# Patient Record
Sex: FEMALE | Race: WHITE | NOT HISPANIC OR LATINO | Employment: FULL TIME | URBAN - METROPOLITAN AREA
[De-identification: names, ages, dates, MRNs, and addresses within clinical notes are randomized per-mention and may not be internally consistent; named-entity substitution may affect disease eponyms.]

---

## 2023-05-04 ENCOUNTER — HOSPITAL ENCOUNTER (OUTPATIENT)
Dept: DATA CONVERSION | Facility: HOSPITAL | Age: 53
End: 2023-05-04
Attending: PAIN MEDICINE | Admitting: PAIN MEDICINE
Payer: COMMERCIAL

## 2023-05-04 DIAGNOSIS — M96.1 POSTLAMINECTOMY SYNDROME, NOT ELSEWHERE CLASSIFIED: ICD-10-CM

## 2023-05-15 DIAGNOSIS — F41.9 ANXIETY: ICD-10-CM

## 2023-05-15 DIAGNOSIS — I10 HYPERTENSION, UNSPECIFIED TYPE: Primary | ICD-10-CM

## 2023-05-15 RX ORDER — DULOXETIN HYDROCHLORIDE 60 MG/1
CAPSULE, DELAYED RELEASE ORAL
Qty: 180 CAPSULE | Refills: 3 | Status: SHIPPED | OUTPATIENT
Start: 2023-05-15

## 2023-05-15 RX ORDER — BUPROPION HYDROCHLORIDE 300 MG/1
TABLET ORAL
Qty: 90 TABLET | Refills: 3 | Status: SHIPPED | OUTPATIENT
Start: 2023-05-15

## 2023-05-15 RX ORDER — METOPROLOL TARTRATE 25 MG/1
TABLET, FILM COATED ORAL
Qty: 90 TABLET | Refills: 3 | Status: SHIPPED | OUTPATIENT
Start: 2023-05-15 | End: 2023-07-07 | Stop reason: SDUPTHER

## 2023-06-05 ENCOUNTER — HOSPITAL ENCOUNTER (OUTPATIENT)
Dept: DATA CONVERSION | Facility: HOSPITAL | Age: 53
End: 2023-06-05
Attending: PAIN MEDICINE | Admitting: PAIN MEDICINE
Payer: COMMERCIAL

## 2023-06-05 DIAGNOSIS — M70.60 TROCHANTERIC BURSITIS, UNSPECIFIED HIP: ICD-10-CM

## 2023-06-05 DIAGNOSIS — M96.1 POSTLAMINECTOMY SYNDROME, NOT ELSEWHERE CLASSIFIED: ICD-10-CM

## 2023-06-09 ENCOUNTER — OFFICE VISIT (OUTPATIENT)
Dept: PRIMARY CARE | Facility: CLINIC | Age: 53
End: 2023-06-09
Payer: COMMERCIAL

## 2023-06-09 VITALS
WEIGHT: 216 LBS | DIASTOLIC BLOOD PRESSURE: 100 MMHG | TEMPERATURE: 97.9 F | BODY MASS INDEX: 37.08 KG/M2 | SYSTOLIC BLOOD PRESSURE: 152 MMHG

## 2023-06-09 DIAGNOSIS — K21.9 GASTROESOPHAGEAL REFLUX DISEASE, UNSPECIFIED WHETHER ESOPHAGITIS PRESENT: Primary | ICD-10-CM

## 2023-06-09 DIAGNOSIS — E66.1 CLASS 2 DRUG-INDUCED OBESITY WITH SERIOUS COMORBIDITY AND BODY MASS INDEX (BMI) OF 37.0 TO 37.9 IN ADULT: ICD-10-CM

## 2023-06-09 PROCEDURE — 3008F BODY MASS INDEX DOCD: CPT | Performed by: FAMILY MEDICINE

## 2023-06-09 PROCEDURE — 99213 OFFICE O/P EST LOW 20 MIN: CPT | Performed by: FAMILY MEDICINE

## 2023-06-09 RX ORDER — BUTYROSPERMUM PARKII(SHEA BUTTER), SIMMONDSIA CHINENSIS (JOJOBA) SEED OIL, ALOE BARBADENSIS LEAF EXTRACT .01; 1; 3.5 G/100G; G/100G; G/100G
250 LIQUID TOPICAL 2 TIMES DAILY
COMMUNITY

## 2023-06-09 RX ORDER — TIZANIDINE 4 MG/1
4 TABLET ORAL EVERY 6 HOURS PRN
COMMUNITY
Start: 2020-05-22 | End: 2024-02-07

## 2023-06-09 RX ORDER — SODIUM FLUORIDE 6 MG/ML
1 PASTE, DENTIFRICE DENTAL DAILY
COMMUNITY
Start: 2023-06-07

## 2023-06-09 RX ORDER — HYDROMORPHONE HYDROCHLORIDE 4 MG/1
1 TABLET ORAL DAILY
COMMUNITY
Start: 2023-05-25 | End: 2023-10-06 | Stop reason: SDUPTHER

## 2023-06-09 RX ORDER — HYDROXYCHLOROQUINE SULFATE 200 MG/1
1.5 TABLET, FILM COATED ORAL DAILY
COMMUNITY
Start: 2011-10-25 | End: 2023-12-11

## 2023-06-09 RX ORDER — PHENOL 1.4 %
1 AEROSOL, SPRAY (ML) MUCOUS MEMBRANE DAILY
COMMUNITY
Start: 2022-01-18

## 2023-06-09 RX ORDER — MINERAL OIL
1 ENEMA (ML) RECTAL DAILY
COMMUNITY
Start: 2008-10-16

## 2023-06-09 RX ORDER — NALOXONE HYDROCHLORIDE 4 MG/.1ML
4 SPRAY NASAL AS NEEDED
COMMUNITY
Start: 2020-02-04

## 2023-06-09 RX ORDER — OMEPRAZOLE 20 MG/1
20 CAPSULE, DELAYED RELEASE ORAL DAILY
Qty: 30 CAPSULE | Refills: 5 | Status: SHIPPED | OUTPATIENT
Start: 2023-06-09 | End: 2023-07-07 | Stop reason: SDUPTHER

## 2023-06-09 ASSESSMENT — PATIENT HEALTH QUESTIONNAIRE - PHQ9
2. FEELING DOWN, DEPRESSED OR HOPELESS: NOT AT ALL
1. LITTLE INTEREST OR PLEASURE IN DOING THINGS: NOT AT ALL
SUM OF ALL RESPONSES TO PHQ9 QUESTIONS 1 AND 2: 0

## 2023-06-09 ASSESSMENT — ENCOUNTER SYMPTOMS: DEPRESSION: 0

## 2023-06-09 NOTE — PROGRESS NOTES
"Subjective   Patient ID: [id number]     Samina Celeste is a 52 y.o. female who presents for GERD (Referred by dentist./Would like to discuss weight loss medication.).  HPI  She was referred by her dentist for possible GERD.  She feels occasional heartburn but usually no discomfort.  No cough.  No hoarseness or laryngitis.      There is \"obvious\" signs of reflux, according to her dentist.    She would like to discuss weight loss medication.    Objective     BP (!) 152/100 (BP Location: Right arm, Patient Position: Sitting)   Temp 36.6 °C (97.9 °F)   Wt 98 kg (216 lb)   BMI 37.08 kg/m²      Physical Exam  Constitutional:       Appearance: Normal appearance.   Eyes:      Comments: Teeth discolored, mild brownish.  Per dentist signs of reflux.   Neurological:      Mental Status: She is alert.         Assessment/Plan   Problem List Items Addressed This Visit    None  Visit Diagnoses       Gastroesophageal reflux disease, unspecified whether esophagitis present    -  Primary    Relevant Medications    omeprazole (PriLOSEC) 20 mg DR capsule    Class 2 drug-induced obesity with serious comorbidity and body mass index (BMI) of 37.0 to 37.9 in adult        Relevant Medications    semaglutide 0.25 mg or 0.5 mg (2 mg/3 mL) pen injector        I prescribed omeprazole for suspected reflux.  I started you on Ozempic.  Return in one month for a recheck.  Your blood pressure is high.  We will recheck the blood pressure.  Try to decrease carbs and increase exercise.      Noe Velazquez, DO   "

## 2023-06-09 NOTE — PATIENT INSTRUCTIONS
I prescribed omeprazole for suspected reflux.  I started you on Ozempic.  Return in one month for a recheck.  Your blood pressure is high.  We will recheck the blood pressure.  Try to decrease carbs and increase exercise.

## 2023-06-16 ENCOUNTER — TELEPHONE (OUTPATIENT)
Dept: PRIMARY CARE | Facility: CLINIC | Age: 53
End: 2023-06-16
Payer: COMMERCIAL

## 2023-07-07 ENCOUNTER — OFFICE VISIT (OUTPATIENT)
Dept: PRIMARY CARE | Facility: CLINIC | Age: 53
End: 2023-07-07
Payer: COMMERCIAL

## 2023-07-07 VITALS
SYSTOLIC BLOOD PRESSURE: 132 MMHG | BODY MASS INDEX: 37.08 KG/M2 | DIASTOLIC BLOOD PRESSURE: 98 MMHG | TEMPERATURE: 97.5 F | WEIGHT: 216 LBS

## 2023-07-07 DIAGNOSIS — K21.9 GASTROESOPHAGEAL REFLUX DISEASE, UNSPECIFIED WHETHER ESOPHAGITIS PRESENT: ICD-10-CM

## 2023-07-07 DIAGNOSIS — E66.1 CLASS 2 DRUG-INDUCED OBESITY WITH SERIOUS COMORBIDITY AND BODY MASS INDEX (BMI) OF 37.0 TO 37.9 IN ADULT: ICD-10-CM

## 2023-07-07 DIAGNOSIS — I10 HYPERTENSION, UNSPECIFIED TYPE: Primary | ICD-10-CM

## 2023-07-07 PROCEDURE — 1036F TOBACCO NON-USER: CPT | Performed by: FAMILY MEDICINE

## 2023-07-07 PROCEDURE — 99214 OFFICE O/P EST MOD 30 MIN: CPT | Performed by: FAMILY MEDICINE

## 2023-07-07 PROCEDURE — 3008F BODY MASS INDEX DOCD: CPT | Performed by: FAMILY MEDICINE

## 2023-07-07 PROCEDURE — 3080F DIAST BP >= 90 MM HG: CPT | Performed by: FAMILY MEDICINE

## 2023-07-07 PROCEDURE — 3075F SYST BP GE 130 - 139MM HG: CPT | Performed by: FAMILY MEDICINE

## 2023-07-07 RX ORDER — METOPROLOL TARTRATE 25 MG/1
25 TABLET, FILM COATED ORAL 2 TIMES DAILY
Qty: 180 TABLET | Refills: 1 | Status: SHIPPED | OUTPATIENT
Start: 2023-07-07 | End: 2023-08-07 | Stop reason: SDUPTHER

## 2023-07-07 RX ORDER — SEMAGLUTIDE 0.25 MG/.5ML
0.25 INJECTION, SOLUTION SUBCUTANEOUS
Qty: 2 ML | Refills: 1 | Status: SHIPPED | OUTPATIENT
Start: 2023-07-07 | End: 2023-12-19 | Stop reason: WASHOUT

## 2023-07-07 RX ORDER — OMEPRAZOLE 20 MG/1
20 CAPSULE, DELAYED RELEASE ORAL DAILY
Qty: 90 CAPSULE | Refills: 1 | Status: SHIPPED | OUTPATIENT
Start: 2023-07-07 | End: 2023-12-26

## 2023-07-07 NOTE — PROGRESS NOTES
Subjective   Patient ID: [id number]     Samina Celeste is a 52 y.o. female who presents for Follow-up.  HPI  She is here for a one month recheck.  She was started on omeprazole for reflux that was seen by her dentist. She does not feel anything new.  She has not seen her dentist since.  Never felt reflux to beginning.  She has had less heartburn since starting omeprazole.  She has not noticed any side effects to omeprazole.       Her blood pressure was high last month and remains high now at 132/98.      She was started on ozempic for weight loss last month.  Her weight is unchanged at 216 pounds.  She was not able to get the Ozempic. It was denied by insurance.  She is asking about Weygovy.      Objective     BP (!) 132/98 (BP Location: Right arm, Patient Position: Sitting)   Temp 36.4 °C (97.5 °F)   Wt 98 kg (216 lb)   BMI 37.08 kg/m²      Physical Exam  Constitutional:       Appearance: Normal appearance.   Cardiovascular:      Rate and Rhythm: Normal rate and regular rhythm.   Pulmonary:      Effort: Pulmonary effort is normal.      Breath sounds: Normal breath sounds.   Abdominal:      General: Abdomen is flat.      Palpations: Abdomen is soft.   Neurological:      Mental Status: She is alert.         Assessment/Plan   Problem List Items Addressed This Visit    None  Visit Diagnoses       Hypertension, unspecified type    -  Primary    Relevant Medications    metoprolol tartrate (Lopressor) 25 mg tablet    Gastroesophageal reflux disease, unspecified whether esophagitis present        Relevant Medications    omeprazole (PriLOSEC) 20 mg DR capsule    Class 2 drug-induced obesity with serious comorbidity and body mass index (BMI) of 37.0 to 37.9 in adult        Relevant Medications    semaglutide, weight loss, (Wegovy) 0.25 mg/0.5 mL pen injector        Your blood pressure remains high.  I will increase your metoprolol.  Try to lose weight. Avoid sodium in the diet by basing your diet mostly on fresh foods,  especially fruits, vegetables and lean proteins.  Drink only water.  Your omeprazole was refilled.  Follow up with your dentist as directed.  I ordered Weygovy to try to help you with weight loss.  Return in one or two months for a recheck.     Noe Velazquez, DO

## 2023-07-07 NOTE — PATIENT INSTRUCTIONS
Your blood pressure remains high.  I will increase your metoprolol.  Try to lose weight. Avoid sodium in the diet by basing your diet mostly on fresh foods, especially fruits, vegetables and lean proteins.  Drink only water.  Your omeprazole was refilled.  Follow up with your dentist as directed.  I ordered Ascencion to try to help you with weight loss.  Return in one or two months for a recheck.

## 2023-07-21 ENCOUNTER — HOSPITAL ENCOUNTER (OUTPATIENT)
Dept: DATA CONVERSION | Facility: HOSPITAL | Age: 53
End: 2023-07-21
Attending: PAIN MEDICINE | Admitting: PAIN MEDICINE
Payer: COMMERCIAL

## 2023-07-21 DIAGNOSIS — Z45.1 ENCOUNTER FOR ADJUSTMENT AND MANAGEMENT OF INFUSION PUMP: ICD-10-CM

## 2023-07-21 DIAGNOSIS — M96.1 POSTLAMINECTOMY SYNDROME, NOT ELSEWHERE CLASSIFIED: ICD-10-CM

## 2023-07-28 ENCOUNTER — HOSPITAL ENCOUNTER (OUTPATIENT)
Dept: DATA CONVERSION | Facility: HOSPITAL | Age: 53
End: 2023-07-28
Attending: PAIN MEDICINE | Admitting: PAIN MEDICINE
Payer: COMMERCIAL

## 2023-07-28 DIAGNOSIS — M70.62 TROCHANTERIC BURSITIS, LEFT HIP: ICD-10-CM

## 2023-07-28 DIAGNOSIS — M70.61 TROCHANTERIC BURSITIS, RIGHT HIP: ICD-10-CM

## 2023-08-07 ENCOUNTER — OFFICE VISIT (OUTPATIENT)
Dept: PRIMARY CARE | Facility: CLINIC | Age: 53
End: 2023-08-07
Payer: COMMERCIAL

## 2023-08-07 VITALS
WEIGHT: 217 LBS | TEMPERATURE: 97.9 F | SYSTOLIC BLOOD PRESSURE: 158 MMHG | BODY MASS INDEX: 37.25 KG/M2 | DIASTOLIC BLOOD PRESSURE: 94 MMHG

## 2023-08-07 DIAGNOSIS — E66.1 CLASS 2 DRUG-INDUCED OBESITY WITH SERIOUS COMORBIDITY AND BODY MASS INDEX (BMI) OF 37.0 TO 37.9 IN ADULT: ICD-10-CM

## 2023-08-07 DIAGNOSIS — I10 HYPERTENSION, UNSPECIFIED TYPE: Primary | ICD-10-CM

## 2023-08-07 DIAGNOSIS — K21.9 GASTROESOPHAGEAL REFLUX DISEASE, UNSPECIFIED WHETHER ESOPHAGITIS PRESENT: ICD-10-CM

## 2023-08-07 PROCEDURE — 3080F DIAST BP >= 90 MM HG: CPT | Performed by: FAMILY MEDICINE

## 2023-08-07 PROCEDURE — 3077F SYST BP >= 140 MM HG: CPT | Performed by: FAMILY MEDICINE

## 2023-08-07 PROCEDURE — 99213 OFFICE O/P EST LOW 20 MIN: CPT | Performed by: FAMILY MEDICINE

## 2023-08-07 PROCEDURE — 1036F TOBACCO NON-USER: CPT | Performed by: FAMILY MEDICINE

## 2023-08-07 PROCEDURE — 3008F BODY MASS INDEX DOCD: CPT | Performed by: FAMILY MEDICINE

## 2023-08-07 RX ORDER — TIRZEPATIDE 2.5 MG/.5ML
2.5 INJECTION, SOLUTION SUBCUTANEOUS
Qty: 2 ML | Refills: 0 | Status: SHIPPED | OUTPATIENT
Start: 2023-08-07 | End: 2023-12-19 | Stop reason: WASHOUT

## 2023-08-07 RX ORDER — METOPROLOL TARTRATE 50 MG/1
50 TABLET ORAL 2 TIMES DAILY
Qty: 60 TABLET | Refills: 0 | Status: SHIPPED | OUTPATIENT
Start: 2023-08-07 | End: 2023-11-05

## 2023-08-07 NOTE — PATIENT INSTRUCTIONS
I will prescribe Mounjaro to try to help you with weight loss.  Restrict portions and avoid carbs. Increase exercise.      I will order a higher dose of metoprolol at 50 mg twice a day.      Continue the omeprazole for heartburn and suspected reflux.    Return in one month for a recheck and sooner if there are any problems.

## 2023-08-07 NOTE — PROGRESS NOTES
Subjective   Patient ID: [id number]     Samina Celeste is a 52 y.o. female who presents for Follow-up (One Month).  HPI  She is here for a one month recheck.      She was started on omeprazole last month due to her dentist's concerns for GERD based upon the appearance of her teeth.      She was prescribed Wegovy last month.  Her weight is up one pound.  BMI is 37.25.      Her blood pressure is 158/94 and has been elevated over the last three visits.      She denies any reflux.  She was never aware of the reflux but states her heartburn has gone away.  Denies any side effects to omeprazole.      She was not able to start Wegovy.  It was denied by insurance.    Her blood pressure remains high.  She is on metoprolol 25 mg bid.            Objective     BP (!) 158/94 (BP Location: Right arm, Patient Position: Sitting)   Temp 36.6 °C (97.9 °F)   Wt 98.4 kg (217 lb)   BMI 37.25 kg/m²      Physical Exam  Constitutional:       Appearance: Normal appearance.   Cardiovascular:      Rate and Rhythm: Normal rate and regular rhythm.      Heart sounds: Normal heart sounds.   Pulmonary:      Effort: Pulmonary effort is normal.      Breath sounds: Normal breath sounds.   Neurological:      Mental Status: She is alert.         Assessment/Plan   Problem List Items Addressed This Visit    None  Visit Diagnoses       Hypertension, unspecified type    -  Primary    Relevant Medications    metoprolol tartrate (Lopressor) 50 mg tablet    Gastroesophageal reflux disease, unspecified whether esophagitis present        Class 2 drug-induced obesity with serious comorbidity and body mass index (BMI) of 37.0 to 37.9 in adult        Relevant Medications    tirzepatide (Mounjaro) 2.5 mg/0.5 mL pen injector        I will prescribe Mounjaro to try to help you with weight loss.  Restrict portions and avoid carbs. Increase exercise.      I will order a higher dose of metoprolol at 50 mg twice a day.      Continue the omeprazole for heartburn and  suspected reflux.    Return in one month for a recheck and sooner if there are any problems.    Noe Velazquez, DO

## 2023-09-11 ENCOUNTER — APPOINTMENT (OUTPATIENT)
Dept: PRIMARY CARE | Facility: CLINIC | Age: 53
End: 2023-09-11
Payer: COMMERCIAL

## 2023-09-14 PROBLEM — Z79.899 DRUG THERAPY: Status: ACTIVE | Noted: 2023-09-14

## 2023-09-14 PROBLEM — R91.1 LUNG NODULE: Status: ACTIVE | Noted: 2023-09-14

## 2023-09-14 PROBLEM — F45.42 PAIN DISORDER ASSOCIATED WITH PSYCHOLOGICAL AND PHYSICAL FACTORS: Status: ACTIVE | Noted: 2023-09-14

## 2023-09-14 PROBLEM — H35.40 PERIPHERAL RETINAL DEGENERATION OF BOTH EYES: Status: ACTIVE | Noted: 2023-02-28

## 2023-09-14 PROBLEM — S69.92XA INJURY OF LEFT THUMB: Status: ACTIVE | Noted: 2023-09-14

## 2023-09-14 PROBLEM — N93.9 ABNORMAL VAGINAL BLEEDING: Status: ACTIVE | Noted: 2023-09-14

## 2023-09-14 PROBLEM — M79.673 PAIN OF FOOT: Status: ACTIVE | Noted: 2023-09-14

## 2023-09-14 PROBLEM — F45.41 PSYCHOGENIC PAIN: Status: ACTIVE | Noted: 2023-09-14

## 2023-09-14 PROBLEM — S96.912A STRAIN OF LEFT FOOT: Status: ACTIVE | Noted: 2023-09-14

## 2023-09-14 PROBLEM — E66.1 DRUG-INDUCED OBESITY: Status: ACTIVE | Noted: 2023-09-14

## 2023-09-14 PROBLEM — M48.00 SPINAL STENOSIS: Status: ACTIVE | Noted: 2023-09-14

## 2023-09-14 PROBLEM — H25.13 AGE-RELATED NUCLEAR CATARACT OF BOTH EYES: Status: ACTIVE | Noted: 2023-02-28

## 2023-09-14 PROBLEM — M25.562 LEFT KNEE PAIN: Status: ACTIVE | Noted: 2023-09-14

## 2023-09-14 PROBLEM — M54.9 CHRONIC BACK PAIN: Status: ACTIVE | Noted: 2023-09-14

## 2023-09-14 PROBLEM — M54.50 LOW BACK PAIN, UNSPECIFIED: Status: ACTIVE | Noted: 2022-10-27

## 2023-09-14 PROBLEM — Z86.69 HISTORY OF MIGRAINE: Status: ACTIVE | Noted: 2023-09-14

## 2023-09-14 PROBLEM — H25.013 CORTICAL AGE-RELATED CATARACT OF BOTH EYES: Status: ACTIVE | Noted: 2023-02-28

## 2023-09-14 PROBLEM — E87.1 HYPONATREMIA: Status: ACTIVE | Noted: 2022-08-01

## 2023-09-14 PROBLEM — G47.00 INSOMNIA: Status: ACTIVE | Noted: 2023-09-14

## 2023-09-14 PROBLEM — M75.42 IMPINGEMENT SYNDROME OF LEFT SHOULDER: Status: ACTIVE | Noted: 2023-09-14

## 2023-09-14 PROBLEM — S61.219A LACERATION OF FINGER: Status: ACTIVE | Noted: 2023-09-14

## 2023-09-14 PROBLEM — R80.9 PROTEINURIA: Status: ACTIVE | Noted: 2023-09-14

## 2023-09-14 PROBLEM — T85.898A OBSTRUCTED PRESSURE-EQUALIZATION (PE) TUBE: Status: ACTIVE | Noted: 2019-07-23

## 2023-09-14 PROBLEM — K21.9 GASTROESOPHAGEAL REFLUX DISEASE: Status: ACTIVE | Noted: 2023-09-14

## 2023-09-14 PROBLEM — S29.011A PECTORALIS MUSCLE STRAIN: Status: ACTIVE | Noted: 2023-09-14

## 2023-09-14 PROBLEM — Z80.3 FAMILY HISTORY OF BREAST CANCER: Status: ACTIVE | Noted: 2022-09-07

## 2023-09-14 PROBLEM — M71.9 DISORDER OF BURSAE OF SHOULDER REGION: Status: ACTIVE | Noted: 2020-10-13

## 2023-09-14 PROBLEM — S86.912A STRAIN OF LEFT KNEE: Status: ACTIVE | Noted: 2023-09-14

## 2023-09-14 PROBLEM — E66.9 OBESITY: Status: ACTIVE | Noted: 2023-09-14

## 2023-09-14 PROBLEM — M76.52 PATELLAR TENDINITIS OF LEFT KNEE: Status: ACTIVE | Noted: 2023-09-14

## 2023-09-14 PROBLEM — H52.203 ASTIGMATISM OF BOTH EYES: Status: ACTIVE | Noted: 2023-02-28

## 2023-09-14 PROBLEM — D49.2 SKIN NEOPLASM: Status: ACTIVE | Noted: 2023-09-14

## 2023-09-14 PROBLEM — R06.02 SHORTNESS OF BREATH: Status: ACTIVE | Noted: 2023-09-14

## 2023-09-14 PROBLEM — S61.213A LACERATION OF LEFT MIDDLE FINGER WITHOUT FOREIGN BODY WITHOUT DAMAGE TO NAIL: Status: ACTIVE | Noted: 2023-09-14

## 2023-09-14 PROBLEM — R29.898 WEAKNESS OF RIGHT HAND: Status: ACTIVE | Noted: 2023-09-14

## 2023-09-14 PROBLEM — Z87.39 H/O DEGENERATIVE DISC DISEASE: Status: ACTIVE | Noted: 2023-09-14

## 2023-09-14 PROBLEM — S90.30XA FOOT CONTUSION: Status: ACTIVE | Noted: 2023-09-14

## 2023-09-14 PROBLEM — I10 HYPERTENSION: Status: ACTIVE | Noted: 2023-09-14

## 2023-09-14 PROBLEM — R92.8 ABNORMAL MAMMOGRAM: Status: ACTIVE | Noted: 2022-09-07

## 2023-09-14 PROBLEM — M70.62 TROCHANTERIC BURSITIS OF LEFT HIP: Status: ACTIVE | Noted: 2023-07-28

## 2023-09-14 PROBLEM — R74.8 ELEVATED LIVER ENZYMES: Status: ACTIVE | Noted: 2023-09-14

## 2023-09-14 PROBLEM — S62.525A CLOSED NONDISPLACED FRACTURE OF DISTAL PHALANX OF LEFT THUMB: Status: ACTIVE | Noted: 2023-09-14

## 2023-09-14 PROBLEM — R25.1 TREMOR: Status: ACTIVE | Noted: 2023-09-14

## 2023-09-14 PROBLEM — Z96.22 RETAINED MYRINGOTOMY TUBE IN LEFT EAR: Status: ACTIVE | Noted: 2023-09-14

## 2023-09-14 PROBLEM — J04.0 LARYNGITIS: Status: ACTIVE | Noted: 2023-09-14

## 2023-09-14 PROBLEM — M25.512 LEFT SHOULDER PAIN: Status: ACTIVE | Noted: 2023-09-14

## 2023-09-14 PROBLEM — R53.83 MALAISE AND FATIGUE: Status: ACTIVE | Noted: 2023-09-14

## 2023-09-14 PROBLEM — G43.909 MIGRAINE HEADACHE: Status: ACTIVE | Noted: 2023-09-14

## 2023-09-14 PROBLEM — R53.83 FATIGUE: Status: ACTIVE | Noted: 2023-09-14

## 2023-09-14 PROBLEM — M35.9 UNDIFFERENTIATED CONNECTIVE TISSUE DISEASE (MULTI): Status: ACTIVE | Noted: 2022-08-01

## 2023-09-14 PROBLEM — N95.1 MENOPAUSAL SYMPTOMS: Status: ACTIVE | Noted: 2023-09-14

## 2023-09-14 PROBLEM — M48.061 LUMBAR STENOSIS: Status: ACTIVE | Noted: 2022-10-27

## 2023-09-14 PROBLEM — R91.1 NODULE OF LOWER LOBE OF RIGHT LUNG: Status: ACTIVE | Noted: 2023-09-14

## 2023-09-14 PROBLEM — D72.829 LEUKOCYTOSIS: Status: ACTIVE | Noted: 2023-09-14

## 2023-09-14 PROBLEM — R20.0 NUMBNESS OF UPPER EXTREMITY: Status: ACTIVE | Noted: 2023-09-14

## 2023-09-14 PROBLEM — G89.29 CHRONIC BACK PAIN: Status: ACTIVE | Noted: 2023-09-14

## 2023-09-14 PROBLEM — M79.605 PAIN OF LEFT LOWER EXTREMITY: Status: ACTIVE | Noted: 2023-09-14

## 2023-09-14 PROBLEM — M62.838 MUSCLE SPASM: Status: ACTIVE | Noted: 2023-09-14

## 2023-09-14 PROBLEM — M75.22 BICEPS TENDONITIS ON LEFT: Status: ACTIVE | Noted: 2023-09-14

## 2023-09-14 PROBLEM — H52.4 PRESBYOPIA: Status: ACTIVE | Noted: 2023-02-28

## 2023-09-14 PROBLEM — H52.13 MYOPIA, BILATERAL: Status: ACTIVE | Noted: 2023-02-28

## 2023-09-14 PROBLEM — R53.81 MALAISE AND FATIGUE: Status: ACTIVE | Noted: 2023-09-14

## 2023-09-14 PROBLEM — M50.90 CERVICAL DISC DISORDER: Status: ACTIVE | Noted: 2020-10-13

## 2023-09-14 PROBLEM — R91.8 LUNG MASS: Status: ACTIVE | Noted: 2023-09-14

## 2023-09-14 PROBLEM — M79.605 LEFT LEG PAIN: Status: ACTIVE | Noted: 2023-09-14

## 2023-09-14 PROBLEM — M47.816 LUMBAR SPONDYLOSIS: Status: ACTIVE | Noted: 2023-09-14

## 2023-09-14 PROBLEM — S69.90XA HAND INJURY: Status: ACTIVE | Noted: 2023-09-14

## 2023-09-14 PROBLEM — D49.2 GENERALIZED SKIN TUMORS: Status: ACTIVE | Noted: 2023-09-14

## 2023-09-14 PROBLEM — M70.61 TROCHANTERIC BURSITIS OF RIGHT HIP: Status: ACTIVE | Noted: 2023-07-28

## 2023-09-14 PROBLEM — R10.2 PELVIC PAIN IN FEMALE: Status: ACTIVE | Noted: 2023-09-14

## 2023-09-14 PROBLEM — F41.9 ANXIETY: Status: ACTIVE | Noted: 2023-09-14

## 2023-09-14 PROBLEM — M75.20 BICEPS TENDINITIS: Status: ACTIVE | Noted: 2023-09-14

## 2023-09-14 PROBLEM — D64.9 ANEMIA: Status: ACTIVE | Noted: 2023-09-14

## 2023-09-14 PROBLEM — M96.1 LUMBAR POST-LAMINECTOMY SYNDROME: Status: ACTIVE | Noted: 2023-09-14

## 2023-09-14 PROBLEM — H00.031 CELLULITIS OF RIGHT UPPER EYELID: Status: ACTIVE | Noted: 2023-09-14

## 2023-09-14 PROBLEM — H43.812 POSTERIOR VITREOUS DETACHMENT OF LEFT EYE: Status: ACTIVE | Noted: 2023-02-28

## 2023-09-14 PROBLEM — F32.A DEPRESSION: Status: ACTIVE | Noted: 2023-09-14

## 2023-09-27 LAB
ANION GAP IN SER/PLAS: 14 MMOL/L (ref 10–20)
BASOPHILS (10*3/UL) IN BLOOD BY AUTOMATED COUNT: 0.04 X10E9/L (ref 0–0.1)
BASOPHILS/100 LEUKOCYTES IN BLOOD BY AUTOMATED COUNT: 0.5 % (ref 0–2)
CALCIUM (MG/DL) IN SER/PLAS: 9.6 MG/DL (ref 8.6–10.3)
CARBON DIOXIDE, TOTAL (MMOL/L) IN SER/PLAS: 27 MMOL/L (ref 21–32)
CHLORIDE (MMOL/L) IN SER/PLAS: 92 MMOL/L (ref 98–107)
CHOLESTEROL (MG/DL) IN SER/PLAS: 141 MG/DL (ref 0–199)
CHOLESTEROL IN HDL (MG/DL) IN SER/PLAS: 58.1 MG/DL
CHOLESTEROL/HDL RATIO: 2.4
COMPLEMENT C3 (MG/DL) IN SER/PLAS: 173 MG/DL (ref 87–200)
COMPLEMENT C4 (MG/DL) IN SER/PLAS: 33 MG/DL (ref 10–50)
CREATININE (MG/DL) IN SER/PLAS: 0.94 MG/DL (ref 0.5–1.05)
CREATININE (MG/DL) IN URINE: 95 MG/DL (ref 20–320)
EOSINOPHILS (10*3/UL) IN BLOOD BY AUTOMATED COUNT: 0.11 X10E9/L (ref 0–0.7)
EOSINOPHILS/100 LEUKOCYTES IN BLOOD BY AUTOMATED COUNT: 1.3 % (ref 0–6)
ERYTHROCYTE DISTRIBUTION WIDTH (RATIO) BY AUTOMATED COUNT: 12.2 % (ref 11.5–14.5)
ERYTHROCYTE MEAN CORPUSCULAR HEMOGLOBIN CONCENTRATION (G/DL) BY AUTOMATED: 32.2 G/DL (ref 32–36)
ERYTHROCYTE MEAN CORPUSCULAR VOLUME (FL) BY AUTOMATED COUNT: 94 FL (ref 80–100)
ERYTHROCYTES (10*6/UL) IN BLOOD BY AUTOMATED COUNT: 3.95 X10E12/L (ref 4–5.2)
GFR FEMALE: 72 ML/MIN/1.73M2
GLUCOSE (MG/DL) IN SER/PLAS: 77 MG/DL (ref 74–99)
HEMATOCRIT (%) IN BLOOD BY AUTOMATED COUNT: 37.3 % (ref 36–46)
HEMOGLOBIN (G/DL) IN BLOOD: 12 G/DL (ref 12–16)
IMMATURE GRANULOCYTES/100 LEUKOCYTES IN BLOOD BY AUTOMATED COUNT: 0.2 % (ref 0–0.9)
LDL: 62 MG/DL (ref 0–99)
LEUKOCYTES (10*3/UL) IN BLOOD BY AUTOMATED COUNT: 8.7 X10E9/L (ref 4.4–11.3)
LYMPHOCYTES (10*3/UL) IN BLOOD BY AUTOMATED COUNT: 3.31 X10E9/L (ref 1.2–4.8)
LYMPHOCYTES/100 LEUKOCYTES IN BLOOD BY AUTOMATED COUNT: 38 % (ref 13–44)
MONOCYTES (10*3/UL) IN BLOOD BY AUTOMATED COUNT: 0.67 X10E9/L (ref 0.1–1)
MONOCYTES/100 LEUKOCYTES IN BLOOD BY AUTOMATED COUNT: 7.7 % (ref 2–10)
NEUTROPHILS (10*3/UL) IN BLOOD BY AUTOMATED COUNT: 4.56 X10E9/L (ref 1.2–7.7)
NEUTROPHILS/100 LEUKOCYTES IN BLOOD BY AUTOMATED COUNT: 52.3 % (ref 40–80)
PLATELETS (10*3/UL) IN BLOOD AUTOMATED COUNT: 385 X10E9/L (ref 150–450)
POTASSIUM (MMOL/L) IN SER/PLAS: 3.9 MMOL/L (ref 3.5–5.3)
PROTEIN (MG/DL) IN URINE: 6 MG/DL (ref 5–24)
PROTEIN/CREATININE (MG/MG) IN URINE: 0.06 MG/MG CREAT (ref 0–0.17)
SEDIMENTATION RATE, ERYTHROCYTE: 11 MM/H (ref 0–30)
SODIUM (MMOL/L) IN SER/PLAS: 129 MMOL/L (ref 136–145)
TRIGLYCERIDE (MG/DL) IN SER/PLAS: 107 MG/DL (ref 0–149)
UREA NITROGEN (MG/DL) IN SER/PLAS: 9 MG/DL (ref 6–23)
VLDL: 21 MG/DL (ref 0–40)

## 2023-09-28 LAB
ANTI-CENTROMERE: <0.2 AI
ANTI-CHROMATIN: <0.2 AI
ANTI-DNA (DS): <1 IU/ML
ANTI-JO-1 IGG: <0.2 AI
ANTI-NUCLEAR ANTIBODY (ANA): NEGATIVE
ANTI-RIBOSOMAL P: <0.2 AI
ANTI-RNP: <0.2 AI
ANTI-SCL-70: <0.2 AI
ANTI-SM/RNP: <0.2 AI
ANTI-SM: <0.2 AI
ANTI-SSA: <0.2 AI
ANTI-SSB: <0.2 AI

## 2023-10-01 DIAGNOSIS — E87.1 HYPONATREMIA: Primary | ICD-10-CM

## 2023-10-01 NOTE — PROGRESS NOTES
Sodium is low at 129- etiology unclear. Recommend repeat labs in 2 weeks: BMP, morning cortisol.  Dr. Alfonso

## 2023-10-01 NOTE — PROGRESS NOTES
Labs show sodium 129- etiology unclear. Other labs are unremarkable. I recommend repeat labs in 2 weeks: BMP, morning cortisol.  Dr. Alfonso

## 2023-10-02 NOTE — OP NOTE
PROCEDURE DETAILS    Preoperative Diagnosis:  Lumbar postlaminectomy syndrome, M96.1    Postoperative Diagnosis:  Lumbar postlaminectomy syndrome, M96.1    Surgeon: Jaye Xiao  Resident/Fellow/Other Assistant: None of these were associated with this case    Procedure:  1. Pump Refill M96.1 06504    Anesthesia: No anesthesiologist associated with this case  Estimated Blood Loss: 0  Findings: None         Operative Report:           Operation  Intrathecal pump refill.    Surgical Indications  The patient is here today to receive an intrathecal pump refill to assist with the pain.    Operative Report  The patient was taken to the procedure area, was placed into a supine positioning. The pump was interrogated and showed a refill volume of 4 mL.  The pump then was emptied and a new solution of morphine and clonidine preservative-free at a dose of 10 and 0.5 mg/mL, a total volume of 20 mL was injected into the pump, and the pump was reprogrammed to deliver a dose of 1.15 mg of morphine with PTM  0.3 mg maximum 4 per day.  The alarm date was set for 7/24/2023. The patient recovered for sufficient amount of time and then was discharged home in stable condition. Patient was advised to [facility]up with [facility] to [facility]ill his pump accordingly.                        Attestation:   Note Completion:  Attending Attestation I performed the procedure without a resident         Electronic Signatures:  Jaye Xiao)  (Signed 04-May-2023 13:26)   Authored: Post-Operative Note, Chart Review, Note Completion      Last Updated: 04-May-2023 13:26 by Jaye Xiao)

## 2023-10-02 NOTE — OP NOTE
PROCEDURE DETAILS    Preoperative Diagnosis:  Greater trochanteric bursitis, M70.60    Postoperative Diagnosis:  Greater trochanteric bursitis, M70.60    Surgeon: Jaye Xiao  Resident/Fellow/Other Assistant: None of these were associated with this case    Procedure:  1. bilateral greater trochanteric bursa M70.60 20610    Anesthesia: No anesthesiologist associated with this case  Estimated Blood Loss: 0  Findings: None         Operative Report:   Patient taken to the procedure area both hip area were prepped and draped sterilely in the normal fashion with the aid of a 25-gauge needle 40 mg of Depo-Medrol  diluted into 3 cc of 0.25% bupivacaine were injected per site of the greater trochanteric bursa bilaterally with negative aspiration of heme patient tolerated the procedure well recovered for sufficient amount of time and then was discharged home in stable  condition patient was advised to follow-up with the pain clinic on as-needed basis  Thank you for allowing me to participate in the care of this patient    Please note this report has been  produced using speech recognition software and may contain errors related to that system including grammar , punctuation  and spelling as well as words and phrases that may be inappropriate. If there are questions or concerns, please feel free to contact me to clarify.                        Attestation:   Note Completion:  Attending Attestation I performed the procedure without a resident         Electronic Signatures:  Jaye Xiao)  (Signed 05-Jun-2023 13:39)   Authored: Post-Operative Note, Chart Review, Note Completion      Last Updated: 05-Jun-2023 13:39 by Jaye Xiao)

## 2023-10-02 NOTE — OP NOTE
PROCEDURE DETAILS      Postoperative Diagnosis:  greater trochanteric bursitis   Surgeon: Jaye Xiao MD   Resident/Fellow/Other Assistant: None     Procedure:  bilateral greater trochanteric bursa injection  Estimated Blood Loss: 0  Findings: None         Operative Report:   Patient taken to the procedure area of both the greater she enteric bursa area were prepped and draped sterilely in the normal fashion with the aid of 25-gauge needle 40 mg of Depo-Medrol  diluted into 3 cc of 0.5% marcaine were injected around the right and then left greater trochanteric bursa with negative aspiration of heme patient tolerated the procedure well recovered for sufficient amount of time and then was discharged home in stable  condition patient was advised to follow-up with the pain clinic on as-needed basis                          Electronic Signatures:  Jaye Xiao)  (Signed 28-Jul-2023 15:23)   Authored: Post-Operative Note, Chart Review, Note Completion      Last Updated: 28-Jul-2023 15:23 by Jaye Xiao)

## 2023-10-05 DIAGNOSIS — M96.1 POSTLAMINECTOMY SYNDROME, LUMBAR: Primary | ICD-10-CM

## 2023-10-06 ENCOUNTER — TELEPHONE (OUTPATIENT)
Dept: PAIN MEDICINE | Facility: CLINIC | Age: 53
End: 2023-10-06

## 2023-10-06 ENCOUNTER — PROCEDURE VISIT (OUTPATIENT)
Dept: PAIN MEDICINE | Facility: CLINIC | Age: 53
End: 2023-10-06
Payer: COMMERCIAL

## 2023-10-06 VITALS
DIASTOLIC BLOOD PRESSURE: 71 MMHG | HEART RATE: 68 BPM | TEMPERATURE: 96.9 F | SYSTOLIC BLOOD PRESSURE: 109 MMHG | RESPIRATION RATE: 20 BRPM

## 2023-10-06 DIAGNOSIS — M96.1 LUMBAR POST-LAMINECTOMY SYNDROME: Primary | ICD-10-CM

## 2023-10-06 PROCEDURE — 62370 ANL SP INF PMP W/MDREPRG&FIL: CPT | Performed by: PAIN MEDICINE

## 2023-10-06 RX ORDER — HYDROMORPHONE HYDROCHLORIDE 4 MG/1
4 TABLET ORAL DAILY
Qty: 30 TABLET | Refills: 0 | Status: SHIPPED | OUTPATIENT
Start: 2023-11-06 | End: 2023-10-06

## 2023-10-06 RX ORDER — HYDROMORPHONE HYDROCHLORIDE 4 MG/1
4 TABLET ORAL DAILY
Qty: 30 TABLET | Refills: 0 | Status: SHIPPED | OUTPATIENT
Start: 2023-10-06 | End: 2023-10-06

## 2023-10-06 RX ORDER — HYDROMORPHONE HYDROCHLORIDE 4 MG/1
4 TABLET ORAL DAILY
Qty: 30 TABLET | Refills: 0 | Status: SHIPPED | OUTPATIENT
Start: 2023-10-06 | End: 2023-11-05

## 2023-10-06 RX ORDER — HYDROMORPHONE HYDROCHLORIDE 4 MG/1
4 TABLET ORAL DAILY
Qty: 30 TABLET | Refills: 0 | Status: SHIPPED | OUTPATIENT
Start: 2023-12-05 | End: 2023-12-19 | Stop reason: WASHOUT

## 2023-10-06 RX ORDER — HYDROMORPHONE HYDROCHLORIDE 4 MG/1
4 TABLET ORAL DAILY
Qty: 30 TABLET | Refills: 0 | Status: SHIPPED | OUTPATIENT
Start: 2023-12-06 | End: 2023-10-06

## 2023-10-06 RX ORDER — HYDROMORPHONE HYDROCHLORIDE 4 MG/1
4 TABLET ORAL DAILY
Qty: 30 TABLET | Refills: 0 | OUTPATIENT
Start: 2023-11-05 | End: 2023-12-12

## 2023-10-06 ASSESSMENT — PAIN - FUNCTIONAL ASSESSMENT: PAIN_FUNCTIONAL_ASSESSMENT: 0-10

## 2023-10-06 ASSESSMENT — PAIN SCALES - GENERAL: PAINLEVEL_OUTOF10: 7

## 2023-10-06 NOTE — H&P
Operation  Intrathecal pump refill.    Surgical Indications  The patient is here today to receive an intrathecal pump refill to assist with the pain.    Operative Report  The patient was taken to the procedure area, was placed into a supine positioning. The pump was interrogated and showed a refill volume of 4 mL. The pump then was emptied and a new solution of morphine and clonidine preservative-free at a dose of 10 and 0.5 mg/mL, a total volume of 20 mL was injected into the pump, and the pump was reprogrammed to deliver a dose of 1.151 mg of morphine with PTM 0.3 mg for injection per day.  The alarm date was set for 12/26/2023. The patient recovered for sufficient amount of time and then was discharged home in stable condition. Patient was advised to followup with the [facility] to refill his pump accordingly.

## 2023-10-17 ENCOUNTER — TELEMEDICINE (OUTPATIENT)
Dept: PRIMARY CARE | Facility: CLINIC | Age: 53
End: 2023-10-17
Payer: COMMERCIAL

## 2023-10-17 DIAGNOSIS — U07.1 COVID: Primary | ICD-10-CM

## 2023-10-17 PROCEDURE — 99213 OFFICE O/P EST LOW 20 MIN: CPT | Performed by: FAMILY MEDICINE

## 2023-10-17 NOTE — PROGRESS NOTES
Subjective   Patient ID: [id number]   Virtual or Telephone Consent    An interactive audio and video telecommunication system which permits real time communications between the patient (at the originating site) and provider (at the distant site) was utilized to provide this telehealth service.   Verbal consent was requested and obtained from Samina Celeste on this date, 10/17/23 for a telehealth visit.     Samina Celeste is a 52 y.o. female who presents for a sore throat.  She first became sick.  She did a covid test about an hour ago and it came back positive.  She has a sore throat.  She has sinus congestion.  She had some dizziness and achiness.  No coughing or SOB.      HPI    Objective     There were no vitals taken for this visit.     Physical Exam  Constitutional:       General: She is not in acute distress.     Appearance: Normal appearance. She is not toxic-appearing.   Pulmonary:      Effort: Pulmonary effort is normal. No respiratory distress.   Neurological:      Mental Status: She is alert and oriented to person, place, and time.         Assessment/Plan   Problem List Items Addressed This Visit    None  Visit Diagnoses       COVID    -  Primary    Relevant Medications    nirmatrelvir-ritonavir (PAXLOVID) 300 mg (150 mg x 2)-100 mg tablet therapy pack        I called in an antiviral medication to help with covid.  I recommend that you quarantine for five days starting with the day you first became sick, and until you are feeling significantly better and fever free for at least 24 hours.    I recommend that you go to the ER if you have significant shortness of breath.      Noe Velazquez, DO

## 2023-10-17 NOTE — PATIENT INSTRUCTIONS
I called in an antiviral medication to help with covid.  I recommend that you quarantine for five days starting with the day you first became sick, and until you are feeling significantly better and fever free for at least 24 hours.    I recommend that you go to the ER if you have significant shortness of breath.

## 2023-10-18 ENCOUNTER — APPOINTMENT (OUTPATIENT)
Dept: PAIN MEDICINE | Facility: CLINIC | Age: 53
End: 2023-10-18
Payer: COMMERCIAL

## 2023-11-03 ENCOUNTER — LAB (OUTPATIENT)
Dept: LAB | Facility: LAB | Age: 53
End: 2023-11-03
Payer: COMMERCIAL

## 2023-11-03 DIAGNOSIS — E87.1 HYPONATREMIA: ICD-10-CM

## 2023-11-03 LAB
ANION GAP SERPL CALC-SCNC: 11 MMOL/L (ref 10–20)
BUN SERPL-MCNC: 7 MG/DL (ref 6–23)
CALCIUM SERPL-MCNC: 9 MG/DL (ref 8.6–10.3)
CHLORIDE SERPL-SCNC: 95 MMOL/L (ref 98–107)
CO2 SERPL-SCNC: 27 MMOL/L (ref 21–32)
CREAT SERPL-MCNC: 0.89 MG/DL (ref 0.5–1.05)
GFR SERPL CREATININE-BSD FRML MDRD: 77 ML/MIN/1.73M*2
GLUCOSE SERPL-MCNC: 111 MG/DL (ref 74–99)
POTASSIUM SERPL-SCNC: 4.1 MMOL/L (ref 3.5–5.3)
SODIUM SERPL-SCNC: 129 MMOL/L (ref 136–145)

## 2023-11-03 PROCEDURE — 36415 COLL VENOUS BLD VENIPUNCTURE: CPT

## 2023-11-03 PROCEDURE — 80048 BASIC METABOLIC PNL TOTAL CA: CPT

## 2023-11-04 DIAGNOSIS — I10 HYPERTENSION, UNSPECIFIED TYPE: ICD-10-CM

## 2023-11-05 RX ORDER — METOPROLOL TARTRATE 50 MG/1
50 TABLET ORAL 2 TIMES DAILY
Qty: 180 TABLET | Refills: 0 | Status: SHIPPED | OUTPATIENT
Start: 2023-11-05 | End: 2024-01-15

## 2023-11-07 ENCOUNTER — LAB (OUTPATIENT)
Dept: LAB | Facility: LAB | Age: 53
End: 2023-11-07
Payer: COMMERCIAL

## 2023-11-07 DIAGNOSIS — E87.1 HYPONATREMIA: ICD-10-CM

## 2023-11-07 LAB — CORTIS AM PEAK SERPL-MSCNC: 6.4 UG/DL (ref 5–20)

## 2023-11-07 PROCEDURE — 36415 COLL VENOUS BLD VENIPUNCTURE: CPT

## 2023-11-07 PROCEDURE — 82533 TOTAL CORTISOL: CPT

## 2023-11-15 DIAGNOSIS — R79.89 LOW SERUM CORTISOL LEVEL: ICD-10-CM

## 2023-11-15 DIAGNOSIS — E87.1 HYPONATREMIA: Primary | ICD-10-CM

## 2023-11-15 RX ORDER — COSYNTROPIN 0.25 MG/ML
0.25 INJECTION, POWDER, FOR SOLUTION INTRAMUSCULAR; INTRAVENOUS ONCE
Qty: 1 ML | Refills: 0 | Status: SHIPPED | OUTPATIENT
Start: 2023-11-15 | End: 2023-11-21 | Stop reason: SDUPTHER

## 2023-11-17 ENCOUNTER — TELEPHONE (OUTPATIENT)
Dept: PRIMARY CARE | Facility: CLINIC | Age: 53
End: 2023-11-17
Payer: COMMERCIAL

## 2023-11-17 NOTE — TELEPHONE ENCOUNTER
Dr. Alfonso left message for patient yesterday regarding an injection she is to get from pharmacy patient has questions please call      [phone]

## 2023-11-21 DIAGNOSIS — R79.89 LOW SERUM CORTISOL LEVEL: ICD-10-CM

## 2023-11-21 DIAGNOSIS — E87.1 HYPONATREMIA: ICD-10-CM

## 2023-11-21 RX ORDER — COSYNTROPIN 0.25 MG/ML
0.25 INJECTION, POWDER, FOR SOLUTION INTRAMUSCULAR; INTRAVENOUS ONCE
Qty: 1 ML | Refills: 0 | Status: SHIPPED | OUTPATIENT
Start: 2023-11-21 | End: 2023-11-22 | Stop reason: SDUPTHER

## 2023-11-21 NOTE — TELEPHONE ENCOUNTER
I spoke to patient in the office today.  She needs a prescription for Cortrosyn sent to go to [facility] in [place].  They can order the medication.  She will then make the nursing visit for the Cortrosyn stimulation test.  Dr. Alfonso

## 2023-11-22 ENCOUNTER — PHARMACY VISIT (OUTPATIENT)
Dept: PHARMACY | Facility: CLINIC | Age: 53
End: 2023-11-22
Payer: COMMERCIAL

## 2023-11-22 ENCOUNTER — TELEPHONE (OUTPATIENT)
Dept: PRIMARY CARE | Facility: CLINIC | Age: 53
End: 2023-11-22
Payer: COMMERCIAL

## 2023-11-22 DIAGNOSIS — R79.89 LOW SERUM CORTISOL LEVEL: ICD-10-CM

## 2023-11-22 DIAGNOSIS — E87.1 HYPONATREMIA: ICD-10-CM

## 2023-11-22 PROCEDURE — RXMED WILLOW AMBULATORY MEDICATION CHARGE

## 2023-11-22 RX ORDER — COSYNTROPIN 0.25 MG/ML
0.25 INJECTION, POWDER, FOR SOLUTION INTRAMUSCULAR; INTRAVENOUS ONCE
Qty: 1 ML | Refills: 0 | Status: SHIPPED | OUTPATIENT
Start: 2023-11-22 | End: 2023-11-28

## 2023-11-22 NOTE — TELEPHONE ENCOUNTER
Patient called for a prescription that was sent out to her local pharmacy yesterday, cortrosyn 0.25 mg injection. Patient states that the pharmacy does not carries that medication, I called the pharmacy here in this building and they said that they need to receive the prescription order, so they can order it today and it will arrive by Friday. Please advise!

## 2023-11-28 ENCOUNTER — CLINICAL SUPPORT (OUTPATIENT)
Dept: PRIMARY CARE | Facility: CLINIC | Age: 53
End: 2023-11-28
Payer: COMMERCIAL

## 2023-11-28 ENCOUNTER — LAB (OUTPATIENT)
Dept: LAB | Facility: LAB | Age: 53
End: 2023-11-28
Payer: COMMERCIAL

## 2023-11-28 DIAGNOSIS — E87.1 HYPONATREMIA: Primary | ICD-10-CM

## 2023-11-28 DIAGNOSIS — E87.1 HYPONATREMIA: ICD-10-CM

## 2023-11-28 DIAGNOSIS — N95.1 MENOPAUSAL SYMPTOMS: Primary | ICD-10-CM

## 2023-11-28 LAB
CORTIS SERPL-MCNC: 20.9 UG/DL (ref 2.5–20)
CORTIS SERPL-MCNC: 28.8 UG/DL (ref 2.5–20)
CORTIS SERPL-MCNC: 4.4 UG/DL (ref 2.5–20)

## 2023-11-28 PROCEDURE — 36415 COLL VENOUS BLD VENIPUNCTURE: CPT

## 2023-11-28 PROCEDURE — 96372 THER/PROPH/DIAG INJ SC/IM: CPT | Performed by: INTERNAL MEDICINE

## 2023-11-28 PROCEDURE — 82533 TOTAL CORTISOL: CPT

## 2023-11-28 RX ORDER — COSYNTROPIN 0.25 MG/ML
0.25 INJECTION, POWDER, FOR SOLUTION INTRAMUSCULAR; INTRAVENOUS ONCE
Status: COMPLETED | OUTPATIENT
Start: 2023-11-28 | End: 2023-11-28

## 2023-11-28 RX ADMIN — COSYNTROPIN 0.25 MG: 0.25 INJECTION, POWDER, FOR SOLUTION INTRAMUSCULAR; INTRAVENOUS at 09:44

## 2023-11-28 NOTE — PROGRESS NOTES
She came in for a cortisol injection, she got a base blood level before injection and she will wait 30 minutes and than an hour following the injection for the 2 consecutive blood draws. Patient picked up the medication and brought it to office

## 2023-11-29 ENCOUNTER — APPOINTMENT (OUTPATIENT)
Dept: PAIN MEDICINE | Facility: CLINIC | Age: 53
End: 2023-11-29
Payer: COMMERCIAL

## 2023-12-04 ENCOUNTER — OFFICE VISIT (OUTPATIENT)
Dept: PAIN MEDICINE | Facility: CLINIC | Age: 53
End: 2023-12-04
Payer: COMMERCIAL

## 2023-12-04 VITALS — RESPIRATION RATE: 20 BRPM | SYSTOLIC BLOOD PRESSURE: 117 MMHG | DIASTOLIC BLOOD PRESSURE: 64 MMHG | HEART RATE: 67 BPM

## 2023-12-04 DIAGNOSIS — M25.552 CHRONIC HIP PAIN, BILATERAL: Primary | ICD-10-CM

## 2023-12-04 DIAGNOSIS — M54.50 CHRONIC BILATERAL LOW BACK PAIN, UNSPECIFIED WHETHER SCIATICA PRESENT: ICD-10-CM

## 2023-12-04 DIAGNOSIS — G89.29 CHRONIC HIP PAIN, BILATERAL: Primary | ICD-10-CM

## 2023-12-04 DIAGNOSIS — M25.551 CHRONIC HIP PAIN, BILATERAL: Primary | ICD-10-CM

## 2023-12-04 DIAGNOSIS — G89.29 CHRONIC BILATERAL LOW BACK PAIN, UNSPECIFIED WHETHER SCIATICA PRESENT: ICD-10-CM

## 2023-12-04 PROCEDURE — 1036F TOBACCO NON-USER: CPT | Performed by: NURSE PRACTITIONER

## 2023-12-04 PROCEDURE — 99213 OFFICE O/P EST LOW 20 MIN: CPT | Performed by: NURSE PRACTITIONER

## 2023-12-04 PROCEDURE — 3078F DIAST BP <80 MM HG: CPT | Performed by: NURSE PRACTITIONER

## 2023-12-04 PROCEDURE — 3074F SYST BP LT 130 MM HG: CPT | Performed by: NURSE PRACTITIONER

## 2023-12-04 PROCEDURE — 3008F BODY MASS INDEX DOCD: CPT | Performed by: NURSE PRACTITIONER

## 2023-12-04 PROCEDURE — 99213 OFFICE O/P EST LOW 20 MIN: CPT | Mod: ZK | Performed by: NURSE PRACTITIONER

## 2023-12-04 RX ORDER — HYDROMORPHONE HYDROCHLORIDE 4 MG/1
4 TABLET ORAL DAILY
Qty: 30 TABLET | Refills: 0 | Status: SHIPPED | OUTPATIENT
Start: 2024-01-03 | End: 2023-12-19 | Stop reason: WASHOUT

## 2023-12-04 RX ORDER — HYDROMORPHONE HYDROCHLORIDE 4 MG/1
4 TABLET ORAL DAILY
Qty: 30 TABLET | Refills: 0 | Status: SHIPPED | OUTPATIENT
Start: 2024-02-02 | End: 2024-01-03 | Stop reason: SDUPTHER

## 2023-12-04 RX ORDER — HYDROMORPHONE HYDROCHLORIDE 4 MG/1
4 TABLET ORAL DAILY
Qty: 30 TABLET | Refills: 0 | Status: SHIPPED | OUTPATIENT
Start: 2023-12-04 | End: 2023-12-19 | Stop reason: WASHOUT

## 2023-12-04 ASSESSMENT — PAIN DESCRIPTION - DESCRIPTORS: DESCRIPTORS: ACHING;SHARP;SHOOTING;SQUEEZING

## 2023-12-04 ASSESSMENT — PAIN SCALES - GENERAL
PAINLEVEL: 8
PAINLEVEL_OUTOF10: 8

## 2023-12-04 ASSESSMENT — PAIN - FUNCTIONAL ASSESSMENT: PAIN_FUNCTIONAL_ASSESSMENT: 0-10

## 2023-12-04 NOTE — PROGRESS NOTES
Subjective   Samina Celeste is a 53 y.o. female who presents for evaluation of her back pain.  The pain is located in the lumbar area and radiates to left leg.  The pt states her pain is 8/10 when she moves. At rest her pain is at a 4/10.  Past Medical History:   Diagnosis Date    Other conditions influencing health status     Visit For: Routine Eye Exam    Person consulting for explanation of examination or test findings     Visit for review of DEXA scan    Personal history of diseases of the skin and subcutaneous tissue     History of urticaria due to cold    Personal history of other diseases of the nervous system and sense organs     History of migraine headaches    Personal history of other medical treatment     History of mammogram    Presence of other specified functional implants     Spinal cord stimulator status     Past Surgical History:   Procedure Laterality Date    LUMBAR FUSION  04/08/2014    Lumbar Vertebral Fusion    OTHER SURGICAL HISTORY  08/16/2017    Neurological Surgery Laminectomy    OTHER SURGICAL HISTORY  06/11/2019    Back surgery    TONSILLECTOMY  03/26/2014    Tonsillectomy       I have reviewed the nurses notes and am aware of family/social history.     Review of Systems    Objective   Physical Exam    ASSESSMENT:     PLAN:   Discussed treatment plan with patient.   Call or return to clinic prn if these symptoms worsen or fail to improve as anticipated.     Maribel Mistry RN

## 2023-12-04 NOTE — PROGRESS NOTES
Subjective   Patient ID: Samina Celeste is a 53 y.o. female who presents for Med Refill and Back Pain (And medication refill).  HPI  53 years old, female she is here today for a follow up/refill of medication. She is known in this clinic because of chronic back pain.  She has a spinal cord stimulator, pain pump for control of pain and is maintained on Hydromorphone to help in controlling her pain.  She is able to Function with activities of daily living independently.      Review of Systems   Review of systems x 10 is negative.   No recent injury or falls reported.   No recent change in medical condition reported.   No recent weakness reported.   Still able to control bowel and bladder function.   Denies fever, cough, shortness of breath recently.   No interval change with medication/health issues reported.    Objective   Physical Exam  Awake,alert, no acute distress, appropriate.  Spine is of normal curvature.  Full ROM on all 4 extremities, sensation and motor intact, no vascular compromise.  No pedal edema, normal gait.  Skin warm, dry, intact, turgor is normal.  Denies any numbness, tingling.  /64   Pulse 67   Resp 20     Assessment/Plan     I have personally reviewed the O[facility] report for this patient. I have considered the risk of abuse, dependence, addiction and diversion.  I believe that it is clinically appropriate for this patient  to be prescribed this medication based on documented diagnosis.  Continue Hydromorphone as prescribed.  Discussed about result of xray left shoulder  Which showed moderate to severe left acromioclavicular osteoarthrosis.  Discussed about possible steroid injection or nerve block  Schedule for bilateral bursa injection for control of hip pains.  Follow up in 3 months time or as needed basis  Continue doing safe guidelines for preventing spread of Corona virus.  Explained plan to this patient, and patient verbalized understanding and agreement with the plan. If there is  questions or concerns, please feel free to contact me to clarify.    Chronic back pain associated with postlaminectomy syndrome.

## 2023-12-11 DIAGNOSIS — M35.9 UNDIFFERENTIATED CONNECTIVE TISSUE DISEASE (MULTI): Primary | ICD-10-CM

## 2023-12-11 RX ORDER — HYDROXYCHLOROQUINE SULFATE 200 MG/1
TABLET, FILM COATED ORAL DAILY
Qty: 135 TABLET | Refills: 1 | Status: SHIPPED | OUTPATIENT
Start: 2023-12-11

## 2023-12-12 ENCOUNTER — HOSPITAL ENCOUNTER (OUTPATIENT)
Dept: PAIN MEDICINE | Facility: CLINIC | Age: 53
Discharge: HOME | End: 2023-12-12
Payer: COMMERCIAL

## 2023-12-12 VITALS
HEART RATE: 79 BPM | TEMPERATURE: 97.1 F | DIASTOLIC BLOOD PRESSURE: 58 MMHG | RESPIRATION RATE: 18 BRPM | SYSTOLIC BLOOD PRESSURE: 105 MMHG | OXYGEN SATURATION: 95 %

## 2023-12-12 DIAGNOSIS — G89.29 CHRONIC HIP PAIN, BILATERAL: ICD-10-CM

## 2023-12-12 DIAGNOSIS — M25.551 CHRONIC HIP PAIN, BILATERAL: ICD-10-CM

## 2023-12-12 DIAGNOSIS — M25.552 CHRONIC HIP PAIN, BILATERAL: ICD-10-CM

## 2023-12-12 PROCEDURE — 2500000005 HC RX 250 GENERAL PHARMACY W/O HCPCS

## 2023-12-12 PROCEDURE — 2500000004 HC RX 250 GENERAL PHARMACY W/ HCPCS (ALT 636 FOR OP/ED): Performed by: PAIN MEDICINE

## 2023-12-12 PROCEDURE — 7100000010 HC PHASE TWO TIME - EACH INCREMENTAL 1 MINUTE

## 2023-12-12 PROCEDURE — 7100000009 HC PHASE TWO TIME - INITIAL BASE CHARGE

## 2023-12-12 PROCEDURE — 96372 THER/PROPH/DIAG INJ SC/IM: CPT | Performed by: PAIN MEDICINE

## 2023-12-12 PROCEDURE — 2500000005 HC RX 250 GENERAL PHARMACY W/O HCPCS: Performed by: PAIN MEDICINE

## 2023-12-12 RX ORDER — METHYLPREDNISOLONE ACETATE 40 MG/ML
80 INJECTION, SUSPENSION INTRA-ARTICULAR; INTRALESIONAL; INTRAMUSCULAR; SOFT TISSUE ONCE
Status: COMPLETED | OUTPATIENT
Start: 2023-12-12 | End: 2023-12-12

## 2023-12-12 RX ORDER — METHYLPREDNISOLONE ACETATE 40 MG/ML
80 INJECTION, SUSPENSION INTRA-ARTICULAR; INTRALESIONAL; INTRAMUSCULAR; SOFT TISSUE ONCE
Status: DISCONTINUED | OUTPATIENT
Start: 2023-12-12 | End: 2023-12-13 | Stop reason: HOSPADM

## 2023-12-12 RX ORDER — BUPIVACAINE HYDROCHLORIDE 2.5 MG/ML
5 INJECTION, SOLUTION EPIDURAL; INFILTRATION; INTRACAUDAL ONCE
Status: DISCONTINUED | OUTPATIENT
Start: 2023-12-12 | End: 2023-12-13 | Stop reason: HOSPADM

## 2023-12-12 RX ORDER — BUPIVACAINE HYDROCHLORIDE 5 MG/ML
10 INJECTION, SOLUTION PERINEURAL ONCE
Status: COMPLETED | OUTPATIENT
Start: 2023-12-12 | End: 2023-12-12

## 2023-12-12 RX ADMIN — METHYLPREDNISOLONE ACETATE 80 MG: 40 INJECTION, SUSPENSION INTRALESIONAL; INTRAMUSCULAR; INTRASYNOVIAL; SOFT TISSUE at 13:19

## 2023-12-12 RX ADMIN — BUPIVACAINE HYDROCHLORIDE 50 MG: 5 INJECTION, SOLUTION PERINEURAL at 13:19

## 2023-12-12 ASSESSMENT — PAIN SCALES - GENERAL: PAINLEVEL_OUTOF10: 9

## 2023-12-12 ASSESSMENT — PAIN - FUNCTIONAL ASSESSMENT: PAIN_FUNCTIONAL_ASSESSMENT: 0-10

## 2023-12-12 NOTE — OP NOTE
Patient taken to the procedure area placed into a lateral position the right hip was prepped and draped sterilely with the aid of 25-gauge needle 40 mg of Depo-Medrol diluted into 3 cc of 0.5% bupivacaine were injected around the greater trochenteric bursa  with negative aspiration of heme then same was done for the left side patient tolerated the procedure well recovered for sufficient amount of time and then was discharged home in stable condition patient was advised to follow-up with the pain clinic on as-needed basis.      The above clinical summary has been dictated with voice recognition software. It has not been proofread for grammatical errors, typographical mistakes, or other semantic inconsistencies.    Thank you for visiting our office today. It was our pleasure to take part in your healthcare.     Please do not hesitate to contact the pain clinic after your visit with any questions or concerns at  M-F 8-4 pm       Jaye Xiao M.D.  Medical Director , Division of Pain Medicine [facility]   of Anesthesiology and Pain Medicine  [facility] [place] [address]     Office: [phone]  Fax: [phone]       
Yes

## 2023-12-12 NOTE — DISCHARGE INSTRUCTIONS
Corticosteroid Joint Injection    Why is this procedure done?  Your doctor may give you a shot of a special medicine called a corticosteroid into your joint area, to help ease swelling and pain in the joint. Your joints are places where 2 or more bones meet. You may have an injury or swelling that limits your movement and causes you pain.  What will the results be?  The treatment may:  Lower pain  Reduce swelling in the muscles, nerves, and other tissues  Improve movement  What happens before the procedure?  Your doctor will ask you about your health history. Talk to the doctor about:  All the drugs you are taking. Be sure to include all prescription and over-the-counter (OTC) drugs, vitamins, and herbal supplements. Tell the doctor about any drug allergy. Bring a list of drugs you take with you.  If you have high blood sugar or diabetes. Your drugs may need to be changed.  If you have had another steroid injection within the past 6 weeks or if you have had more than 3 injections into the same area within the past 12 months.  Any bleeding problems. Be sure to tell your doctor if you are taking any drugs that may cause bleeding. Some of these are warfarin, rivaroxaban, apixaban, ticagrelor, clopidogrel, ketorolac, ibuprofen, naproxen, or aspirin. Certain vitamins and herbs, such as garlic and fish oil, may also add to the risk for bleeding. You may need to stop these drugs as well. Talk to your doctor about them.  If you have high blood pressure.  If you have had any allergic reactions to steroids before.  What happens during the procedure?  Your doctor may do an x-ray of your joint or use x-ray during the procedure to check where to give the drug. The doctor may also use ultrasound to check. The doctor may also use a colored dye or a contrast dye to check where to inject the drug.  The doctor will clean the skin over your joint. This helps prevent infection.  You may be given a drug to help you relax. Your doctor  may numb the site of the injection.  The drug will be injected into a space in or near the joint.  The needle will be taken out and a bandage will be placed over the injection site.  What happens after the procedure?  Staff will check on you to make sure you are doing well. They will tell you when you can go home.  What care is needed at home?  Relax on the day of the injection.  Avoid heavy activity with the joint that was injected for a few days.  Apply ice to the injection site. Place an ice pack or a bag of frozen peas wrapped in a towel over the painful part. Never put ice right on the skin. Do not leave the ice on more than 10 to 15 minutes at a time.  It may take few days before you will see the effects of the injection.  What follow-up care is needed?  Your doctor may ask you to make visits to the office to check on your progress. Be sure to keep these visits. You may also need to see a physical therapist (PT). The PT will teach you exercises to help you get back your strength and motion. Ask your doctor when you can exercise.  What problems could happen?  Bleeding or bruising  Soreness at the injection site  Infection  May elevate blood pressure  Cartilage damage  Whitening or lightening of the skin around the injection site  Nerve injury  Tendon rupture  Thinning of the skin and bone around where the injection was given  If you are diabetic, your blood sugars may increase for several days  Last Reviewed Date  2020-04-20

## 2023-12-13 ENCOUNTER — PROCEDURE VISIT (OUTPATIENT)
Dept: PAIN MEDICINE | Facility: CLINIC | Age: 53
End: 2023-12-13
Payer: COMMERCIAL

## 2023-12-13 VITALS
TEMPERATURE: 98.2 F | DIASTOLIC BLOOD PRESSURE: 65 MMHG | SYSTOLIC BLOOD PRESSURE: 137 MMHG | RESPIRATION RATE: 16 BRPM | HEART RATE: 71 BPM

## 2023-12-13 DIAGNOSIS — M96.1 POSTLAMINECTOMY SYNDROME, LUMBAR: ICD-10-CM

## 2023-12-13 PROCEDURE — 62370 ANL SP INF PMP W/MDREPRG&FIL: CPT | Performed by: PAIN MEDICINE

## 2023-12-13 ASSESSMENT — COLUMBIA-SUICIDE SEVERITY RATING SCALE - C-SSRS
2. HAVE YOU ACTUALLY HAD ANY THOUGHTS OF KILLING YOURSELF?: NO
6. HAVE YOU EVER DONE ANYTHING, STARTED TO DO ANYTHING, OR PREPARED TO DO ANYTHING TO END YOUR LIFE?: NO
1. IN THE PAST MONTH, HAVE YOU WISHED YOU WERE DEAD OR WISHED YOU COULD GO TO SLEEP AND NOT WAKE UP?: NO

## 2023-12-13 ASSESSMENT — PAIN SCALES - GENERAL: PAINLEVEL: 7

## 2023-12-13 NOTE — PROGRESS NOTES
Patient ID: Samina Celeste is a 53 y.o. female.    Procedures            Operation  Intrathecal pump refill.    Surgical Indications  The patient is here today to receive an intrathecal pump refill to assist with the pain.    Operative Report  The patient was taken to the procedure area, was placed into a supine positioning. The pump was interrogated and showed a refill volume of 7 mL. The pump then was emptied and a new solution of morphine and clonidine preservative-free at a dose of 10 then 0.5 mg/mL, a total volume of 20 mL was injected into the pump, and the pump was reprogrammed to deliver a dose of 1.151 mg of morphine with PTM 0.3 mg maximum 4 injection per day.  The alarm date was set for 3/3/2024. The patient recovered for sufficient amount of time and then was discharged home in stable condition. Patient was advised to followup with the [facility] to refill his pump accordingly.

## 2023-12-19 ENCOUNTER — OFFICE VISIT (OUTPATIENT)
Dept: RHEUMATOLOGY | Facility: CLINIC | Age: 53
End: 2023-12-19
Payer: COMMERCIAL

## 2023-12-19 VITALS
OXYGEN SATURATION: 93 % | WEIGHT: 201.8 LBS | BODY MASS INDEX: 34.45 KG/M2 | SYSTOLIC BLOOD PRESSURE: 110 MMHG | DIASTOLIC BLOOD PRESSURE: 64 MMHG | TEMPERATURE: 96.9 F | HEIGHT: 64 IN | HEART RATE: 48 BPM

## 2023-12-19 DIAGNOSIS — I10 PRIMARY HYPERTENSION: ICD-10-CM

## 2023-12-19 DIAGNOSIS — M47.816 LUMBAR SPONDYLOSIS: ICD-10-CM

## 2023-12-19 DIAGNOSIS — E87.1 HYPONATREMIA: ICD-10-CM

## 2023-12-19 DIAGNOSIS — M35.9 UNDIFFERENTIATED CONNECTIVE TISSUE DISEASE (MULTI): Primary | ICD-10-CM

## 2023-12-19 PROBLEM — B34.9 NONSPECIFIC SYNDROME SUGGESTIVE OF VIRAL ILLNESS: Status: ACTIVE | Noted: 2023-12-19

## 2023-12-19 PROBLEM — M70.70 BURSITIS OF HIP: Status: ACTIVE | Noted: 2023-12-19

## 2023-12-19 PROBLEM — H10.9 CONJUNCTIVITIS: Status: ACTIVE | Noted: 2023-09-14

## 2023-12-19 PROCEDURE — 93000 ELECTROCARDIOGRAM COMPLETE: CPT | Performed by: INTERNAL MEDICINE

## 2023-12-19 PROCEDURE — 3078F DIAST BP <80 MM HG: CPT | Performed by: INTERNAL MEDICINE

## 2023-12-19 PROCEDURE — 3074F SYST BP LT 130 MM HG: CPT | Performed by: INTERNAL MEDICINE

## 2023-12-19 PROCEDURE — 3008F BODY MASS INDEX DOCD: CPT | Performed by: INTERNAL MEDICINE

## 2023-12-19 PROCEDURE — 1036F TOBACCO NON-USER: CPT | Performed by: INTERNAL MEDICINE

## 2023-12-19 PROCEDURE — 90471 IMMUNIZATION ADMIN: CPT | Performed by: INTERNAL MEDICINE

## 2023-12-19 PROCEDURE — 90686 IIV4 VACC NO PRSV 0.5 ML IM: CPT | Performed by: INTERNAL MEDICINE

## 2023-12-19 PROCEDURE — 99214 OFFICE O/P EST MOD 30 MIN: CPT | Performed by: INTERNAL MEDICINE

## 2023-12-19 RX ORDER — OXYCODONE AND ACETAMINOPHEN 10; 325 MG/1; MG/1
1 TABLET ORAL DAILY
COMMUNITY
Start: 2023-04-22

## 2023-12-19 ASSESSMENT — PATIENT HEALTH QUESTIONNAIRE - PHQ9
1. LITTLE INTEREST OR PLEASURE IN DOING THINGS: NOT AT ALL
1. LITTLE INTEREST OR PLEASURE IN DOING THINGS: NOT AT ALL
2. FEELING DOWN, DEPRESSED OR HOPELESS: NOT AT ALL
SUM OF ALL RESPONSES TO PHQ9 QUESTIONS 1 AND 2: 0
2. FEELING DOWN, DEPRESSED OR HOPELESS: NOT AT ALL
SUM OF ALL RESPONSES TO PHQ9 QUESTIONS 1 AND 2: 0

## 2023-12-19 ASSESSMENT — ENCOUNTER SYMPTOMS
OCCASIONAL FEELINGS OF UNSTEADINESS: 0
LOSS OF SENSATION IN FEET: 0
DEPRESSION: 0

## 2023-12-19 NOTE — PATIENT INSTRUCTIONS
EKG today is normal.  Flu shot was given.  Check labs April 2024: CBC with differential, BMP, ESR, spot urine protein to creatinine ratio.  You have a tender point near your left shoulder blade, which I think can be injected by Dr. Xiao.   Follow-up in 4 months.

## 2023-12-19 NOTE — PROGRESS NOTES
Subjective   Patient ID: Samina Celeste is a 53 y.o. female who presents for Follow-up.    HPI 52 yo F here for f/u re: undifferentiated connective tissue disease (diagnosed 2004 with symptoms of severe fatigue after sun exposure, arthralgias of wrist and ankles, FAMILIA 1:80 with negative FAMILIA panel).   FAMILIA has been negative since 2017.     HCQ was reduced to 200 mg daily 6/21, without any worsening of symptoms.     She continues to deny peripheral joint pain. She denies rashes, dry eyes , dry mouth, or hair loss.    She does c/o pain on top of left shoulder that she ha shad for 3 months.  X-ray of left shoulder done September 2023 shows  Advanced osteoarthritis of the left acromioclavicular joint.  Glenohumeral joint itself looks unremarkable.  She has no exacerbation of pain with lifting her arm overhead, or flexion of her arm across her chest.  \  She had COVID October 2023.  She was treated with Paxlovid.  She has not fully recovered.    She also the history of lumbar spondylosis, for which she has been working with Dr. Dee and Dr. Xiao.  She had L4-L5 laminectomy and discectomy with pedicle screw fixation several years ago for spondylolisthesis with herniated disc. She had an L3- L4 lumbar laminectomy 1/17 for recurrent back and leg pain. She also has spinal cord stimulator. She also takes Percocet 7.5-325 mg once daily as needed. , as well as duloxetine 60 mg 2x daily.   She also takes Dilaudid 4 mg daily .  She last worked 1/17. She is now on Social Security disability.     She also had a spinal cord stimulator placed October 29, 2020 by Dr. Xiao, for postlaminectomy syndrome.  She notes that this has helped.     Last eye exam was 2/23(Dr. Mascorro).      She received the Pfizer COVID-19 vaccine April 20, 2021 and May 11, 2021.     Labs 12/22: CBC normal except hemoglobin 11.0 hematocrit 35.4, BMP normal except sodium 135, spot urine protein to creatinine ratio 0.07  Labs September 2023: CBC normal, BMP  "normal except sodium 129, cholesterol 141, HDL 58, LDL 62, triglycerides 107, FAMILIA negative, FAMILIA panel negative, ESR 11, C3 and C4 normal, spot urine protein to creatinine ratio 0.06  Labs November 2023: BMP normal except sodium 129  Labs November 28, 2023: Cortrosyn stimulation test was normal with baseline cortisol level 4.4, 30-minute cortisol level of 20.9, 60-minute cortisol level 28.8.     EKG December 19, 2023: Normal sinus rhythm, QTc 428 milliseconds       ROS:  General: Denies fevers or chills.  CV: Denies chest pain or palpitations.  Denies leg edema.  Lungs: Denies coughing or shortness of breath.  Skin: Denies rashes or nodules.  MS: Denies joint pain or joint swelling.     Objective   /64 (BP Location: Right arm, Patient Position: Sitting, BP Cuff Size: Large adult)   Pulse (!) 48   Temp 36.1 °C (96.9 °F)   Ht 1.626 m (5' 4\")   Wt 91.5 kg (201 lb 12.8 oz)   SpO2 93%   BMI 34.64 kg/m²     Physical Exam  HEENT: PERRL, EOMI  Neck: Supple, no nodes.  CV: RRR, no MGR.  Lungs: Clear, no rales or wheezes.  Abdomen: Soft, nontender. No hepatosplenomegaly.  Extremities:  No cyanosis, clubbing, or edema.  MS: No synovitis.  Full range of motion left shoulder without pain.  Soft tissue tender point noted at superior medial border of left scapula.  Skin: No rashes or nodules.    Assessment/Plan   Problem List Items Addressed This Visit             ICD-10-CM    Hypertension I10    Relevant Orders    ECG 12 lead (Clinic Performed) (Completed)    Lumbar spondylosis M47.816    Undifferentiated connective tissue disease (CMS/HCC) - Primary M35.9    Relevant Orders    CBC and Auto Differential    Basic Metabolic Panel    Sedimentation Rate    Protein, Urine Random    Creatinine, urine, random         1. Undifferentiated connective tissue disease- Stable.      2. Lumbar spondylosis- continue follow-up with Dr. Xiao. She is now on Social Security disability.  She is currently on Percocet  once daily as " needed, hydromorphone 4 mg daily as needed.     3. BMI 34.6- improved. will continue to work on weight loss.    4.  Hyponatremia-sodium 129.  Cortrosyn stimulation test 12/23 was normal.     Plan:  EKG today is normal.  Flu shot was given.  Check labs April 2024: CBC with differential, BMP, ESR, spot urine protein to creatinine ratio.  You have a tender point near your left shoulder blade, which I think can be injected by Dr. Xiao.   Follow-up in 4 months.

## 2023-12-26 DIAGNOSIS — K21.9 GASTROESOPHAGEAL REFLUX DISEASE, UNSPECIFIED WHETHER ESOPHAGITIS PRESENT: ICD-10-CM

## 2023-12-26 RX ORDER — OMEPRAZOLE 20 MG/1
20 CAPSULE, DELAYED RELEASE ORAL DAILY
Qty: 90 CAPSULE | Refills: 3 | Status: SHIPPED | OUTPATIENT
Start: 2023-12-26

## 2024-01-03 DIAGNOSIS — G89.29 CHRONIC BILATERAL LOW BACK PAIN, UNSPECIFIED WHETHER SCIATICA PRESENT: ICD-10-CM

## 2024-01-03 DIAGNOSIS — M35.9 UNDIFFERENTIATED CONNECTIVE TISSUE DISEASE (MULTI): ICD-10-CM

## 2024-01-03 DIAGNOSIS — M54.50 CHRONIC BILATERAL LOW BACK PAIN, UNSPECIFIED WHETHER SCIATICA PRESENT: ICD-10-CM

## 2024-01-03 RX ORDER — HYDROMORPHONE HYDROCHLORIDE 4 MG/1
4 TABLET ORAL DAILY
Qty: 30 TABLET | Refills: 0 | Status: SHIPPED | OUTPATIENT
Start: 2024-02-02 | End: 2024-01-03 | Stop reason: SDUPTHER

## 2024-01-03 RX ORDER — HYDROMORPHONE HYDROCHLORIDE 4 MG/1
4 TABLET ORAL DAILY
Qty: 30 TABLET | Refills: 0 | Status: SHIPPED | OUTPATIENT
Start: 2024-01-03 | End: 2024-02-02

## 2024-01-03 RX ORDER — HYDROMORPHONE HYDROCHLORIDE 4 MG/1
4 TABLET ORAL DAILY
Qty: 30 TABLET | Refills: 0 | Status: SHIPPED | OUTPATIENT
Start: 2024-02-02 | End: 2024-03-03

## 2024-01-15 DIAGNOSIS — I10 HYPERTENSION, UNSPECIFIED TYPE: ICD-10-CM

## 2024-01-15 RX ORDER — METOPROLOL TARTRATE 50 MG/1
50 TABLET ORAL 2 TIMES DAILY
Qty: 180 TABLET | Refills: 3 | Status: SHIPPED | OUTPATIENT
Start: 2024-01-15

## 2024-01-31 ENCOUNTER — OFFICE VISIT (OUTPATIENT)
Dept: PRIMARY CARE | Facility: CLINIC | Age: 54
End: 2024-01-31
Payer: COMMERCIAL

## 2024-01-31 VITALS
TEMPERATURE: 97.1 F | WEIGHT: 202.82 LBS | DIASTOLIC BLOOD PRESSURE: 80 MMHG | BODY MASS INDEX: 34.81 KG/M2 | SYSTOLIC BLOOD PRESSURE: 120 MMHG

## 2024-01-31 DIAGNOSIS — H01.004 BLEPHARITIS OF LEFT UPPER EYELID, UNSPECIFIED TYPE: Primary | ICD-10-CM

## 2024-01-31 PROCEDURE — 99213 OFFICE O/P EST LOW 20 MIN: CPT | Performed by: FAMILY MEDICINE

## 2024-01-31 PROCEDURE — 1036F TOBACCO NON-USER: CPT | Performed by: FAMILY MEDICINE

## 2024-01-31 PROCEDURE — 3079F DIAST BP 80-89 MM HG: CPT | Performed by: FAMILY MEDICINE

## 2024-01-31 PROCEDURE — 3074F SYST BP LT 130 MM HG: CPT | Performed by: FAMILY MEDICINE

## 2024-01-31 PROCEDURE — 3008F BODY MASS INDEX DOCD: CPT | Performed by: FAMILY MEDICINE

## 2024-01-31 RX ORDER — TOBRAMYCIN 3 MG/ML
2 SOLUTION/ DROPS OPHTHALMIC EVERY 4 HOURS
Qty: 5 ML | Refills: 0 | Status: SHIPPED | OUTPATIENT
Start: 2024-01-31 | End: 2024-02-07

## 2024-01-31 NOTE — PROGRESS NOTES
Chief complaint:   Chief Complaint   Patient presents with    Blepharitis     Redness, swelling, and itching on left eyelid started Sunday    Headache     Started yesterday after wearing only one contact       HPI:  Samina Celeste is a 53 y.o. female who presents for evaluation of red, swollen, painful upper left eyelid which started 1/28/2024. She states she put a new contact in the day prior and sx started 1/28/2024 and continued to worsen until she took her contact out yesterday (She has 2 week contacts but does not take them out to sleep). She started with a headache after taking out her contact as she kept the right one in. She denies any redness, discharge, change in vision. No fevers or chills, congestion or ill feelings.     Physical exam:  /80   Temp 36.2 °C (97.1 °F)   Wt 92 kg (202 lb 13.2 oz)   BMI 34.81 kg/m²   General: NAD, well appearing female  Eye: white sclera bilaterally, left upper eyelid swollen with slight worsening medial lid and white head developing, no discharge noted, normal EOM bilaterally and PERRLA  Heart: RRR, no mumur appreciated  Lungs: CTAB, no wheezes, rales, rhonchi    Assessment/Plan   Problem List Items Addressed This Visit    None  Visit Diagnoses       Blepharitis of left upper eyelid, unspecified type    -  Primary    Relevant Medications    tobramycin (Tobrex) 0.3 % ophthalmic solution        Advised warm compresses at least TID, and as often as tolerates, if not improving or develops redness/discharge of the eye, start Tobramycin drops (chronic contact wearer and does not take out to sleep). Can wash eyelid with anayeli and anayeli baby shampoo twice daily. Advised not to sleep in her contacts.     Lisa Aragon, DO

## 2024-02-26 ENCOUNTER — TELEPHONE (OUTPATIENT)
Dept: PAIN MEDICINE | Facility: CLINIC | Age: 54
End: 2024-02-26

## 2024-02-26 ENCOUNTER — OFFICE VISIT (OUTPATIENT)
Dept: PAIN MEDICINE | Facility: CLINIC | Age: 54
End: 2024-02-26
Payer: COMMERCIAL

## 2024-02-26 ENCOUNTER — HOSPITAL ENCOUNTER (OUTPATIENT)
Dept: PAIN MEDICINE | Facility: CLINIC | Age: 54
Discharge: HOME | End: 2024-02-26
Payer: COMMERCIAL

## 2024-02-26 VITALS — HEART RATE: 80 BPM | TEMPERATURE: 97.7 F | RESPIRATION RATE: 18 BRPM

## 2024-02-26 VITALS
TEMPERATURE: 96.8 F | DIASTOLIC BLOOD PRESSURE: 92 MMHG | RESPIRATION RATE: 18 BRPM | SYSTOLIC BLOOD PRESSURE: 150 MMHG | HEART RATE: 80 BPM

## 2024-02-26 DIAGNOSIS — M96.1 POSTLAMINECTOMY SYNDROME, LUMBAR: ICD-10-CM

## 2024-02-26 DIAGNOSIS — G89.29 CHRONIC BILATERAL LOW BACK PAIN WITH LEFT-SIDED SCIATICA: Primary | ICD-10-CM

## 2024-02-26 DIAGNOSIS — M54.42 CHRONIC BILATERAL LOW BACK PAIN WITH LEFT-SIDED SCIATICA: Primary | ICD-10-CM

## 2024-02-26 DIAGNOSIS — M25.551 PAIN OF BOTH HIP JOINTS: Primary | ICD-10-CM

## 2024-02-26 DIAGNOSIS — M25.552 PAIN OF BOTH HIP JOINTS: Primary | ICD-10-CM

## 2024-02-26 PROCEDURE — 7100000009 HC PHASE TWO TIME - INITIAL BASE CHARGE

## 2024-02-26 PROCEDURE — 99213 OFFICE O/P EST LOW 20 MIN: CPT | Performed by: NURSE PRACTITIONER

## 2024-02-26 PROCEDURE — 62370 ANL SP INF PMP W/MDREPRG&FIL: CPT | Performed by: PAIN MEDICINE

## 2024-02-26 PROCEDURE — 1036F TOBACCO NON-USER: CPT | Performed by: NURSE PRACTITIONER

## 2024-02-26 PROCEDURE — 7100000010 HC PHASE TWO TIME - EACH INCREMENTAL 1 MINUTE

## 2024-02-26 PROCEDURE — 99213 OFFICE O/P EST LOW 20 MIN: CPT | Mod: ZK | Performed by: NURSE PRACTITIONER

## 2024-02-26 PROCEDURE — 3077F SYST BP >= 140 MM HG: CPT | Performed by: NURSE PRACTITIONER

## 2024-02-26 PROCEDURE — 3080F DIAST BP >= 90 MM HG: CPT | Performed by: NURSE PRACTITIONER

## 2024-02-26 PROCEDURE — 3008F BODY MASS INDEX DOCD: CPT | Performed by: NURSE PRACTITIONER

## 2024-02-26 RX ORDER — HYDROMORPHONE HYDROCHLORIDE 4 MG/1
4 TABLET ORAL DAILY PRN
Qty: 30 TABLET | Refills: 0 | Status: SHIPPED | OUTPATIENT
Start: 2024-05-02 | End: 2024-05-31 | Stop reason: SDUPTHER

## 2024-02-26 RX ORDER — HYDROMORPHONE HYDROCHLORIDE 4 MG/1
4 TABLET ORAL DAILY PRN
Qty: 30 TABLET | Refills: 0 | Status: SHIPPED | OUTPATIENT
Start: 2024-04-02 | End: 2024-05-02

## 2024-02-26 RX ORDER — HYDROMORPHONE HYDROCHLORIDE 4 MG/1
4 TABLET ORAL DAILY PRN
Qty: 30 TABLET | Refills: 0 | Status: SHIPPED | OUTPATIENT
Start: 2024-03-03 | End: 2024-04-02

## 2024-02-26 ASSESSMENT — COLUMBIA-SUICIDE SEVERITY RATING SCALE - C-SSRS
6. HAVE YOU EVER DONE ANYTHING, STARTED TO DO ANYTHING, OR PREPARED TO DO ANYTHING TO END YOUR LIFE?: NO
2. HAVE YOU ACTUALLY HAD ANY THOUGHTS OF KILLING YOURSELF?: NO
1. IN THE PAST MONTH, HAVE YOU WISHED YOU WERE DEAD OR WISHED YOU COULD GO TO SLEEP AND NOT WAKE UP?: NO

## 2024-02-26 ASSESSMENT — PAIN SCALES - GENERAL
PAINLEVEL_OUTOF10: 7
PAINLEVEL_OUTOF10: 7
PAINLEVEL: 7

## 2024-02-26 ASSESSMENT — PATIENT HEALTH QUESTIONNAIRE - PHQ9
SUM OF ALL RESPONSES TO PHQ9 QUESTIONS 1 AND 2: 0
1. LITTLE INTEREST OR PLEASURE IN DOING THINGS: NOT AT ALL
2. FEELING DOWN, DEPRESSED OR HOPELESS: NOT AT ALL

## 2024-02-26 ASSESSMENT — PAIN - FUNCTIONAL ASSESSMENT
PAIN_FUNCTIONAL_ASSESSMENT: 0-10
PAIN_FUNCTIONAL_ASSESSMENT: 0-10

## 2024-02-26 NOTE — DISCHARGE INSTRUCTIONS
INTRATHECAL PAIN PUMP REFILL DISCHARGE INSTRUCTIONS        DO NOT GET INJECTION SITE WET FOR 24 HOURS  IF BANDAGE HAS BEEN PLACED YOU MAY REMOVE AFTER 24 HOURS  MONITOR FOR SIGNS/SYMPTOMS OF INFECTION:FEVERS REDNESS OR INCREASED PAIN AT INJECTION SITE   YOU MAY RESUME YOUR NORMAL ACTIVITY       IF SIGNS OR SYMPTOMS OF OPIATE OVERDOSE ARE NOTED AFTER YOUR REFILL INCLUDING UNUSUAL SLEEPINESS, UNRESPONSIVENESS, SLOW OR ABSENT BREATHING ADMINISTER NARCAN AS DIRECTED AND CALL 911    BEFORE YOU LEAVE OUR OFFICE PLEASE SCHEDULE YOUR NEXT APPOINTMENT TO SCHEDULE YOUR NEXT REFILL APPOINTMENT-IT IS IMPORTANT TO KEEP YOUR APPOINTMENTS SO THAT YOUR PUMP DOES NOT RUN OUT OF MEDICATION AS THIS WILL DAMAGE YOUR PUMP    SHOULD YOU HAVE ANY QUESTIONS OR CONCERNS PLEASE CALL THE OFFICE  [phone]

## 2024-02-26 NOTE — OP NOTE
Operation  Intrathecal pump refill.    Surgical Indications  The patient is here today to receive an intrathecal pump refill to assist with the pain.    Operative Report  The patient was taken to the procedure area, was placed into a supine positioning. The pump was interrogated and showed a refill volume of 4 mL. The pump then was emptied and a new solution of morphine and clonidine preservative-free at a dose of 10 and 0.5 mg/mL, a total volume of 20 mL was injected into the pump, and the pump was reprogrammed to deliver a dose of 1.151 mg of morphine with PTM 0.3 mg maximum 4 injections per day.  The alarm date was set for 5/17/2024. The patient recovered for sufficient amount of time and then was discharged home in stable condition. Patient was advised to followup with the [facility] to refill his pump accordingly.

## 2024-02-27 NOTE — H&P
Subjective   Patient ID: Samina Celeste is a 53 y.o. female who presents for Med Refill.  HPI  53 years old, female she is here today for a follow up/refill of medication. She is known in this clinic because of chronic back pain.  She has a spinal cord stimulator, pain pump for control of pain and is maintained on Hydromorphone to help in controlling her pain. Her level of pain today is about 7/10.  OARRS obtained and reviewed, no abuse or misuse with prescribed medication noted.  She is able to Function with activities of daily living independently.  Review of Systems   Review of systems x 10 is negative.   No recent injury or falls reported.   No recent change in medical condition reported.   No recent weakness reported.   Still able to control bowel and bladder function.   Denies fever, cough, shortness of breath recently.   No interval change with medication/health issues reported.    Objective   Physical Exam  Awake,alert, no acute distress, appropriate.  Spine is of normal curvature.  Full ROM on all 4 extremities, sensation and motor intact, no vascular compromise.  No pedal edema, normal gait.  Skin warm, dry, intact, turgor is normal.  Denies any numbness, tingling.  BP (!) 150/92   Pulse 80   Temp 36 °C (96.8 °F)   Resp 18     Assessment/Plan     I have personally reviewed the OARRS report for this patient. I have considered the risk of abuse, dependence, addiction and diversion.  I believe that it is clinically appropriate for this patient  to be prescribed this medication based on documented diagnosis.  Continue Hydromorphone as prescribed.  Today she is also for pump refill with Dr. Xiao. Follow up in 3 months time or as needed basis  Continue doing safe guidelines for preventing spread of Corona virus.  Explained plan to this patient, and patient verbalized understanding and agreement with the plan. If there is questions or concerns, please feel free to contact me to clarify.  Drug screen done  today, she reports today that she has no had any Hydromorphone sine 2/24/2024, about 2 days ago.    Chronic back pain associated with postlaminectomy syndrome.             Kerry Kennedy, DENNISE-CNP 02/27/24 7:38 AM

## 2024-03-18 ENCOUNTER — HOSPITAL ENCOUNTER (OUTPATIENT)
Dept: PAIN MEDICINE | Facility: CLINIC | Age: 54
Discharge: HOME | End: 2024-03-18
Payer: COMMERCIAL

## 2024-03-18 ENCOUNTER — APPOINTMENT (OUTPATIENT)
Dept: PAIN MEDICINE | Facility: CLINIC | Age: 54
End: 2024-03-18
Payer: COMMERCIAL

## 2024-03-18 VITALS
DIASTOLIC BLOOD PRESSURE: 101 MMHG | RESPIRATION RATE: 20 BRPM | SYSTOLIC BLOOD PRESSURE: 208 MMHG | TEMPERATURE: 95.7 F | HEART RATE: 89 BPM | OXYGEN SATURATION: 100 %

## 2024-03-18 DIAGNOSIS — M25.551 PAIN OF BOTH HIP JOINTS: ICD-10-CM

## 2024-03-18 DIAGNOSIS — M25.552 PAIN OF BOTH HIP JOINTS: ICD-10-CM

## 2024-03-18 PROCEDURE — 20610 DRAIN/INJ JOINT/BURSA W/O US: CPT | Performed by: PAIN MEDICINE

## 2024-03-18 PROCEDURE — 20610 DRAIN/INJ JOINT/BURSA W/O US: CPT | Mod: 50 | Performed by: PAIN MEDICINE

## 2024-03-18 PROCEDURE — 2500000004 HC RX 250 GENERAL PHARMACY W/ HCPCS (ALT 636 FOR OP/ED)

## 2024-03-18 RX ORDER — METHYLPREDNISOLONE ACETATE 40 MG/ML
80 INJECTION, SUSPENSION INTRA-ARTICULAR; INTRALESIONAL; INTRAMUSCULAR; SOFT TISSUE ONCE
Status: DISCONTINUED | OUTPATIENT
Start: 2024-03-18 | End: 2024-03-19 | Stop reason: HOSPADM

## 2024-03-18 RX ORDER — BUPIVACAINE HYDROCHLORIDE 2.5 MG/ML
5 INJECTION, SOLUTION EPIDURAL; INFILTRATION; INTRACAUDAL ONCE
Status: DISCONTINUED | OUTPATIENT
Start: 2024-03-18 | End: 2024-03-19 | Stop reason: HOSPADM

## 2024-03-18 ASSESSMENT — PAIN - FUNCTIONAL ASSESSMENT: PAIN_FUNCTIONAL_ASSESSMENT: 0-10

## 2024-03-18 ASSESSMENT — PAIN DESCRIPTION - DESCRIPTORS: DESCRIPTORS: ACHING

## 2024-03-18 ASSESSMENT — PAIN SCALES - GENERAL: PAINLEVEL_OUTOF10: 7

## 2024-03-18 NOTE — DISCHARGE INSTRUCTIONS
Corticosteroid Joint Injection    Why is this procedure done?  Your doctor may give you a shot of a special medicine called a corticosteroid into your joint area, to help ease swelling and pain in the joint. Your joints are places where 2 or more bones meet. You may have an injury or swelling that limits your movement and causes you pain.  What will the results be?  The treatment may:  Lower pain  Reduce swelling in the muscles, nerves, and other tissues  Improve movement  What happens before the procedure?  Your doctor will ask you about your health history. Talk to the doctor about:  All the drugs you are taking. Be sure to include all prescription and over-the-counter (OTC) drugs, vitamins, and herbal supplements. Tell the doctor about any drug allergy. Bring a list of drugs you take with you.  If you have high blood sugar or diabetes. Your drugs may need to be changed.  If you have had another steroid injection within the past 6 weeks or if you have had more than 3 injections into the same area within the past 12 months.  Any bleeding problems. Be sure to tell your doctor if you are taking any drugs that may cause bleeding. Some of these are warfarin, rivaroxaban, apixaban, ticagrelor, clopidogrel, ketorolac, ibuprofen, naproxen, or aspirin. Certain vitamins and herbs, such as garlic and fish oil, may also add to the risk for bleeding. You may need to stop these drugs as well. Talk to your doctor about them.  If you have high blood pressure.  If you have had any allergic reactions to steroids before.  What happens during the procedure?  Your doctor may do an x-ray of your joint or use x-ray during the procedure to check where to give the drug. The doctor may also use ultrasound to check. The doctor may also use a colored dye or a contrast dye to check where to inject the drug.  The doctor will clean the skin over your joint. This helps prevent infection.  You may be given a drug to help you relax. Your doctor  may numb the site of the injection.  The drug will be injected into a space in or near the joint.  The needle will be taken out and a bandage will be placed over the injection site.  What happens after the procedure?  Staff will check on you to make sure you are doing well. They will tell you when you can go home.  What care is needed at home?  Relax on the day of the injection.  Avoid heavy activity with the joint that was injected for a few days.  Apply ice to the injection site. Place an ice pack or a bag of frozen peas wrapped in a towel over the painful part. Never put ice right on the skin. Do not leave the ice on more than 10 to 15 minutes at a time.  It may take few days before you will see the effects of the injection.  What follow-up care is needed?  Your doctor may ask you to make visits to the office to check on your progress. Be sure to keep these visits. You may also need to see a physical therapist (PT). The PT will teach you exercises to help you get back your strength and motion. Ask your doctor when you can exercise.  What problems could happen?  Bleeding or bruising  Soreness at the injection site  Infection  May elevate blood pressure  Cartilage damage  Whitening or lightening of the skin around the injection site  Nerve injury  Tendon rupture  Thinning of the skin and bone around where the injection was given  If you are diabetic, your blood sugars may increase for several days    Your pain may not be gone immediately after the procedure--it usually takes the steroid 3-5 days to start working.   It may take several weeks for the medicine to reach its' full effect.   Pay attention to how much pain relief (what percentage compared to before the procedure) you get and for how long it lasts.     Activity: Avoid strenuous activity for 24 hours. After that return to your normal activity level.     Bandages: Remove after 24 hours     Showering/Bathing: You may shower after bandage is removed     Follow  up: CALL OFFICE IN 7 DAYS [phone] LEAVE MESSAGE ABOUT THE RELIEF THAT WAS OBTAINED

## 2024-03-18 NOTE — OP NOTE
Patient taken to the procedure area placed into a lateral position the right and left  hip was prepped and draped sterilely with the aid of 25-gauge needle 40 mg of Depo-Medrol diluted into 3 cc of 0.25% bupivacaine were injected around the right greater to enteric bursa with negative aspiration of heme patient tolerated the procedure well recovered for sufficient amount of time and then was discharged home in stable condition patient was advised to follow-up with the pain clinic on as-needed basis.      The above clinical summary has been dictated with voice recognition software. It has not been proofread for grammatical errors, typographical mistakes, or other semantic inconsistencies.    Thank you for visiting our office today. It was our pleasure to take part in your healthcare.     Please do not hesitate to contact the pain clinic after your visit with any questions or concerns at  M-F 8-4 pm       Jaye Xiao M.D.  Medical Director , Division of Pain Medicine [facility]   of Anesthesiology and Pain Medicine  [facility] [employer] [place] [address]     Office: [phone]  Fax: [phone]

## 2024-04-08 ENCOUNTER — APPOINTMENT (OUTPATIENT)
Dept: PAIN MEDICINE | Facility: CLINIC | Age: 54
End: 2024-04-08
Payer: MEDICARE

## 2024-04-18 ENCOUNTER — APPOINTMENT (OUTPATIENT)
Dept: RHEUMATOLOGY | Facility: CLINIC | Age: 54
End: 2024-04-18
Payer: MEDICARE

## 2024-05-01 ENCOUNTER — TELEPHONE (OUTPATIENT)
Dept: PAIN MEDICINE | Facility: CLINIC | Age: 54
End: 2024-05-01
Payer: MEDICARE

## 2024-05-01 DIAGNOSIS — M62.838 OTHER MUSCLE SPASM: ICD-10-CM

## 2024-05-01 NOTE — TELEPHONE ENCOUNTER
She calls in requesting  3 days early fill on the tizanidine  she is going out of town on the 4th could you please refill and allow her to pickup on the 3rd of May    COULD YOU PLEASE SUBMIT AN ORDER FOR THE TIZANADINE TO BE FILLED ON THE 3RD

## 2024-05-02 DIAGNOSIS — M62.838 OTHER MUSCLE SPASM: ICD-10-CM

## 2024-05-02 RX ORDER — TIZANIDINE 4 MG/1
TABLET ORAL
Qty: 270 TABLET | Refills: 0 | OUTPATIENT
Start: 2024-05-02

## 2024-05-02 RX ORDER — TIZANIDINE 4 MG/1
TABLET ORAL
Qty: 270 TABLET | Refills: 1 | Status: SHIPPED | OUTPATIENT
Start: 2024-05-02

## 2024-05-08 ENCOUNTER — TELEPHONE (OUTPATIENT)
Dept: PAIN MEDICINE | Facility: CLINIC | Age: 54
End: 2024-05-08
Payer: MEDICARE

## 2024-05-08 NOTE — TELEPHONE ENCOUNTER
Called patient to obtain insurance info to have her pump approved. Left pt vm to call the office to schedule

## 2024-05-17 ENCOUNTER — HOSPITAL ENCOUNTER (OUTPATIENT)
Dept: PAIN MEDICINE | Facility: CLINIC | Age: 54
Discharge: HOME | End: 2024-05-17
Payer: MEDICARE

## 2024-05-17 VITALS
DIASTOLIC BLOOD PRESSURE: 71 MMHG | OXYGEN SATURATION: 100 % | RESPIRATION RATE: 16 BRPM | HEART RATE: 71 BPM | TEMPERATURE: 97.3 F | SYSTOLIC BLOOD PRESSURE: 105 MMHG

## 2024-05-17 DIAGNOSIS — M96.1 POSTLAMINECTOMY SYNDROME, LUMBAR: ICD-10-CM

## 2024-05-17 PROCEDURE — 62370 ANL SP INF PMP W/MDREPRG&FIL: CPT | Performed by: PAIN MEDICINE

## 2024-05-17 PROCEDURE — C1894 INTRO/SHEATH, NON-LASER: HCPCS | Performed by: PAIN MEDICINE

## 2024-05-17 PROCEDURE — 2720000007 HC OR 272 NO HCPCS: Performed by: PAIN MEDICINE

## 2024-05-17 ASSESSMENT — PAIN SCALES - GENERAL: PAINLEVEL_OUTOF10: 7

## 2024-05-17 ASSESSMENT — PAIN - FUNCTIONAL ASSESSMENT: PAIN_FUNCTIONAL_ASSESSMENT: 0-10

## 2024-05-17 ASSESSMENT — PAIN DESCRIPTION - DESCRIPTORS: DESCRIPTORS: ACHING;DULL

## 2024-05-17 NOTE — DISCHARGE INSTRUCTIONS
INTRATHECAL PAIN PUMP REFILL DISCHARGE INSTRUCTIONS        DO NOT GET INJECTION SITE WET FOR 24 HOURS  IF BANDAGE HAS BEEN PLACED YOU MAY REMOVE AFTER 24 HOURS  MONITOR FOR SIGNS/SYMPTOMS OF INFECTION:FEVERS REDNESS OR INCREASED PAIN AT INJECTION SITE   YOU MAY RESUME YOUR NORMAL ACTIVITY       IF SIGNS OR SYMPTOMS OF OPIATE OVERDOSE ARE NOTED AFTER YOUR REFILL INCLUDING UNUSUAL SLEEPINESS, UNRESPONSIVENESS, SLOW OR ABSENT BREATHING ADMINISTER NARCAN AS DIRECTED AND CALL 911    BEFORE YOU LEAVE OUR OFFICE PLEASE SCHEDULE YOUR NEXT APPOINTMENT TO SCHEDULE YOUR NEXT REFILL APPOINTMENT-IT IS IMPORTANT TO KEEP YOUR APPOINTMENTS SO THAT YOUR PUMP DOES NOT RUN OUT OF MEDICATION AS THIS WILL DAMAGE YOUR PUMP    SHOULD YOU HAVE ANY QUESTIONS OR CONCERNS PLEASE CALL THE OFFICE  [phone]

## 2024-05-17 NOTE — OP NOTE
Operation  Intrathecal pump refill.    Surgical Indications  The patient is here today to receive an intrathecal pump refill to assist with the pain.    Operative Report  The patient was taken to the procedure area, was placed into a supine positioning. The pump was interrogated and showed a refill volume of 3 mL. The pump then was emptied and a new solution of morphine and clonidine preservative-free at a dose of 10 and 0.5 mg/mL, a total volume of 20 mL was injected into the pump, and the pump was reprogrammed to deliver a dose of 1.15 mg of morphine with PTM 0.3 mg/inj. maximum 4 injections/day per day.  The alarm date was set for 8/6/2024. The patient recovered for sufficient amount of time and then was discharged home in stable condition. Patient was advised to followup with the [facility] to refill his pump accordingly.

## 2024-05-20 ENCOUNTER — OFFICE VISIT (OUTPATIENT)
Dept: PAIN MEDICINE | Facility: CLINIC | Age: 54
End: 2024-05-20
Payer: MEDICARE

## 2024-05-20 VITALS
OXYGEN SATURATION: 98 % | HEART RATE: 71 BPM | SYSTOLIC BLOOD PRESSURE: 138 MMHG | DIASTOLIC BLOOD PRESSURE: 75 MMHG | TEMPERATURE: 97.2 F | RESPIRATION RATE: 16 BRPM

## 2024-05-20 DIAGNOSIS — G89.29 CHRONIC BILATERAL LOW BACK PAIN WITH LEFT-SIDED SCIATICA: ICD-10-CM

## 2024-05-20 DIAGNOSIS — M25.59 PAIN IN OTHER JOINT: Primary | ICD-10-CM

## 2024-05-20 DIAGNOSIS — M54.42 CHRONIC BILATERAL LOW BACK PAIN WITH LEFT-SIDED SCIATICA: ICD-10-CM

## 2024-05-20 PROCEDURE — 99214 OFFICE O/P EST MOD 30 MIN: CPT | Performed by: NURSE PRACTITIONER

## 2024-05-20 RX ORDER — HYDROMORPHONE HYDROCHLORIDE 4 MG/1
4 TABLET ORAL DAILY PRN
Qty: 30 TABLET | Refills: 0 | Status: SHIPPED | OUTPATIENT
Start: 2024-06-01 | End: 2024-07-01

## 2024-05-20 RX ORDER — HYDROMORPHONE HYDROCHLORIDE 4 MG/1
4 TABLET ORAL DAILY PRN
Qty: 30 TABLET | Refills: 0 | Status: SHIPPED | OUTPATIENT
Start: 2024-07-31 | End: 2024-08-30

## 2024-05-20 RX ORDER — HYDROMORPHONE HYDROCHLORIDE 4 MG/1
4 TABLET ORAL DAILY PRN
Qty: 30 TABLET | Refills: 0 | Status: SHIPPED | OUTPATIENT
Start: 2024-07-01 | End: 2024-07-31

## 2024-05-20 ASSESSMENT — PAIN DESCRIPTION - DESCRIPTORS: DESCRIPTORS: DULL;ACHING;THROBBING

## 2024-05-20 ASSESSMENT — PAIN SCALES - GENERAL
PAINLEVEL: 7
PAINLEVEL_OUTOF10: 7

## 2024-05-20 ASSESSMENT — PAIN - FUNCTIONAL ASSESSMENT: PAIN_FUNCTIONAL_ASSESSMENT: 0-10

## 2024-05-20 NOTE — H&P
History Of Present Illness  Samina Celeste is a 53 y.o. female presenting for follow up, refill of medications.   She is known in this clinic because of chronic back pain.  She has a spinal cord stimulator, pain pump for control of pain and is maintained on Hydromorphone to help in controlling her pain. She described positive response to the present medication therapy. Denies any side effects associated with the usage of the medication.   OARRS obtained and reviewed, no abuse or misuse with prescribed medication noted.  Her level of pain today is about 7/10 mostly in her lower back.  She is able to Function with activities of daily living independently.   She had a joint injection of right and left hip on 3/18/2024, she confirms that she had about 90% reduction in pain that lasted over 2 months.  Past Medical History  Past Medical History:   Diagnosis Date    Other conditions influencing health status     Visit For: Routine Eye Exam    Person consulting for explanation of examination or test findings     Visit for review of DEXA scan    Personal history of diseases of the skin and subcutaneous tissue     History of urticaria due to cold    Personal history of other diseases of the nervous system and sense organs     History of migraine headaches    Personal history of other medical treatment     History of mammogram    Presence of other specified functional implants     Spinal cord stimulator status       Surgical History  Past Surgical History:   Procedure Laterality Date    LUMBAR FUSION  04/08/2014    Lumbar Vertebral Fusion    OTHER SURGICAL HISTORY  08/16/2017    Neurological Surgery Laminectomy    OTHER SURGICAL HISTORY  06/11/2019    Back surgery    TONSILLECTOMY  03/26/2014    Tonsillectomy        Social History  She reports that she has quit smoking. Her smoking use included cigarettes. She has never used smokeless tobacco. She reports that she does not currently use alcohol. She reports that she does  not currently use drugs.    Family History  Family History   Problem Relation Name Age of Onset    Hypertension Mother      Allergies Mother      Rheum arthritis Mother      Diabetes Father      Coronary artery disease Father      Emphysema Father      Diabetes Maternal Grandfather      Lung cancer Paternal Grandmother          Allergies  Lactose and Tramadol    Review of Systems    Review of systems x 10 is negative.   No recent injury or falls reported.   No recent change in medical condition reported.   No recent weakness reported.   Still able to control bowel and bladder function.  Denies any problem with constipation.   Denies fever, cough, shortness of breath recently.   No interval change with medication/health issues reported.  Denies opioids diversion and abuse. Denies overuse of pain medications.    Physical Exam   Awake,alert, no acute distress, appropriate.  Spine is of normal curvature.  Full ROM on all 4 extremities, sensation and motor intact, no vascular compromise.  No pedal edema, normal gait.  Skin warm, dry, intact, turgor is normal.  Denies any numbness, tingling.  Occasional radiating pain to the left lower extremity.  Last Recorded Vitals  Blood pressure 138/75, pulse 71, temperature 36.2 °C (97.2 °F), resp. rate 16, SpO2 98%.    Relevant Results  No recent imaging noted.     Assessment/Plan     I have personally reviewed the OARRS report for this patient. I have considered the risk of abuse, dependence, addiction and diversion.  I believe that it is clinically appropriate for this patient  to be prescribed this medication based on documented diagnosis.  Continue Hydromorphone as prescribed.  She believes usage of medication improves her quality of life and allows her to participate day to day activity.  Explained plan to this patient, and patient verbalized understanding and agreement with the plan. If there is questions or concerns, please feel free to contact me to clarify at [phone],  M-F 8-4 PM.    Chronic back pain associated with postlaminectomy syndrome.          I spent 45 minutes in the professional and overall care of this patient.      Kerry Kennedy, APRN-CNP

## 2024-05-31 DIAGNOSIS — G89.29 CHRONIC BILATERAL LOW BACK PAIN WITH LEFT-SIDED SCIATICA: ICD-10-CM

## 2024-05-31 DIAGNOSIS — M54.42 CHRONIC BILATERAL LOW BACK PAIN WITH LEFT-SIDED SCIATICA: ICD-10-CM

## 2024-05-31 RX ORDER — HYDROMORPHONE HYDROCHLORIDE 4 MG/1
4 TABLET ORAL DAILY PRN
Qty: 30 TABLET | Refills: 0 | Status: SHIPPED | OUTPATIENT
Start: 2024-05-31 | End: 2024-06-30

## 2024-07-01 ENCOUNTER — LAB (OUTPATIENT)
Dept: LAB | Facility: LAB | Age: 54
End: 2024-07-01
Payer: MEDICARE

## 2024-07-01 ENCOUNTER — APPOINTMENT (OUTPATIENT)
Dept: RHEUMATOLOGY | Facility: CLINIC | Age: 54
End: 2024-07-01
Payer: MEDICARE

## 2024-07-01 VITALS
BODY MASS INDEX: 34.04 KG/M2 | HEART RATE: 70 BPM | DIASTOLIC BLOOD PRESSURE: 84 MMHG | OXYGEN SATURATION: 99 % | WEIGHT: 199.4 LBS | TEMPERATURE: 97.2 F | HEIGHT: 64 IN | SYSTOLIC BLOOD PRESSURE: 130 MMHG

## 2024-07-01 DIAGNOSIS — M35.9 UNDIFFERENTIATED CONNECTIVE TISSUE DISEASE (MULTI): Primary | ICD-10-CM

## 2024-07-01 DIAGNOSIS — M35.9 UNDIFFERENTIATED CONNECTIVE TISSUE DISEASE (MULTI): ICD-10-CM

## 2024-07-01 PROCEDURE — 84156 ASSAY OF PROTEIN URINE: CPT

## 2024-07-01 PROCEDURE — 3075F SYST BP GE 130 - 139MM HG: CPT | Performed by: INTERNAL MEDICINE

## 2024-07-01 PROCEDURE — 36415 COLL VENOUS BLD VENIPUNCTURE: CPT

## 2024-07-01 PROCEDURE — 99214 OFFICE O/P EST MOD 30 MIN: CPT | Performed by: INTERNAL MEDICINE

## 2024-07-01 PROCEDURE — 85025 COMPLETE CBC W/AUTO DIFF WBC: CPT

## 2024-07-01 PROCEDURE — 3008F BODY MASS INDEX DOCD: CPT | Performed by: INTERNAL MEDICINE

## 2024-07-01 PROCEDURE — 3079F DIAST BP 80-89 MM HG: CPT | Performed by: INTERNAL MEDICINE

## 2024-07-01 PROCEDURE — 82570 ASSAY OF URINE CREATININE: CPT

## 2024-07-01 PROCEDURE — 80048 BASIC METABOLIC PNL TOTAL CA: CPT

## 2024-07-01 PROCEDURE — 85652 RBC SED RATE AUTOMATED: CPT

## 2024-07-01 PROCEDURE — 1036F TOBACCO NON-USER: CPT | Performed by: INTERNAL MEDICINE

## 2024-07-01 ASSESSMENT — PATIENT HEALTH QUESTIONNAIRE - PHQ9
SUM OF ALL RESPONSES TO PHQ9 QUESTIONS 1 AND 2: 0
2. FEELING DOWN, DEPRESSED OR HOPELESS: NOT AT ALL
1. LITTLE INTEREST OR PLEASURE IN DOING THINGS: NOT AT ALL

## 2024-07-01 NOTE — PROGRESS NOTES
Subjective   Patient ID: Samina Celeste is a 53 y.o. female who presents for follow-up regarding undifferentiated connective tissue disease..    HPI 52 yo F here for f/u re: undifferentiated connective tissue disease (diagnosed 2004 with symptoms of severe fatigue after sun exposure, arthralgias of wrist and ankles, FAMILIA 1:80 with negative FAMILIA panel).   FAMILIA has been negative since 2017.     HCQ was reduced to 200 mg daily 6/21, without any worsening of symptoms.     She continues to deny peripheral joint pain. She denies rashes, dry eyes ,  or hair loss.  She perhaps has mild dry mouth.    X-ray of left shoulder done September 2023 shows advanced osteoarthritis of the left acromioclavicular joint.  Glenohumeral joint itself looks unremarkable.  She has no exacerbation of pain with lifting her arm overhead, or flexion of her arm across her chest.    She also the history of lumbar spondylosis, for which she has been working with Dr. Dee and Dr. Xiao.  She had L4-L5 laminectomy and discectomy with pedicle screw fixation several years ago for spondylolisthesis with herniated disc. She had an L3- L4 lumbar laminectomy 1/17 for recurrent back and leg pain.   She also has spinal cord stimulator and a pain pump.   She  takes as duloxetine 60 mg 2x daily.   She also takes Dilaudid 4 mg daily  for breakthrough pain.    She last worked 1/17. She is now on Social Security disability.     Last eye exam was 3/24 (Dr. Mascorro). Includes OCT.     Labs September 2023: CBC normal, BMP normal except sodium 129, cholesterol 141, HDL 58, LDL 62, triglycerides 107, FAMILIA negative, FAMILIA panel negative, ESR 11, C3 and C4 normal, spot urine protein to creatinine ratio 0.06  Labs November 2023: BMP normal except sodium 129  Labs November 28, 2023: Cortrosyn stimulation test was normal with baseline cortisol level 4.4, 30-minute cortisol level of 20.9, 60-minute cortisol level 28.8.     EKG December 19, 2023: Normal sinus rhythm, QTc 428  "milliseconds       ROS:  General: Denies fevers or chills.  CV: Denies chest pain or palpitations.  Denies leg edema.  Lungs: Denies coughing or shortness of breath.  Skin: Denies rashes or nodules.  MS: Denies joint pain or joint swelling.     Objective   /84 (BP Location: Left arm, Patient Position: Sitting, BP Cuff Size: Large adult)   Pulse 70   Temp 36.2 °C (97.2 °F)   Ht 1.626 m (5' 4\")   Wt 90.4 kg (199 lb 6.4 oz)   SpO2 99%   BMI 34.23 kg/m²     Physical Exam  HEENT: PERRL, EOMI  Neck: Supple, no nodes.  CV: RRR, no MGR.  Lungs: Clear, no rales or wheezes.  Abdomen: Soft, nontender. No hepatosplenomegaly.  Extremities:  No cyanosis, clubbing, or edema.  MS: No synovitis.    Skin: No rashes or nodules.    Assessment/Plan           1. Undifferentiated connective tissue disease- Stable on  mg daily.     2. Lumbar spondylosis- continue follow-up with Dr. Xiao. She is now on Social Security disability.  She has pain pump and spinal cord stimulator. She takes  hydromorphone 4 mg daily as needed for breakthrough pain.     3. BMI 34- improved. will continue to work on weight loss.    4.  Hyponatremia-sodium 129.  Cortrosyn stimulation test 12/23 was normal.     Plan:  Check CBC with diff, BMP, ESR, spot urine protein to creatinine ratio.  Follow-up in 6 months.                     "

## 2024-07-02 ENCOUNTER — APPOINTMENT (OUTPATIENT)
Dept: PRIMARY CARE | Facility: CLINIC | Age: 54
End: 2024-07-02
Payer: MEDICARE

## 2024-07-02 VITALS
BODY MASS INDEX: 35.97 KG/M2 | SYSTOLIC BLOOD PRESSURE: 142 MMHG | TEMPERATURE: 97.3 F | DIASTOLIC BLOOD PRESSURE: 78 MMHG | WEIGHT: 203 LBS | HEIGHT: 63 IN

## 2024-07-02 DIAGNOSIS — M35.9 UNDIFFERENTIATED CONNECTIVE TISSUE DISEASE (MULTI): ICD-10-CM

## 2024-07-02 DIAGNOSIS — F41.9 ANXIETY: ICD-10-CM

## 2024-07-02 DIAGNOSIS — Z12.31 ENCOUNTER FOR SCREENING MAMMOGRAM FOR BREAST CANCER: ICD-10-CM

## 2024-07-02 DIAGNOSIS — Z12.11 SCREENING FOR COLORECTAL CANCER: ICD-10-CM

## 2024-07-02 DIAGNOSIS — Z00.00 ROUTINE GENERAL MEDICAL EXAMINATION AT HEALTH CARE FACILITY: Primary | ICD-10-CM

## 2024-07-02 DIAGNOSIS — Z12.12 SCREENING FOR COLORECTAL CANCER: ICD-10-CM

## 2024-07-02 DIAGNOSIS — E66.01 OBESITY, MORBID (MULTI): ICD-10-CM

## 2024-07-02 LAB
ANION GAP SERPL CALC-SCNC: 12 MMOL/L (ref 10–20)
BASOPHILS # BLD AUTO: 0.06 X10*3/UL (ref 0–0.1)
BASOPHILS NFR BLD AUTO: 0.8 %
BUN SERPL-MCNC: 10 MG/DL (ref 6–23)
CALCIUM SERPL-MCNC: 9.9 MG/DL (ref 8.6–10.6)
CHLORIDE SERPL-SCNC: 101 MMOL/L (ref 98–107)
CO2 SERPL-SCNC: 27 MMOL/L (ref 21–32)
CREAT SERPL-MCNC: 0.83 MG/DL (ref 0.5–1.05)
CREAT UR-MCNC: 44 MG/DL (ref 20–320)
EGFRCR SERPLBLD CKD-EPI 2021: 83 ML/MIN/1.73M*2
EOSINOPHIL # BLD AUTO: 0.21 X10*3/UL (ref 0–0.7)
EOSINOPHIL NFR BLD AUTO: 3 %
ERYTHROCYTE [DISTWIDTH] IN BLOOD BY AUTOMATED COUNT: 12 % (ref 11.5–14.5)
ERYTHROCYTE [SEDIMENTATION RATE] IN BLOOD BY WESTERGREN METHOD: 7 MM/H (ref 0–30)
GLUCOSE SERPL-MCNC: 107 MG/DL (ref 74–99)
HCT VFR BLD AUTO: 35.5 % (ref 36–46)
HGB BLD-MCNC: 11.2 G/DL (ref 12–16)
IMM GRANULOCYTES # BLD AUTO: 0.01 X10*3/UL (ref 0–0.7)
IMM GRANULOCYTES NFR BLD AUTO: 0.1 % (ref 0–0.9)
LYMPHOCYTES # BLD AUTO: 2.27 X10*3/UL (ref 1.2–4.8)
LYMPHOCYTES NFR BLD AUTO: 32.1 %
MCH RBC QN AUTO: 30 PG (ref 26–34)
MCHC RBC AUTO-ENTMCNC: 31.5 G/DL (ref 32–36)
MCV RBC AUTO: 95 FL (ref 80–100)
MONOCYTES # BLD AUTO: 0.56 X10*3/UL (ref 0.1–1)
MONOCYTES NFR BLD AUTO: 7.9 %
NEUTROPHILS # BLD AUTO: 3.96 X10*3/UL (ref 1.2–7.7)
NEUTROPHILS NFR BLD AUTO: 56.1 %
NRBC BLD-RTO: 0 /100 WBCS (ref 0–0)
PLATELET # BLD AUTO: 388 X10*3/UL (ref 150–450)
POTASSIUM SERPL-SCNC: 4.3 MMOL/L (ref 3.5–5.3)
PROT UR-ACNC: <4 MG/DL (ref 5–24)
PROT/CREAT UR: ABNORMAL MG/G{CREAT}
RBC # BLD AUTO: 3.73 X10*6/UL (ref 4–5.2)
SODIUM SERPL-SCNC: 136 MMOL/L (ref 136–145)
WBC # BLD AUTO: 7.1 X10*3/UL (ref 4.4–11.3)

## 2024-07-02 PROCEDURE — 1036F TOBACCO NON-USER: CPT | Performed by: FAMILY MEDICINE

## 2024-07-02 PROCEDURE — 3077F SYST BP >= 140 MM HG: CPT | Performed by: FAMILY MEDICINE

## 2024-07-02 PROCEDURE — G0439 PPPS, SUBSEQ VISIT: HCPCS | Performed by: FAMILY MEDICINE

## 2024-07-02 PROCEDURE — 99396 PREV VISIT EST AGE 40-64: CPT | Performed by: FAMILY MEDICINE

## 2024-07-02 PROCEDURE — 3078F DIAST BP <80 MM HG: CPT | Performed by: FAMILY MEDICINE

## 2024-07-02 PROCEDURE — 3008F BODY MASS INDEX DOCD: CPT | Performed by: FAMILY MEDICINE

## 2024-07-02 RX ORDER — DULOXETIN HYDROCHLORIDE 60 MG/1
60 CAPSULE, DELAYED RELEASE ORAL 2 TIMES DAILY
Start: 2024-07-02

## 2024-07-02 RX ORDER — BUPROPION HYDROCHLORIDE 300 MG/1
300 TABLET ORAL EVERY MORNING
Start: 2024-07-02

## 2024-07-02 RX ORDER — HYDROXYCHLOROQUINE SULFATE 200 MG/1
200 TABLET, FILM COATED ORAL DAILY
Start: 2024-07-02

## 2024-07-02 ASSESSMENT — ENCOUNTER SYMPTOMS
ADENOPATHY: 0
ROS SKIN COMMENTS: NO MOLES GROWING OR CHANGING.
VOICE CHANGE: 0
BACK PAIN: 1
NAUSEA: 0
SLEEP DISTURBANCE: 1
NERVOUS/ANXIOUS: 0
WOUND: 0
WHEEZING: 0
BLOOD IN STOOL: 0
NUMBNESS: 0
ARTHRALGIAS: 1
HEADACHES: 0
DYSPHORIC MOOD: 1
SHORTNESS OF BREATH: 0
LOSS OF SENSATION IN FEET: 0
WEAKNESS: 0
DIARRHEA: 0
PALPITATIONS: 0
VOMITING: 0
DYSURIA: 0
FREQUENCY: 0
CONSTIPATION: 0
HEMATURIA: 0
SINUS PRESSURE: 0
DIZZINESS: 0
COUGH: 0
DEPRESSION: 0
ABDOMINAL PAIN: 0
SORE THROAT: 0
OCCASIONAL FEELINGS OF UNSTEADINESS: 0
FATIGUE: 0
FEVER: 0
RHINORRHEA: 0
MYALGIAS: 1

## 2024-07-02 ASSESSMENT — ACTIVITIES OF DAILY LIVING (ADL)
BATHING: INDEPENDENT
DRESSING: INDEPENDENT
DOING_HOUSEWORK: INDEPENDENT
TAKING_MEDICATION: INDEPENDENT
GROCERY_SHOPPING: INDEPENDENT
MANAGING_FINANCES: INDEPENDENT

## 2024-07-02 ASSESSMENT — PATIENT HEALTH QUESTIONNAIRE - PHQ9
1. LITTLE INTEREST OR PLEASURE IN DOING THINGS: NOT AT ALL
SUM OF ALL RESPONSES TO PHQ9 QUESTIONS 1 AND 2: 0
2. FEELING DOWN, DEPRESSED OR HOPELESS: NOT AT ALL

## 2024-07-02 NOTE — PROGRESS NOTES
Subjective   Reason for Visit: Samina Celeste is an 53 y.o. female here for a Medicare Wellness visit.          Reviewed all medications by prescribing practitioner or clinical pharmacist (such as prescriptions, OTCs, herbal therapies and supplements) and documented in the medical record.    No hospitalizations or surgeries in the last year.      Current Outpatient Medications on File Prior to Visit   Medication Sig Dispense Refill    fexofenadine (Allegra Allergy) 180 mg tablet Take 1 tablet (180 mg) by mouth once daily.      fluoride, sodium, (Prevident 5000 Booster) 1.1 % dental paste Apply 1 Application to teeth once daily.      [] HYDROmorphone (Dilaudid) 4 mg tablet Take 1 tablet (4 mg) by mouth once daily as needed for severe pain (7 - 10). Do not fill before 2024. 30 tablet 0    HYDROmorphone (Dilaudid) 4 mg tablet Take 1 tablet (4 mg) by mouth once daily as needed for severe pain (7 - 10). Do not fill before 2024. 30 tablet 0    [START ON 2024] HYDROmorphone (Dilaudid) 4 mg tablet Take 1 tablet (4 mg) by mouth once daily as needed for severe pain (7 - 10). Do not fill before 2024. 30 tablet 0    [] HYDROmorphone (Dilaudid) 4 mg tablet Take 1 tablet (4 mg) by mouth once daily as needed for severe pain (7 - 10). 30 tablet 0    metoprolol tartrate (Lopressor) 50 mg tablet TAKE 1 TABLET TWICE A  tablet 3    multivitamin-min-iron-FA-vit K (Adults Multivitamin) 18 mg iron-400 mcg-25 mcg tablet Take 1 tablet by mouth once daily.      naloxone (Narcan) 4 mg/0.1 mL nasal spray Administer 1 spray (4 mg) into affected nostril(s) if needed for respiratory depression.      omeprazole (PriLOSEC) 20 mg DR capsule TAKE 1 CAPSULE DAILY (DO NOT CRUSH OR CHEW) 90 capsule 3    saccharomyces boulardii (Florastor) 250 mg capsule Take 1 capsule (250 mg) by mouth 2 times a day.      tiZANidine (Zanaflex) 4 mg tablet TAKE 1 TABLET 3 TIMES DAILY AS NEEDED FOR MUSCLE SPASM PAIN  270 tablet 1    UNABLE TO FIND Take 10,000 mcg by mouth once daily. Med Name: BIOTIN      [DISCONTINUED] buPROPion XL (Wellbutrin XL) 300 mg 24 hr tablet TAKE 1 TABLET DAILY (Patient taking differently: Take 1 tablet (300 mg) by mouth once daily in the morning.) 90 tablet 3    [DISCONTINUED] DULoxetine (Cymbalta) 60 mg DR capsule TAKE 1 CAPSULE TWICE A DAY (Patient taking differently: Take 1 capsule (60 mg) by mouth 2 times a day.) 180 capsule 3    [DISCONTINUED] hydroxychloroquine (Plaquenil) 200 mg tablet TAKE ONE AND ONE-HALF TABLETS DAILY (Patient taking differently: Take 1 tablet (200 mg) by mouth once daily.) 135 tablet 1    [DISCONTINUED] oxyCODONE-acetaminophen (Percocet)  mg tablet Take 1 tablet by mouth once daily.       No current facility-administered medications on file prior to visit.         HPI  Tobacco Use: Medium Risk (7/2/2024)    Patient History     Smoking Tobacco Use: Former     Smokeless Tobacco Use: Never     Passive Exposure: Not on file   Quit smoking 24 years ago.  Rare alcohol. No drugs.         Patient Care Team:  Noe Velazquez DO as PCP - General     Review of Systems   Constitutional:  Negative for fatigue and fever.   HENT:  Negative for rhinorrhea, sinus pressure, sore throat and voice change.    Respiratory:  Negative for cough, shortness of breath and wheezing.    Cardiovascular:  Negative for chest pain, palpitations and leg swelling.   Gastrointestinal:  Negative for abdominal pain, blood in stool, constipation, diarrhea, nausea and vomiting.   Genitourinary:  Negative for dysuria, frequency, hematuria and vaginal bleeding.   Musculoskeletal:  Positive for arthralgias, back pain and myalgias.   Skin:  Negative for rash and wound.        No moles growing or changing.   Neurological:  Negative for dizziness, syncope, weakness, numbness and headaches.   Hematological:  Negative for adenopathy.   Psychiatric/Behavioral:  Positive for dysphoric mood (well controlledon  "dulolxetine/bupropion.) and sleep disturbance. Negative for self-injury and suicidal ideas. The patient is not nervous/anxious.        Objective   Vitals:  /78 (BP Location: Right arm, Patient Position: Sitting)   Temp 36.3 °C (97.3 °F) (Skin)   Ht 1.6 m (5' 3\")   Wt 92.1 kg (203 lb)   BMI 35.96 kg/m²       Physical Exam  Vitals reviewed.   Constitutional:       General: She is not in acute distress.     Appearance: Normal appearance. She is not ill-appearing or toxic-appearing.   HENT:      Head: Normocephalic and atraumatic.      Right Ear: Tympanic membrane, ear canal and external ear normal.      Left Ear: Tympanic membrane, ear canal and external ear normal.      Nose: Nose normal.      Mouth/Throat:      Mouth: Mucous membranes are moist.   Eyes:      Extraocular Movements: Extraocular movements intact.      Conjunctiva/sclera: Conjunctivae normal.      Pupils: Pupils are equal, round, and reactive to light.   Cardiovascular:      Rate and Rhythm: Normal rate and regular rhythm.      Heart sounds: No murmur heard.  Pulmonary:      Effort: Pulmonary effort is normal.      Breath sounds: Normal breath sounds.   Abdominal:      General: Bowel sounds are normal. There is no distension.      Palpations: Abdomen is soft. There is no mass.      Tenderness: There is no abdominal tenderness. There is no guarding or rebound.   Musculoskeletal:         General: No tenderness.      Cervical back: Neck supple.      Right lower leg: No edema.      Left lower leg: No edema.   Skin:     Coloration: Skin is not jaundiced or pale.      Findings: No rash.   Neurological:      General: No focal deficit present.      Mental Status: She is alert and oriented to person, place, and time. Mental status is at baseline.   Psychiatric:         Mood and Affect: Mood normal.         Behavior: Behavior normal.         Thought Content: Thought content normal.         Judgment: Judgment normal.         Assessment/Plan   Problem List " Items Addressed This Visit    None    Annual Wellness exam completed   Preventive Health history reviewed:  Labs ordered    Mammogram ordered  BMD not yet indicated.  Cscope or Cologuard ordered    Low dose CT chest for lung cancer screening not indicated.  Quit smoking 24 years ago.  Depression Screening done  Advanced Directives Discussion Completed  Cardiovascular risk discussed and if needed, lifestyle modifications recommended, including nutritional choices, exercise, and elimination of habits contributing to risk.  We agreed on a plan to reduce the current cardiovascular risk.  See ecalc ASCVD Risk  Plus for data discussed regarding risk and risk reduction opportunities.  Aspirin use not recommended+ after reviewing the updated guidelines.   Vaccines:  Influenza done 12/19/23  Shingrix recommended at pharmacy.      I ordered labs to be done fasting.  I ordered a colonoscopy and a mammogram.  I recommend the Shingrix vaccine to be done at your pharmacy.  Return six months for a recheck.  Weight loss is recommended.  Try to base your diet mostly on fresh fruits and vegetables and lean protein such as fish, beans and nuts.

## 2024-07-02 NOTE — PATIENT INSTRUCTIONS
I ordered labs to be done fasting.  I ordered a colonoscopy and a mammogram.  I recommend the Shingrix vaccine to be done at your pharmacy.  Return six months for a recheck.  Weight loss is recommended.  Try to base your diet mostly on fresh fruits and vegetables and lean protein such as fish, beans and nuts.

## 2024-07-09 ENCOUNTER — LAB (OUTPATIENT)
Dept: LAB | Facility: LAB | Age: 54
End: 2024-07-09
Payer: MEDICARE

## 2024-07-09 DIAGNOSIS — Z00.00 ROUTINE GENERAL MEDICAL EXAMINATION AT HEALTH CARE FACILITY: ICD-10-CM

## 2024-07-09 LAB
ALBUMIN SERPL BCP-MCNC: 4.4 G/DL (ref 3.4–5)
ALP SERPL-CCNC: 77 U/L (ref 33–110)
ALT SERPL W P-5'-P-CCNC: 18 U/L (ref 7–45)
APPEARANCE UR: CLEAR
AST SERPL W P-5'-P-CCNC: 21 U/L (ref 9–39)
BILIRUB DIRECT SERPL-MCNC: 0.1 MG/DL (ref 0–0.3)
BILIRUB SERPL-MCNC: 0.5 MG/DL (ref 0–1.2)
BILIRUB UR STRIP.AUTO-MCNC: NEGATIVE MG/DL
CHOLEST SERPL-MCNC: 145 MG/DL (ref 0–199)
CHOLESTEROL/HDL RATIO: 2.6
COLOR UR: NORMAL
EST. AVERAGE GLUCOSE BLD GHB EST-MCNC: 123 MG/DL
GLUCOSE UR STRIP.AUTO-MCNC: NORMAL MG/DL
HBA1C MFR BLD: 5.9 %
HDLC SERPL-MCNC: 56.4 MG/DL
KETONES UR STRIP.AUTO-MCNC: NEGATIVE MG/DL
LDLC SERPL CALC-MCNC: 64 MG/DL
LEUKOCYTE ESTERASE UR QL STRIP.AUTO: NEGATIVE
NITRITE UR QL STRIP.AUTO: NEGATIVE
NON HDL CHOLESTEROL: 89 MG/DL (ref 0–149)
PH UR STRIP.AUTO: 6.5 [PH]
PROT SERPL-MCNC: 7.6 G/DL (ref 6.4–8.2)
PROT UR STRIP.AUTO-MCNC: NEGATIVE MG/DL
RBC # UR STRIP.AUTO: NEGATIVE /UL
SP GR UR STRIP.AUTO: 1.01
TRIGL SERPL-MCNC: 125 MG/DL (ref 0–149)
TSH SERPL-ACNC: 5.52 MIU/L (ref 0.44–3.98)
UROBILINOGEN UR STRIP.AUTO-MCNC: NORMAL MG/DL
VLDL: 25 MG/DL (ref 0–40)

## 2024-07-09 PROCEDURE — 80061 LIPID PANEL: CPT

## 2024-07-09 PROCEDURE — 36415 COLL VENOUS BLD VENIPUNCTURE: CPT

## 2024-07-09 PROCEDURE — 80076 HEPATIC FUNCTION PANEL: CPT

## 2024-07-09 PROCEDURE — 83036 HEMOGLOBIN GLYCOSYLATED A1C: CPT

## 2024-07-09 PROCEDURE — 81003 URINALYSIS AUTO W/O SCOPE: CPT

## 2024-07-09 PROCEDURE — 84443 ASSAY THYROID STIM HORMONE: CPT

## 2024-07-14 DIAGNOSIS — E03.9 HYPOTHYROIDISM, UNSPECIFIED TYPE: Primary | ICD-10-CM

## 2024-07-14 RX ORDER — LEVOTHYROXINE SODIUM 25 UG/1
25 TABLET ORAL DAILY
Qty: 30 TABLET | Refills: 3 | Status: SHIPPED | OUTPATIENT
Start: 2024-07-14 | End: 2024-11-11

## 2024-07-19 ENCOUNTER — HOSPITAL ENCOUNTER (OUTPATIENT)
Dept: RADIOLOGY | Facility: HOSPITAL | Age: 54
Discharge: HOME | End: 2024-07-19
Payer: MEDICARE

## 2024-07-19 VITALS — WEIGHT: 200 LBS | BODY MASS INDEX: 35.44 KG/M2 | HEIGHT: 63 IN

## 2024-07-19 DIAGNOSIS — Z12.31 ENCOUNTER FOR SCREENING MAMMOGRAM FOR BREAST CANCER: ICD-10-CM

## 2024-07-19 PROCEDURE — 77067 SCR MAMMO BI INCL CAD: CPT

## 2024-07-31 ENCOUNTER — TELEPHONE (OUTPATIENT)
Dept: PRIMARY CARE | Facility: CLINIC | Age: 54
End: 2024-07-31
Payer: MEDICARE

## 2024-07-31 DIAGNOSIS — I10 HYPERTENSION, UNSPECIFIED TYPE: ICD-10-CM

## 2024-07-31 RX ORDER — METOPROLOL TARTRATE 50 MG/1
50 TABLET ORAL 2 TIMES DAILY
Qty: 180 TABLET | Refills: 3 | Status: SHIPPED | OUTPATIENT
Start: 2024-07-31

## 2024-07-31 NOTE — TELEPHONE ENCOUNTER
Pt is asking if you can resend her Metoprolol 50 MG script that was sent on 1/15/24 for a year supply to [facility] in [place]? Pt states that she doesn't use [facility] anymore.    Medication:    Metoprolol Tartrate 50 MG; Take 1 tablet twice a day.    Pharmacy: [facility]  Phone number: [phone]

## 2024-08-02 ENCOUNTER — PROCEDURE VISIT (OUTPATIENT)
Dept: PAIN MEDICINE | Facility: CLINIC | Age: 54
End: 2024-08-02
Payer: MEDICARE

## 2024-08-02 VITALS
DIASTOLIC BLOOD PRESSURE: 87 MMHG | TEMPERATURE: 95.7 F | RESPIRATION RATE: 18 BRPM | SYSTOLIC BLOOD PRESSURE: 166 MMHG | HEART RATE: 80 BPM | OXYGEN SATURATION: 100 %

## 2024-08-02 DIAGNOSIS — M96.1 POSTLAMINECTOMY SYNDROME, LUMBAR: ICD-10-CM

## 2024-08-02 PROCEDURE — 62370 ANL SP INF PMP W/MDREPRG&FIL: CPT | Performed by: PAIN MEDICINE

## 2024-08-02 NOTE — PROGRESS NOTES
Patient ID: Samina Celeste is a 53 y.o. female.    Procedures  Operation  Intrathecal pump refill.     Surgical Indications  The patient is here today to receive an intrathecal pump refill to assist with the pain.     Operative Report  The patient was taken to the procedure area, was placed into a supine positioning. The pump was interrogated and showed a refill volume of 3 mL. The pump then was emptied and a new solution of morphine and clonidine preservative-free at a dose of 10 and 0.5 mg/mL, a total volume of 20 mL was injected into the pump, and the pump was reprogrammed to deliver a dose of 1.15 mg of morphine with PTM 0.3 mg/inj. maximum 4 injections/day per day.  The alarm date was set for 10/22/2024. The patient recovered for sufficient amount of time and then was discharged home in stable condition. Patient was advised to [facility] with [facility] to refill his pump accordingly.

## 2024-08-02 NOTE — PATIENT INSTRUCTIONS
INTRATHECAL PAIN PUMP REFILL DISCHARGE INSTRUCTIONS        DO NOT GET INJECTION SITE WET FOR 24 HOURS  IF BANDAGE HAS BEEN PLACED YOU MAY REMOVE AFTER 24 HOURS  MONITOR FOR SIGNS/SYMPTOMS OF INFECTION:FEVERS REDNESS OR INCREASED PAIN AT INJECTION SITE   YOU MAY RESUME YOUR NORMAL ACTIVITY       IF SIGNS OR SYMPTOMS OF OPIATE OVERDOSE ARE NOTED AFTER YOUR REFILL INCLUDING UNUSUAL SLEEPINESS, UNRESPONSIVENESS, SLOW OR ABSENT BREATHING ADMINISTER NARCAN AS DIRECTED AND CALL [phone]    BEFORE YOU LEAVE OUR OFFICE PLEASE SCHEDULE YOUR NEXT APPOINTMENT TO SCHEDULE YOUR NEXT REFILL APPOINTMENT-IT IS IMPORTANT TO KEEP YOUR APPOINTMENTS SO THAT YOUR PUMP DOES NOT RUN OUT OF MEDICATION AS THIS WILL DAMAGE YOUR PUMP    SHOULD YOU HAVE ANY QUESTIONS OR CONCERNS PLEASE CALL THE OFFICE  [phone]

## 2024-08-14 ENCOUNTER — TELEPHONE (OUTPATIENT)
Dept: PAIN MEDICINE | Facility: CLINIC | Age: 54
End: 2024-08-14
Payer: MEDICARE

## 2024-08-14 DIAGNOSIS — M70.62 GREATER TROCHANTERIC BURSITIS OF BOTH HIPS: Primary | ICD-10-CM

## 2024-08-14 DIAGNOSIS — M70.61 GREATER TROCHANTERIC BURSITIS OF BOTH HIPS: Primary | ICD-10-CM

## 2024-08-14 DIAGNOSIS — M96.1 POSTLAMINECTOMY SYNDROME OF LUMBAR REGION: Primary | ICD-10-CM

## 2024-08-20 ENCOUNTER — TELEPHONE (OUTPATIENT)
Dept: PAIN MEDICINE | Facility: CLINIC | Age: 54
End: 2024-08-20
Payer: MEDICARE

## 2024-08-27 ENCOUNTER — OFFICE VISIT (OUTPATIENT)
Dept: PAIN MEDICINE | Facility: CLINIC | Age: 54
End: 2024-08-27
Payer: MEDICARE

## 2024-08-27 VITALS
DIASTOLIC BLOOD PRESSURE: 82 MMHG | RESPIRATION RATE: 18 BRPM | SYSTOLIC BLOOD PRESSURE: 144 MMHG | HEART RATE: 80 BPM | TEMPERATURE: 95.5 F | OXYGEN SATURATION: 93 %

## 2024-08-27 DIAGNOSIS — M54.42 CHRONIC MIDLINE LOW BACK PAIN WITH LEFT-SIDED SCIATICA: Primary | ICD-10-CM

## 2024-08-27 DIAGNOSIS — G89.29 CHRONIC MIDLINE LOW BACK PAIN WITH LEFT-SIDED SCIATICA: Primary | ICD-10-CM

## 2024-08-27 PROCEDURE — 99212 OFFICE O/P EST SF 10 MIN: CPT | Performed by: NURSE PRACTITIONER

## 2024-08-27 RX ORDER — HYDROMORPHONE HYDROCHLORIDE 4 MG/1
4 TABLET ORAL 2 TIMES DAILY PRN
Qty: 60 TABLET | Refills: 0 | Status: SHIPPED | OUTPATIENT
Start: 2024-08-30 | End: 2024-09-29

## 2024-08-27 RX ORDER — HYDROMORPHONE HYDROCHLORIDE 4 MG/1
4 TABLET ORAL 2 TIMES DAILY PRN
Qty: 60 TABLET | Refills: 0 | Status: SHIPPED | OUTPATIENT
Start: 2024-09-29 | End: 2024-10-29

## 2024-08-27 RX ORDER — HYDROMORPHONE HYDROCHLORIDE 4 MG/1
4 TABLET ORAL 2 TIMES DAILY PRN
Qty: 60 TABLET | Refills: 0 | Status: SHIPPED | OUTPATIENT
Start: 2024-10-29 | End: 2024-11-28

## 2024-08-27 ASSESSMENT — PAIN DESCRIPTION - DESCRIPTORS: DESCRIPTORS: ACHING;SHARP;SHOOTING;BURNING

## 2024-08-27 ASSESSMENT — PAIN SCALES - GENERAL
PAINLEVEL: 7
PAINLEVEL_OUTOF10: 7

## 2024-08-27 ASSESSMENT — PAIN - FUNCTIONAL ASSESSMENT: PAIN_FUNCTIONAL_ASSESSMENT: 0-10

## 2024-08-28 NOTE — H&P
History Of Present Illness  Samina Celeste is a 53 y.o. female presenting for follow up, refill of medicatio She is known in this clinic because of chronic back pain.  She has a spinal cord stimulator, pain pump for control of pain and is maintained on Hydromorphone for breakthrough pain. Her intrathecal pump delivers 1.15 mg of MS with PTM of 0.3 mg/inj max of 4 injection/day.  Her level of pain at this time is about 7/10 mostly in her lower back that radiates to the left lower extremity. She reports that the Hydromorphone is not sufficient at times in providing relief for breakthrough pain, she wants to know if there is a higher dose for Hydromorphone.  OARRS obtained and reviewed, no abuse or misuse with prescribed medication noted.  She is still able to perform activities of daily living independently.     Past Medical History  Past Medical History:   Diagnosis Date    Other conditions influencing health status     Visit For: Routine Eye Exam    Person consulting for explanation of examination or test findings     Visit for review of DEXA scan    Personal history of diseases of the skin and subcutaneous tissue     History of urticaria due to cold    Personal history of other diseases of the nervous system and sense organs     History of migraine headaches    Personal history of other medical treatment     History of mammogram    Presence of other specified functional implants     Spinal cord stimulator status       Surgical History  Past Surgical History:   Procedure Laterality Date    LUMBAR FUSION  04/08/2014    Lumbar Vertebral Fusion    OTHER SURGICAL HISTORY  08/16/2017    Neurological Surgery Laminectomy    OTHER SURGICAL HISTORY  06/11/2019    Back surgery    TONSILLECTOMY  03/26/2014    Tonsillectomy        Social History  She reports that she has quit smoking. Her smoking use included cigarettes. She has never used smokeless tobacco. She reports that she does not currently use alcohol. She reports  that she does not currently use drugs.    Family History  Family History   Problem Relation Name Age of Onset    Hypertension Mother      Allergies Mother      Rheum arthritis Mother      Diabetes Father      Coronary artery disease Father      Emphysema Father      Breast cancer Maternal Grandmother      Diabetes Maternal Grandfather      Lung cancer Paternal Grandmother          Allergies  Lactose and Tramadol    Review of Systems   Review of systems x 10 is negative.   No recent injury or falls reported.   No recent change in medical condition reported.   No recent weakness reported.   Still able to control bowel and bladder function.  Denies any problem with constipation.   Denies fever, cough, shortness of breath recently.   No interval change with medication/health issues reported.  Denies opioids diversion and abuse. Denies overuse of pain medications.    Physical Exam   Awake,alert, no acute distress, appropriate.  Spine is of normal curvature.  Full ROM on all 4 extremities, sensation and motor intact, no vascular compromise.  No pedal edema, normal gait.  Skin warm, dry, intact, turgor is normal.  Denies any numbness, tingling.  Occasional radiating pain to the left lower extremity.  Last Recorded Vitals  Blood pressure 144/82, pulse 80, temperature 35.3 °C (95.5 °F), temperature source Temporal, resp. rate 18, SpO2 93%.    Relevant Results  No recent imaging noted.     Assessment/Plan     I have personally reviewed the OARRS report for this patient. I have considered the risk of abuse, dependence, addiction and diversion.  I believe that it is clinically appropriate for this patient  to be prescribed this medication based on documented diagnosis.  Discussed case with Dr. Xiao, he recommended that we could increase Hydromorphone to 4 mg BID as needed for breakthrough pain. Advised to use the least amount of medication to keep symptoms under control or use only as prescribed. Do not increase or change  dose without consulting Pain clinic.  Continue use of spinal cord stimulator, use of pain pump for control of pain.  Follow up in 3 months time or as needed basis  Explained plan to this patient, and patient verbalized understanding and agreement with the plan. If there is questions or concerns, please feel free to contact me to clarify at [phone], M-F 8-4 PM.    Chronic back pain associated with postlaminectomy syndrome.        I spent 25 minutes in the professional and overall care of this patient.      Kerry Kennedy, APRN-CNP

## 2024-09-03 ENCOUNTER — HOSPITAL ENCOUNTER (OUTPATIENT)
Dept: PAIN MEDICINE | Facility: CLINIC | Age: 54
Discharge: HOME | End: 2024-09-03
Payer: MEDICARE

## 2024-09-03 VITALS
SYSTOLIC BLOOD PRESSURE: 122 MMHG | DIASTOLIC BLOOD PRESSURE: 66 MMHG | HEART RATE: 80 BPM | RESPIRATION RATE: 16 BRPM | OXYGEN SATURATION: 98 % | TEMPERATURE: 96.9 F

## 2024-09-03 DIAGNOSIS — M70.62 GREATER TROCHANTERIC BURSITIS OF BOTH HIPS: ICD-10-CM

## 2024-09-03 DIAGNOSIS — M70.61 GREATER TROCHANTERIC BURSITIS OF BOTH HIPS: ICD-10-CM

## 2024-09-03 PROCEDURE — 20610 DRAIN/INJ JOINT/BURSA W/O US: CPT | Performed by: PAIN MEDICINE

## 2024-09-03 PROCEDURE — 2500000004 HC RX 250 GENERAL PHARMACY W/ HCPCS (ALT 636 FOR OP/ED): Mod: JZ | Performed by: PAIN MEDICINE

## 2024-09-03 PROCEDURE — 20610 DRAIN/INJ JOINT/BURSA W/O US: CPT | Mod: 50 | Performed by: PAIN MEDICINE

## 2024-09-03 RX ORDER — BUPIVACAINE HYDROCHLORIDE 5 MG/ML
INJECTION, SOLUTION EPIDURAL; INTRACAUDAL AS NEEDED
Status: COMPLETED | OUTPATIENT
Start: 2024-09-03 | End: 2024-09-03

## 2024-09-03 RX ORDER — METHYLPREDNISOLONE ACETATE 80 MG/ML
INJECTION, SUSPENSION INTRA-ARTICULAR; INTRALESIONAL; INTRAMUSCULAR; SOFT TISSUE AS NEEDED
Status: COMPLETED | OUTPATIENT
Start: 2024-09-03 | End: 2024-09-03

## 2024-09-03 ASSESSMENT — PAIN SCALES - GENERAL: PAINLEVEL_OUTOF10: 7

## 2024-09-03 ASSESSMENT — PAIN - FUNCTIONAL ASSESSMENT: PAIN_FUNCTIONAL_ASSESSMENT: 0-10

## 2024-09-03 NOTE — DISCHARGE INSTRUCTIONS
Corticosteroid Joint Injection    Why is this procedure done?  Your doctor may give you a shot of a special medicine called a corticosteroid into your joint area, to help ease swelling and pain in the joint. Your joints are places where 2 or more bones meet. You may have an injury or swelling that limits your movement and causes you pain.  What will the results be?  The treatment may:  Lower pain  Reduce swelling in the muscles, nerves, and other tissues  Improve movement  What happens before the procedure?  Your doctor will ask you about your health history. Talk to the doctor about:  All the drugs you are taking. Be sure to include all prescription and over-the-counter (OTC) drugs, vitamins, and herbal supplements. Tell the doctor about any drug allergy. Bring a list of drugs you take with you.  If you have high blood sugar or diabetes. Your drugs may need to be changed.  If you have had another steroid injection within the past 6 weeks or if you have had more than 3 injections into the same area within the past 12 months.  Any bleeding problems. Be sure to tell your doctor if you are taking any drugs that may cause bleeding. Some of these are warfarin, rivaroxaban, apixaban, ticagrelor, clopidogrel, ketorolac, ibuprofen, naproxen, or aspirin. Certain vitamins and herbs, such as garlic and fish oil, may also add to the risk for bleeding. You may need to stop these drugs as well. Talk to your doctor about them.  If you have high blood pressure.  If you have had any allergic reactions to steroids before.  What happens during the procedure?  Your doctor may do an x-ray of your joint or use x-ray during the procedure to check where to give the drug. The doctor may also use ultrasound to check. The doctor may also use a colored dye or a contrast dye to check where to inject the drug.  The doctor will clean the skin over your joint. This helps prevent infection.  You may be given a drug to help you relax. Your doctor  may numb the site of the injection.  The drug will be injected into a space in or near the joint.  The needle will be taken out and a bandage will be placed over the injection site.  What happens after the procedure?  Staff will check on you to make sure you are doing well. They will tell you when you can go home.  What care is needed at home?  Relax on the day of the injection.  Avoid heavy activity with the joint that was injected for a few days.  Apply ice to the injection site. Place an ice pack or a bag of frozen peas wrapped in a towel over the painful part. Never put ice right on the skin. Do not leave the ice on more than 10 to 15 minutes at a time.  It may take few days before you will see the effects of the injection.  What follow-up care is needed?  Your doctor may ask you to make visits to the office to check on your progress. Be sure to keep these visits. You may also need to see a physical therapist (PT). The PT will teach you exercises to help you get back your strength and motion. Ask your doctor when you can exercise.  What problems could happen?  Bleeding or bruising  Soreness at the injection site  Infection  May elevate blood pressure  Cartilage damage  Whitening or lightening of the skin around the injection site  Nerve injury  Tendon rupture  Thinning of the skin and bone around where the injection was given  If you are diabetic, your blood sugars may increase for several days    Your pain may not be gone immediately after the procedure--it usually takes the steroid 3-5 days to start working.   It may take several weeks for the medicine to reach its' full effect.   Pay attention to how much pain relief (what percentage compared to before the procedure) you get and for how long it lasts.     Activity: Avoid strenuous activity for 24 hours. After that return to your normal activity level.     Bandages: Remove after 24 hours     Showering/Bathing: You may shower after bandage is removed     Follow  up: CALL OFFICE IN 7 DAYS [phone] LEAVE MESSAGE ABOUT THE RELIEF THAT WAS OBTAINED

## 2024-09-24 ENCOUNTER — OFFICE VISIT (OUTPATIENT)
Dept: PRIMARY CARE | Facility: CLINIC | Age: 54
End: 2024-09-24
Payer: MEDICARE

## 2024-09-24 VITALS
DIASTOLIC BLOOD PRESSURE: 72 MMHG | SYSTOLIC BLOOD PRESSURE: 128 MMHG | WEIGHT: 192 LBS | TEMPERATURE: 96.8 F | BODY MASS INDEX: 34.01 KG/M2

## 2024-09-24 DIAGNOSIS — M35.9 UNDIFFERENTIATED CONNECTIVE TISSUE DISEASE (MULTI): ICD-10-CM

## 2024-09-24 DIAGNOSIS — B09 VIRAL EXANTHEM: ICD-10-CM

## 2024-09-24 DIAGNOSIS — B01.89 VARICELLA WITH COMPLICATION: ICD-10-CM

## 2024-09-24 DIAGNOSIS — J02.9 PHARYNGITIS, UNSPECIFIED ETIOLOGY: Primary | ICD-10-CM

## 2024-09-24 LAB
BASOPHILS # BLD MANUAL: 0.09 X10*3/UL (ref 0–0.1)
BASOPHILS NFR BLD MANUAL: 1 %
BURR CELLS BLD QL SMEAR: NORMAL
EOSINOPHIL # BLD MANUAL: 0 X10*3/UL (ref 0–0.7)
EOSINOPHIL NFR BLD MANUAL: 0 %
ERYTHROCYTE [DISTWIDTH] IN BLOOD BY AUTOMATED COUNT: 12.4 % (ref 11.5–14.5)
HCT VFR BLD AUTO: 37.4 % (ref 36–46)
HGB BLD-MCNC: 12.1 G/DL (ref 12–16)
IMM GRANULOCYTES # BLD AUTO: 0.03 X10*3/UL (ref 0–0.7)
IMM GRANULOCYTES NFR BLD AUTO: 0.3 % (ref 0–0.9)
LYMPHOCYTES # BLD MANUAL: 1.53 X10*3/UL (ref 1.2–4.8)
LYMPHOCYTES NFR BLD MANUAL: 17 %
MCH RBC QN AUTO: 29.7 PG (ref 26–34)
MCHC RBC AUTO-ENTMCNC: 32.4 G/DL (ref 32–36)
MCV RBC AUTO: 92 FL (ref 80–100)
MONOCYTES # BLD MANUAL: 0.99 X10*3/UL (ref 0.1–1)
MONOCYTES NFR BLD MANUAL: 11 %
NEUTROPHILS # BLD MANUAL: 6.03 X10*3/UL (ref 1.2–7.7)
NEUTS BAND # BLD MANUAL: 0.45 X10*3/UL (ref 0–0.7)
NEUTS BAND NFR BLD MANUAL: 5 %
NEUTS SEG # BLD MANUAL: 5.58 X10*3/UL (ref 1.2–7)
NEUTS SEG NFR BLD MANUAL: 62 %
NRBC BLD-RTO: 0 /100 WBCS (ref 0–0)
PLATELET # BLD AUTO: 273 X10*3/UL (ref 150–450)
RBC # BLD AUTO: 4.08 X10*6/UL (ref 4–5.2)
RBC MORPH BLD: NORMAL
TOTAL CELLS COUNTED BLD: 100
VARIANT LYMPHS # BLD MANUAL: 0.36 X10*3/UL (ref 0–0.5)
VARIANT LYMPHS NFR BLD: 4 %
WBC # BLD AUTO: 9 X10*3/UL (ref 4.4–11.3)

## 2024-09-24 PROCEDURE — 3074F SYST BP LT 130 MM HG: CPT | Performed by: FAMILY MEDICINE

## 2024-09-24 PROCEDURE — 85027 COMPLETE CBC AUTOMATED: CPT

## 2024-09-24 PROCEDURE — 1036F TOBACCO NON-USER: CPT | Performed by: FAMILY MEDICINE

## 2024-09-24 PROCEDURE — 85007 BL SMEAR W/DIFF WBC COUNT: CPT

## 2024-09-24 PROCEDURE — 3078F DIAST BP <80 MM HG: CPT | Performed by: FAMILY MEDICINE

## 2024-09-24 PROCEDURE — 86787 VARICELLA-ZOSTER ANTIBODY: CPT

## 2024-09-24 PROCEDURE — 99214 OFFICE O/P EST MOD 30 MIN: CPT | Performed by: FAMILY MEDICINE

## 2024-09-24 RX ORDER — VALACYCLOVIR HYDROCHLORIDE 1 G/1
1000 TABLET, FILM COATED ORAL 3 TIMES DAILY
Qty: 21 TABLET | Refills: 0 | Status: SHIPPED | OUTPATIENT
Start: 2024-09-24 | End: 2024-10-01

## 2024-09-24 RX ORDER — CYCLOBENZAPRINE HCL 10 MG
10 TABLET ORAL 3 TIMES DAILY PRN
Qty: 30 TABLET | Refills: 0 | Status: SHIPPED | OUTPATIENT
Start: 2024-09-24 | End: 2024-10-15

## 2024-09-24 RX ORDER — PREDNISONE 20 MG/1
TABLET ORAL
Qty: 18 TABLET | Refills: 0 | Status: SHIPPED | OUTPATIENT
Start: 2024-09-24

## 2024-09-24 NOTE — PROGRESS NOTES
"Subjective   Patient ID: [id number]     Samina Celeste is a 53 y.o. female who presents for Sore Throat, Rash (Itchy rash all over body.  Exposure to Shingles.  Rash on back of head is painful.), and Med Refill (Cymbalta refills needed.).  HPI  She complains of a sore throat and a rash throughout her body.      She complains of a  painful rash on the back of her head.    She is concerned that she was exposed to shingles.    Her mom has shingles and she was exposed to her.      She states she had \"three  bumps worth\" of chicken pox as a young child.    She has a sore throat.  Onset Sunday.    Rash is painful and itchy and it started last night.      Objective     /72 (BP Location: Left arm, Patient Position: Sitting)   Temp 36 °C (96.8 °F) (Skin)   Wt 87.1 kg (192 lb)   BMI 34.01 kg/m²      Physical Exam  Constitutional:       Appearance: Normal appearance.   HENT:      Mouth/Throat:      Pharynx: No oropharyngeal exudate or posterior oropharyngeal erythema.      Comments: Three aphthous ulcers in the throat.  No tonsillitis.   Cardiovascular:      Rate and Rhythm: Normal rate and regular rhythm.      Heart sounds: Normal heart sounds. No murmur heard.  Pulmonary:      Effort: Pulmonary effort is normal. No respiratory distress.      Breath sounds: Normal breath sounds.   Skin:     Comments: No dermatomal rash.  Numerous papules scattered through body.  Tender and pruritic.  Some with \"dew drop\" vesicle. Appears to be the rash of chicken pox.     Neurological:      General: No focal deficit present.      Mental Status: She is alert and oriented to person, place, and time.         Assessment/Plan   Problem List Items Addressed This Visit    None  Visit Diagnoses       Pharyngitis, unspecified etiology    -  Primary    Relevant Medications    valACYclovir (Valtrex) 1 gram tablet    predniSONE (Deltasone) 20 mg tablet    Other Relevant Orders    Varicella Zoster Antibody, IgM    Viral exanthem        Relevant " Medications    valACYclovir (Valtrex) 1 gram tablet    predniSONE (Deltasone) 20 mg tablet    Other Relevant Orders    Varicella Zoster Antibody, IgM    Varicella with complication        Relevant Medications    valACYclovir (Valtrex) 1 gram tablet    predniSONE (Deltasone) 20 mg tablet    Other Relevant Orders    Varicella Zoster Antibody, IgM        This appears to be a chicken pox infection.  I ordered valtrex and prednisone.  Stop the tizanidine while you are on Valtrex.  I ordered a blood test for chicken pox infection. Return in one week if this persists or sooner if it gets worse.      Noe Velazquez, DO

## 2024-09-24 NOTE — PATIENT INSTRUCTIONS
This appears to be a chicken pox infection.  I ordered valtrex and prednisone.  Stop the tizanidine while you are on Valtrex.  I ordered a blood test for chicken pox infection. Return in one week if this persists or sooner if it gets worse

## 2024-09-27 LAB — VZV IGM SER IA-ACNC: 0.56 ISR

## 2024-09-30 ENCOUNTER — TELEPHONE (OUTPATIENT)
Dept: PAIN MEDICINE | Facility: CLINIC | Age: 54
End: 2024-09-30
Payer: MEDICARE

## 2024-09-30 DIAGNOSIS — M54.42 CHRONIC MIDLINE LOW BACK PAIN WITH LEFT-SIDED SCIATICA: Primary | ICD-10-CM

## 2024-09-30 DIAGNOSIS — G89.29 CHRONIC MIDLINE LOW BACK PAIN WITH LEFT-SIDED SCIATICA: Primary | ICD-10-CM

## 2024-09-30 RX ORDER — HYDROMORPHONE HYDROCHLORIDE 4 MG/1
4 TABLET ORAL 2 TIMES DAILY PRN
Qty: 60 TABLET | Refills: 0 | Status: SHIPPED | OUTPATIENT
Start: 2024-10-30 | End: 2024-11-29

## 2024-09-30 RX ORDER — HYDROMORPHONE HYDROCHLORIDE 4 MG/1
4 TABLET ORAL 2 TIMES DAILY PRN
Qty: 60 TABLET | Refills: 0 | Status: SHIPPED | OUTPATIENT
Start: 2024-09-30 | End: 2024-10-30

## 2024-10-08 ENCOUNTER — TELEPHONE (OUTPATIENT)
Dept: PRIMARY CARE | Facility: CLINIC | Age: 54
End: 2024-10-08
Payer: MEDICARE

## 2024-10-08 DIAGNOSIS — F41.9 ANXIETY: ICD-10-CM

## 2024-10-08 RX ORDER — BUPROPION HYDROCHLORIDE 300 MG/1
300 TABLET ORAL EVERY MORNING
Qty: 90 TABLET | Refills: 0 | Status: SHIPPED | OUTPATIENT
Start: 2024-10-08

## 2024-10-08 NOTE — TELEPHONE ENCOUNTER
REFILL  MEDICATION:     Bupropion  MG; One tablet daily in the morning.     PHARM: [facility]   PHARM NUMBER: [phone]    LR: 7/2/24  LV: 9/24/24  NV: 1/3/25

## 2024-10-09 ENCOUNTER — HOSPITAL ENCOUNTER (OUTPATIENT)
Dept: RADIOLOGY | Facility: CLINIC | Age: 54
Discharge: HOME | End: 2024-10-09
Payer: MEDICARE

## 2024-10-09 ENCOUNTER — CLINICAL SUPPORT (OUTPATIENT)
Dept: URGENT CARE | Age: 54
End: 2024-10-09
Payer: MEDICARE

## 2024-10-09 VITALS
SYSTOLIC BLOOD PRESSURE: 111 MMHG | WEIGHT: 196.21 LBS | OXYGEN SATURATION: 98 % | RESPIRATION RATE: 17 BRPM | HEIGHT: 63 IN | BODY MASS INDEX: 34.77 KG/M2 | HEART RATE: 84 BPM | TEMPERATURE: 98.4 F | DIASTOLIC BLOOD PRESSURE: 67 MMHG

## 2024-10-09 DIAGNOSIS — S92.352A CLOSED DISPLACED FRACTURE OF FIFTH METATARSAL BONE OF LEFT FOOT, INITIAL ENCOUNTER: ICD-10-CM

## 2024-10-09 DIAGNOSIS — S97.82XA CRUSHING INJURY OF LEFT FOOT, INITIAL ENCOUNTER: Primary | ICD-10-CM

## 2024-10-09 DIAGNOSIS — S97.82XA CRUSHING INJURY OF LEFT FOOT, INITIAL ENCOUNTER: ICD-10-CM

## 2024-10-09 PROCEDURE — 73630 X-RAY EXAM OF FOOT: CPT | Mod: LT

## 2024-10-09 PROCEDURE — 73630 X-RAY EXAM OF FOOT: CPT | Mod: LEFT SIDE | Performed by: RADIOLOGY

## 2024-10-09 RX ORDER — IBUPROFEN 800 MG/1
800 TABLET ORAL EVERY 8 HOURS PRN
Qty: 30 TABLET | Refills: 0 | Status: SHIPPED | OUTPATIENT
Start: 2024-10-09 | End: 2024-10-19

## 2024-10-09 ASSESSMENT — PAIN SCALES - GENERAL: PAINLEVEL: 9

## 2024-10-09 ASSESSMENT — ENCOUNTER SYMPTOMS
CARDIOVASCULAR NEGATIVE: 1
PSYCHIATRIC NEGATIVE: 1
RESPIRATORY NEGATIVE: 1
ENDOCRINE NEGATIVE: 1
EYES NEGATIVE: 1
ALLERGIC/IMMUNOLOGIC NEGATIVE: 1
HEMATOLOGIC/LYMPHATIC NEGATIVE: 1
GASTROINTESTINAL NEGATIVE: 1
CONSTITUTIONAL NEGATIVE: 1
NEUROLOGICAL NEGATIVE: 1

## 2024-10-09 ASSESSMENT — PATIENT HEALTH QUESTIONNAIRE - PHQ9
1. LITTLE INTEREST OR PLEASURE IN DOING THINGS: NOT AT ALL
2. FEELING DOWN, DEPRESSED OR HOPELESS: NOT AT ALL
SUM OF ALL RESPONSES TO PHQ9 QUESTIONS 1 AND 2: 0

## 2024-10-09 NOTE — PROGRESS NOTES
"Subjective   Patient ID: Samina Celeste is a 53 y.o. female. They present today with a chief complaint of Foot Injury (Left foot).    History of Present Illness  Patient is a 52 y/o female presenting with left lateroanterior foot pain, swelling, bruising x1 day, secondary to \"tripping on steps\" last night. Patient reports pain with movement is 9/10. Denies paresthesias including numbness/tingling of extremity, inability to bear weight or ambulate with foot, open tissue/bleeding/exudates, extremity weakness, fever, chills, bodyaches, lethargy, chest pain/tightness/pressure, SOB, wheezing, N/V/D, ABD pain. OTC Tylenol reported to be taken with little relief of symptoms.       Foot Injury         Past Medical History  Allergies as of 10/09/2024 - Reviewed 10/09/2024   Allergen Reaction Noted    Lactose Unknown 06/09/2023    Tramadol Other 06/09/2023       (Not in a hospital admission)       Past Medical History:   Diagnosis Date    Other conditions influencing health status     Visit For: Routine Eye Exam    Person consulting for explanation of examination or test findings     Visit for review of DEXA scan    Personal history of diseases of the skin and subcutaneous tissue     History of urticaria due to cold    Personal history of other diseases of the nervous system and sense organs     History of migraine headaches    Personal history of other medical treatment     History of mammogram    Presence of other specified functional implants     Spinal cord stimulator status       Past Surgical History:   Procedure Laterality Date    LUMBAR FUSION  04/08/2014    Lumbar Vertebral Fusion    OTHER SURGICAL HISTORY  08/16/2017    Neurological Surgery Laminectomy    OTHER SURGICAL HISTORY  06/11/2019    Back surgery    TONSILLECTOMY  03/26/2014    Tonsillectomy        reports that she has quit smoking. Her smoking use included cigarettes. She started smoking about 24 years ago. She has a 36 pack-year smoking history. She " "has never used smokeless tobacco. She reports that she does not currently use alcohol. She reports that she does not currently use drugs.    Review of Systems  Review of Systems   Constitutional: Negative.    HENT: Negative.     Eyes: Negative.    Respiratory: Negative.     Cardiovascular: Negative.    Gastrointestinal: Negative.    Endocrine: Negative.    Genitourinary: Negative.    Musculoskeletal:         Lateroanterior aspect of left foot pain, swelling, bruising   Allergic/Immunologic: Negative.    Neurological: Negative.    Hematological: Negative.    Psychiatric/Behavioral: Negative.                                    Objective    Vitals:    10/09/24 1556   BP: 111/67   BP Location: Left arm   Patient Position: Sitting   BP Cuff Size: Adult   Pulse: 84   Resp: 17   Temp: 36.9 °C (98.4 °F)   TempSrc: Oral   SpO2: 98%   Weight: 89 kg (196 lb 3.4 oz)   Height: 1.6 m (5' 3\")     No LMP recorded. Patient is postmenopausal.    Physical Exam  Constitutional:       Comments: Patient A/O x4, LOC 5, calm and cooperative. Patient self-ambulatory to treatment area with slight left foot limp, and is in no acute distress.    HENT:      Head: Normocephalic and atraumatic.      Nose: Nose normal.   Eyes:      Extraocular Movements: Extraocular movements intact.      Pupils: Pupils are equal, round, and reactive to light.   Cardiovascular:      Rate and Rhythm: Normal rate and regular rhythm.      Pulses: Normal pulses.      Heart sounds: Normal heart sounds.   Pulmonary:      Effort: Pulmonary effort is normal.      Breath sounds: Normal breath sounds.   Musculoskeletal:      Cervical back: Neck supple.      Comments: Left toe flexion/extension strengths are 4+/5 in comparison to right toe flexion/extension strengths of 5/5. Bilateral dorsiplantar flexion/extension, calf resistance strengths are 5/5 and equal bilaterally. Bilateral pedal and posterior tibial pulses palpated with ease. Lateroanterior aspect of left foot with " "generalized edema, erythema, ecchymosis present. Tender to touch. No open tissue or active exudates. No surrounding tissue edema, erythema extending to lateral malleolus. Sendsation to light and deep palpation intact.    Skin:     General: Skin is warm and dry.      Capillary Refill: Capillary refill takes less than 2 seconds.   Neurological:      General: No focal deficit present.      Mental Status: She is oriented to person, place, and time.   Psychiatric:         Mood and Affect: Mood normal.         Behavior: Behavior normal.         Procedures    Point of Care Test & Imaging Results from this visit  No results found for this visit on 10/09/24.   XR foot left 3+ views    Result Date: 10/9/2024  Interpreted By:  Belen Jameson, STUDY: XR FOOT LEFT 3+ VIEWS; ;  10/9/2024 4:39 pm   INDICATION: Signs/Symptoms:Left foot crushing injury after falling on steps.   ,S97.82XA Crushing injury of left foot, initial encounter   COMPARISON: 08/11/2016   ACCESSION NUMBER(S): [id number]   ORDERING CLINICIAN: ADIEL OLEARY   FINDINGS: Three views left foot: There is an oblique fracture of the distal diaphysis of the 5th metatarsal. There is a 3 mm medial displacement of the distal fracture fragment. There is no dislocation.       Spiral fracture distal diaphysis 5th metatarsal left foot.     MACRO: None   Signed by: Belen Jameson 10/9/2024 4:44 PM Dictation workstation:   [id number]     Diagnostic study results (if any) were reviewed by [facility].    Assessment/Plan   Allergies, medications, history, and pertinent labs/EKGs/Imaging reviewed by ANTON Kat.     Medical Decision Making  Patient presents with left lateroanterior foot injury secondary to \"falling on steps\" x1 day prior. Patient received left foot 3V xray; results reviewed. Closed displaced fracture of left 5th metatarsal. Spoke with patient and updated on plan of care. Patient declined in-office DME for ortho injury. Advised patient " regarding RICE therapy and ibuprofen use; patient reports not allergy or contraindication to NSAIDs. Patient to followup with Orthopedics for further evaluation and treatment of injury. At time of discharge, patient was clinically well-appearing and appropriate for outpatient management. The patient/parent/guardian was educated regarding diagnosis, supportive care, OTC and Rx medications. The patient/parent/guardian was given the opportunity to ask questions prior to discharge. They verbalized understanding of discussion of treatment plan, expected course of illness and/or injury, indications on when to return to , when to seek further evaluation in ED/call 911, and the need to follow up with PCP and/or specialist as referred. Patient/parent/guardian was provided with work/school documentation if requested. Patient stable upon discharge.      Orders and Diagnoses  Diagnoses and all orders for this visit:  Crushing injury of left foot, initial encounter  -     ibuprofen 800 mg tablet; Take 1 tablet (800 mg) by mouth every 8 hours if needed for mild pain (1 - 3) for up to 10 days.  -     XR foot left 3+ views; Future  Closed displaced fracture of fifth metatarsal bone of left foot, initial encounter      Medical Admin Record      Patient disposition: Home    Electronically signed by [facility]  5:23 PM

## 2024-10-11 ENCOUNTER — OFFICE VISIT (OUTPATIENT)
Dept: ORTHOPEDIC SURGERY | Facility: CLINIC | Age: 54
End: 2024-10-11
Payer: MEDICARE

## 2024-10-11 VITALS — HEIGHT: 63 IN | BODY MASS INDEX: 34.73 KG/M2 | WEIGHT: 196 LBS

## 2024-10-11 DIAGNOSIS — S92.352A CLOSED DISPLACED FRACTURE OF FIFTH METATARSAL BONE OF LEFT FOOT, INITIAL ENCOUNTER: ICD-10-CM

## 2024-10-11 PROCEDURE — 99203 OFFICE O/P NEW LOW 30 MIN: CPT

## 2024-10-11 PROCEDURE — 1036F TOBACCO NON-USER: CPT

## 2024-10-11 PROCEDURE — 3008F BODY MASS INDEX DOCD: CPT

## 2024-10-11 PROCEDURE — 99213 OFFICE O/P EST LOW 20 MIN: CPT

## 2024-10-13 NOTE — PROGRESS NOTES
Subjective    Patient ID: Samina Celeste is a 53 y.o. female.    Chief Complaint: Pain of the Left Great Toe    HPI  This is a pleasant 53-year-old female presenting to the office with her  for evaluation of a left foot injury, which was sustained a few days ago.  Patient states that she is unsure of the exact mechanism of injury, but immediately felt pain to her left foot.  She presented to an urgent care on October 9, where multiple view x-rays of the left foot were obtained, with evidence of a oblique fracture of the distal diaphysis of the fifth metatarsal.  There is 3 mm of medial displacement of the distal fracture fragment.  No evidence of dislocation.  She was advised to wrap her foot in an Ace wrap and follow-up with orthopedics.  Presents to the office today stating that she has been weightbearing on left lower extremity, mostly walking on the medial aspect of her foot.  She is having pain and discomfort with ambulation.  She has increased pain with range of motion of the left foot and ankle.  Denies numbness and paresthesia of the left foot.  She has been taking over-the-counter medications, elevating and applying ice with minimal relief of symptoms.  She has noticed increased bruising and swelling.    The patient's past medical, surgical, family, and social history as well as allergies and medications were reviewed and updated in the chart.    Objective   Ortho Exam  Pleasant in no acute distress.  Walks in the office today with an antalgic gait, limping on left lower extremity.  Left foot appearing with diffuse ecchymosis and minimal soft tissue swelling.  She is focally tender upon palpation of fifth metatarsal.  She has adequate range of motion of the left foot and ankle, but pain is elicited in her left dorsal lateral foot with movement.  The ankle joint is stable.  Sensation is intact to light touch.    Image Results:  XR foot left 3+ views  Narrative: Interpreted By:  Belen Jameson,    STUDY:  XR FOOT LEFT 3+ VIEWS; ;  10/9/2024 4:39 pm      INDICATION:  Signs/Symptoms:Left foot crushing injury after falling on steps.      ,S97.82XA Crushing injury of left foot, initial encounter      COMPARISON:  08/11/2016      ACCESSION NUMBER(S):  [id number]      ORDERING CLINICIAN:  ADIEL OLEARY      FINDINGS:  Three views left foot:  There is an oblique fracture of the distal diaphysis of the 5th  metatarsal. There is a 3 mm medial displacement of the distal  fracture fragment. There is no dislocation.      Impression: Spiral fracture distal diaphysis 5th metatarsal left foot.          MACRO:  None      Signed by: Belen Jameson 10/9/2024 4:44 PM  Dictation workstation:   [id number]    Multiple view x-rays of the left foot obtained on October 9, 2024 personally reviewed, with evidence of a spiral fracture of the distal diaphysis of fifth metatarsal.    Assessment/Plan   Encounter Diagnoses:  Closed displaced fracture of fifth metatarsal bone of left foot, initial encounter    Plan: Discussion with patient in regards to left foot fracture with review of x-rays.  Explained to patient that this fracture has 2 parts and is displaced.  I do have concern that this may be surgical.  I have advised that patient follow-up with a foot and ankle specialist to discuss surgical versus nonoperative treatment.  I have placed patient in a short walking boot and have advised that patient nonweightbearing until seen by the foot and ankle specialist.  I did offer patient crutches, but she states that she has a pair at home.  She can continue to take Tylenol, elevate and apply ice to help decrease pain inflammation.  She will follow-up with a foot and ankle orthopedic specialist for further treatment options.    Orders Placed This Encounter    Referral to Orthopaedic Surgery     No follow-ups on file.

## 2024-10-14 ENCOUNTER — HOSPITAL ENCOUNTER (OUTPATIENT)
Dept: PAIN MEDICINE | Facility: CLINIC | Age: 54
Discharge: HOME | End: 2024-10-14
Payer: MEDICARE

## 2024-10-14 DIAGNOSIS — M96.1 POSTLAMINECTOMY SYNDROME OF LUMBAR REGION: ICD-10-CM

## 2024-10-14 PROCEDURE — 62370 ANL SP INF PMP W/MDREPRG&FIL: CPT | Performed by: PAIN MEDICINE

## 2024-10-14 PROCEDURE — C1894 INTRO/SHEATH, NON-LASER: HCPCS | Performed by: PAIN MEDICINE

## 2024-10-14 PROCEDURE — 2720000007 HC OR 272 NO HCPCS: Performed by: PAIN MEDICINE

## 2024-10-14 NOTE — DISCHARGE INSTRUCTIONS
INTRATHECAL PAIN PUMP REFILL DISCHARGE INSTRUCTIONS        DO NOT GET INJECTION SITE WET FOR 24 HOURS  IF BANDAGE HAS BEEN PLACED YOU MAY REMOVE AFTER 24 HOURS  MONITOR FOR SIGNS/SYMPTOMS OF INFECTION:FEVERS REDNESS OR INCREASED PAIN AT INJECTION SITE   YOU MAY RESUME YOUR NORMAL ACTIVITY       IF SIGNS OR SYMPTOMS OF OPIATE OVERDOSE ARE NOTED AFTER YOUR REFILL INCLUDING UNUSUAL SLEEPINESS, UNRESPONSIVENESS, SLOW OR ABSENT BREATHING ADMINISTER NARCAN AS DIRECTED AND CALL 911    BEFORE YOU LEAVE OUR OFFICE PLEASE SCHEDULE YOUR NEXT APPOINTMENT TO SCHEDULE YOUR NEXT REFILL APPOINTMENT-IT IS IMPORTANT TO KEEP YOUR APPOINTMENTS SO THAT YOUR PUMP DOES NOT RUN OUT OF MEDICATION AS THIS WILL DAMAGE YOUR PUMP    SHOULD YOU HAVE ANY QUESTIONS OR CONCERNS PLEASE CALL THE OFFICE  [phone]

## 2024-10-15 ENCOUNTER — OFFICE VISIT (OUTPATIENT)
Dept: ORTHOPEDIC SURGERY | Facility: CLINIC | Age: 54
End: 2024-10-15
Payer: MEDICARE

## 2024-10-15 DIAGNOSIS — S92.352A CLOSED DISPLACED FRACTURE OF FIFTH METATARSAL BONE OF LEFT FOOT, INITIAL ENCOUNTER: Primary | ICD-10-CM

## 2024-10-15 PROCEDURE — 99214 OFFICE O/P EST MOD 30 MIN: CPT | Performed by: ORTHOPAEDIC SURGERY

## 2024-10-15 NOTE — PATIENT INSTRUCTIONS
YOUR BOOT INSTRUCTIONS:  You can put weight on your foot and ankle while in the boot with crutches   You can use crutches or walker for support as needed.   You should wear boot when walking or standing   You can take off your boot while bathing, sitting, stretching, icing or doing therapy exercises.  You can take your boot off at nighttime while sleeping.    You can also use OTC Voltaren gel or  aspercreme ointment and apply it to the injured area.      Ice and elevate supported at the calf to reduce swelling    Patient will increase their dietary calcium intake (milk,yogurt) and should get 1200 mg of calcium per day. They will also take a vitamin D supplement.    Follow up in 2-3 weeks

## 2024-10-15 NOTE — PROGRESS NOTES
History of Present Illness  53 y.o.female here for left foot pain  1. Closed displaced fracture of fifth metatarsal bone of left foot, initial encounter          Mechanism of injury: inversion, fall walking into house  Date of Injury/Pain: 10/8/24  Location of pain:  lateral foot  Frequency of Pain: worse with walking or standing  Associated symptoms? Swelling.  Previous treatment? UC, xrays, walking boot; walking on boot      27 point review of systems negative except what is stated in HPI    Past Medical History:   Diagnosis Date    Other conditions influencing health status     Visit For: Routine Eye Exam    Person consulting for explanation of examination or test findings     Visit for review of DEXA scan    Personal history of diseases of the skin and subcutaneous tissue     History of urticaria due to cold    Personal history of other diseases of the nervous system and sense organs     History of migraine headaches    Personal history of other medical treatment     History of mammogram    Presence of other specified functional implants     Spinal cord stimulator status        Allergies   Allergen Reactions    Lactose Unknown    Tramadol Other     CONFUSION        Past Surgical History:   Procedure Laterality Date    LUMBAR FUSION  04/08/2014    Lumbar Vertebral Fusion    OTHER SURGICAL HISTORY  08/16/2017    Neurological Surgery Laminectomy    OTHER SURGICAL HISTORY  06/11/2019    Back surgery    TONSILLECTOMY  03/26/2014    Tonsillectomy        Family History   Problem Relation Name Age of Onset    Hypertension Mother      Allergies Mother      Rheum arthritis Mother      Diabetes Father      Coronary artery disease Father      Emphysema Father      Breast cancer Maternal Grandmother      Diabetes Maternal Grandfather      Lung cancer Paternal Grandmother          Social History     Socioeconomic History    Marital status:      Spouse name: Not on file    Number of children: Not on file    Years of  education: Not on file    Highest education level: Not on file   Occupational History    Not on file   Tobacco Use    Smoking status: Former     Current packs/day: 1.50     Average packs/day: 1.5 packs/day for 24.0 years (36.0 ttl pk-yrs)     Types: Cigarettes     Start date: 10/9/2000    Smokeless tobacco: Never   Vaping Use    Vaping status: Never Used   Substance and Sexual Activity    Alcohol use: Not Currently     Alcohol/week: 0.0 - 1.0 standard drinks of alcohol    Drug use: Not Currently    Sexual activity: Not on file   Other Topics Concern    Not on file   Social History Narrative    Not on file     Social Determinants of Health     Financial Resource Strain: Not on file   Food Insecurity: Not on file   Transportation Needs: Not on file   Physical Activity: Not on file   Stress: Not on file   Social Connections: Not on file   Intimate Partner Violence: Not on file   Housing Stability: Not on file        CURRENT MEDICATIONS:   Current Outpatient Medications   Medication Sig Dispense Refill    buPROPion XL (Wellbutrin XL) 300 mg 24 hr tablet Take 1 tablet (300 mg) by mouth once daily in the morning. Do not crush, chew, or split. 90 tablet 0    cyclobenzaprine (Flexeril) 10 mg tablet Take 1 tablet (10 mg) by mouth 3 times a day as needed for muscle spasms for up to 21 days. 30 tablet 0    DULoxetine (Cymbalta) 60 mg DR capsule Take 1 capsule (60 mg) by mouth 2 times a day. Do not crush or chew.      fexofenadine (Allegra Allergy) 180 mg tablet Take 1 tablet (180 mg) by mouth once daily.      fluoride, sodium, (Prevident 5000 Booster) 1.1 % dental paste Apply 1 Application to teeth once daily.      HYDROmorphone (Dilaudid) 4 mg tablet Take 1 tablet (4 mg) by mouth 2 times a day as needed for severe pain (7 - 10). Do not fill before September 29, 2024. 60 tablet 0    [START ON 10/29/2024] HYDROmorphone (Dilaudid) 4 mg tablet Take 1 tablet (4 mg) by mouth 2 times a day as needed for severe pain (7 - 10). Do not  fill before October 29, 2024. 60 tablet 0    HYDROmorphone (Dilaudid) 4 mg tablet Take 1 tablet (4 mg) by mouth 2 times a day as needed for severe pain (7 - 10). 60 tablet 0    [START ON 10/30/2024] HYDROmorphone (Dilaudid) 4 mg tablet Take 1 tablet (4 mg) by mouth 2 times a day as needed for severe pain (7 - 10). Do not fill before October 30, 2024. 60 tablet 0    hydroxychloroquine (Plaquenil) 200 mg tablet Take 1 tablet (200 mg) by mouth once daily.      ibuprofen 800 mg tablet Take 1 tablet (800 mg) by mouth every 8 hours if needed for mild pain (1 - 3) for up to 10 days. 30 tablet 0    levothyroxine (Synthroid, Levoxyl) 25 mcg tablet Take 1 tablet (25 mcg) by mouth early in the morning.. Take on an empty stomach at the same time each day, either 30 to 60 minutes prior to breakfast 30 tablet 3    metoprolol tartrate (Lopressor) 50 mg tablet Take 1 tablet by mouth 2 times a day. 180 tablet 3    multivitamin-min-iron-FA-vit K (Adults Multivitamin) 18 mg iron-400 mcg-25 mcg tablet Take 1 tablet by mouth once daily.      naloxone (Narcan) 4 mg/0.1 mL nasal spray Administer 1 spray (4 mg) into affected nostril(s) if needed for respiratory depression.      omeprazole (PriLOSEC) 20 mg DR capsule TAKE 1 CAPSULE DAILY (DO NOT CRUSH OR CHEW) 90 capsule 3    predniSONE (Deltasone) 20 mg tablet Take three po every day for three days then two po every day for three days then one po every day for three days then stop. 18 tablet 0    saccharomyces boulardii (Florastor) 250 mg capsule Take 1 capsule (250 mg) by mouth 2 times a day.      tiZANidine (Zanaflex) 4 mg tablet TAKE 1 TABLET 3 TIMES DAILY AS NEEDED FOR MUSCLE SPASM PAIN 270 tablet 1    UNABLE TO FIND Take 10,000 mcg by mouth once daily. Med Name: BIOTIN       No current facility-administered medications for this visit.        Constitutional Exam: patient's height and weight reviewed, well-kempt  Psychiatric Exam: alert and oriented x 3, appropriate mood and  behavior  Eye Exam: MANE, EOMI  Pulmonary Exam: breathing non-labored, no apparent distress  Lymphatic exam: no appreciable lymphadenopathy in the lower extremities  Cardiovascular exam: DP pulses 2+ bilaterally, PT 2+ bilaterally, toes are pink with good capillary refill, no pitting edema  Skin exam: no open lesions, rashes, abrasions or ulcerations  Neurological exam: sensation to light touch intact in both lower extremities in peripheral and dermatomal distributions (except for any abnormalities noted in musculoskeletal exam)      PHYSICAL EXAM  On examination of the left ankle/foot:  Normal gait in boot  Normal alignment  Minimal swelling and ecchymosis of the lateral foot. no erythema. Skin intact; no ulcers or lesions.   Normal ROM in  ankle plantarflexion, dorsiflexion, inversion and eversion; minimal decreased ROM in 2-5th toe flexion/extension and 1st MTP flexion/extension  Normal strength in ankle plantarflexion, dorsiflexion, inversion and eversion; normal strength with great toe flexion/extension. Some pain with eversion  Tenderness to palpation: 5th metatarsal  No tenderness to palpation over the Achilles, medial and lateral malleolus, posterior tibial tendon, peroneal tendon, ATFL, deltoid ligament, talus or navicular.  no tenderness to palpation over heel, plantar arch, base of 1st metatarsal/2nd metatarsal, sesamoids, medial cuneiform, navicular, 1st MTP joint or 2nd or 3rd intermetarsal space.  Neurovascularly intact. Good capillary refill. No sensory deficit to light touch. Normal proprioception. Dorsalis pedis and posterior tibial pulses 2+ bilaterally.    - Rachel's test                                - Anterior Drawer test                      - Talar tilt test                                    - shuck testing  - Squeeze test    DATA/RESULTS  I personally reviewed the patient's x-ray images and reports of the left foot. The xrays show a fracture of the 5th metatarsal neck. No other fractures  or dislocation.  Normal joint spacing     1. Closed displaced fracture of fifth metatarsal bone of left foot, initial encounter            ASSESSMENT: left foot 5th metatarsal fracture    PLAN: Treatment options were discussed with the patient. Patient was placed in a tall CAM walker boot to be WBAT with crutches.  They were given boot instructions. Patient was given a handout and instructed on an at home stretching program.  They should do these exercises 3 times per day for 6 weeks and then daily. Patient can use OTC aspercream for pain and continue to ice and elevate supported at the calf to reduce swelling. Patient will increase their dietary calcium intake (milk,yogurt) and should get 1500 mg of calcium per day. They will also take a vitamin D supplement. All the patient's questions were answered. The patient agrees with the above plan.  Follow up in 2-3 weeks with repeat left foot xrays with AP, lateral and oblique views to evaluate bone healing.

## 2024-11-04 DIAGNOSIS — M54.42 CHRONIC MIDLINE LOW BACK PAIN WITH LEFT-SIDED SCIATICA: ICD-10-CM

## 2024-11-04 DIAGNOSIS — G89.29 CHRONIC MIDLINE LOW BACK PAIN WITH LEFT-SIDED SCIATICA: ICD-10-CM

## 2024-11-04 RX ORDER — HYDROMORPHONE HYDROCHLORIDE 4 MG/1
4 TABLET ORAL 2 TIMES DAILY PRN
Qty: 60 TABLET | Refills: 0 | Status: SHIPPED | OUTPATIENT
Start: 2024-11-04 | End: 2024-12-04

## 2024-11-05 ENCOUNTER — HOSPITAL ENCOUNTER (OUTPATIENT)
Dept: RADIOLOGY | Facility: CLINIC | Age: 54
Discharge: HOME | End: 2024-11-05
Payer: MEDICARE

## 2024-11-05 ENCOUNTER — OFFICE VISIT (OUTPATIENT)
Dept: ORTHOPEDIC SURGERY | Facility: CLINIC | Age: 54
End: 2024-11-05
Payer: MEDICARE

## 2024-11-05 ENCOUNTER — PREP FOR PROCEDURE (OUTPATIENT)
Dept: ORTHOPEDIC SURGERY | Facility: CLINIC | Age: 54
End: 2024-11-05

## 2024-11-05 ENCOUNTER — TELEPHONE (OUTPATIENT)
Dept: PRIMARY CARE | Facility: CLINIC | Age: 54
End: 2024-11-05
Payer: MEDICARE

## 2024-11-05 DIAGNOSIS — S92.352A CLOSED DISPLACED FRACTURE OF FIFTH METATARSAL BONE OF LEFT FOOT, INITIAL ENCOUNTER: Primary | ICD-10-CM

## 2024-11-05 DIAGNOSIS — K21.9 GASTROESOPHAGEAL REFLUX DISEASE, UNSPECIFIED WHETHER ESOPHAGITIS PRESENT: ICD-10-CM

## 2024-11-05 DIAGNOSIS — F41.9 ANXIETY: ICD-10-CM

## 2024-11-05 DIAGNOSIS — S92.352A CLOSED DISPLACED FRACTURE OF FIFTH METATARSAL BONE OF LEFT FOOT, INITIAL ENCOUNTER: ICD-10-CM

## 2024-11-05 PROCEDURE — 99214 OFFICE O/P EST MOD 30 MIN: CPT | Performed by: ORTHOPAEDIC SURGERY

## 2024-11-05 PROCEDURE — 73630 X-RAY EXAM OF FOOT: CPT | Mod: LT

## 2024-11-05 PROCEDURE — 73630 X-RAY EXAM OF FOOT: CPT | Mod: LEFT SIDE | Performed by: RADIOLOGY

## 2024-11-05 RX ORDER — DULOXETIN HYDROCHLORIDE 60 MG/1
60 CAPSULE, DELAYED RELEASE ORAL 2 TIMES DAILY
Qty: 180 CAPSULE | Refills: 0 | Status: SHIPPED | OUTPATIENT
Start: 2024-11-05

## 2024-11-05 RX ORDER — OMEPRAZOLE 20 MG/1
20 CAPSULE, DELAYED RELEASE ORAL DAILY
Qty: 90 CAPSULE | Refills: 3 | Status: SHIPPED | OUTPATIENT
Start: 2024-11-05

## 2024-11-05 NOTE — TELEPHONE ENCOUNTER
REFILL  MEDICATION:     Duloxetine 60 MG DR; Take 1 capsule 2 times a day.     LR: 7/2/24    Omeprazole 20 MG DR; Take 1 capsule daily.    LR: 12/26/23       90 capsules with 3 refills     PHARM: [facility]   PHARM NUMBER: [phone]    LV: 9/24/24  NV: 1/3/25

## 2024-11-05 NOTE — PROGRESS NOTES
History of Present Illness  53 y.o.female here for left foot pain  1. Closed displaced fracture of fifth metatarsal bone of left foot, initial encounter  XR foot left 3+ views        Mechanism of injury: inversion, fall walking into house  Date of Injury/Pain: 10/8/24  Location of pain:  lateral foot  Frequency of Pain: worse with walking or standing  Associated symptoms? Swelling.  Previous treatment? UC, xrays, walking boot; walking on boot    On 11/5/24; patient here for fuv left foot fx. She has been WBAT in the boot. She has been taking care of her mother who requires assistance with transfers and has been pushing her mom's wheelchair; particularly through the hospital going back and forth for medical appointments.  Pain persistent and unchanged in left foot over 5th metatarsal. Taking calcium and vit D.     She is nonsmoker, quit cigarettes over 20 years ago.  She is on chronic dilaudid due to her pain disorder.  Her BP is controlled on medication.      Past Medical History:   Diagnosis Date    Other conditions influencing health status     Visit For: Routine Eye Exam    Person consulting for explanation of examination or test findings     Visit for review of DEXA scan    Personal history of diseases of the skin and subcutaneous tissue     History of urticaria due to cold    Personal history of other diseases of the nervous system and sense organs     History of migraine headaches    Personal history of other medical treatment     History of mammogram    Presence of other specified functional implants     Spinal cord stimulator status        Allergies   Allergen Reactions    Lactose Unknown    Tramadol Other     CONFUSION        Past Surgical History:   Procedure Laterality Date    LUMBAR FUSION  04/08/2014    Lumbar Vertebral Fusion    OTHER SURGICAL HISTORY  08/16/2017    Neurological Surgery Laminectomy    OTHER SURGICAL HISTORY  06/11/2019    Back surgery    TONSILLECTOMY  03/26/2014    Tonsillectomy         Family History   Problem Relation Name Age of Onset    Hypertension Mother      Allergies Mother      Rheum arthritis Mother      Diabetes Father      Coronary artery disease Father      Emphysema Father      Breast cancer Maternal Grandmother      Diabetes Maternal Grandfather      Lung cancer Paternal Grandmother          Social History     Socioeconomic History    Marital status:      Spouse name: Not on file    Number of children: Not on file    Years of education: Not on file    Highest education level: Not on file   Occupational History    Not on file   Tobacco Use    Smoking status: Former     Current packs/day: 1.50     Average packs/day: 1.5 packs/day for 24.1 years (36.1 ttl pk-yrs)     Types: Cigarettes     Start date: 10/9/2000    Smokeless tobacco: Never   Vaping Use    Vaping status: Never Used   Substance and Sexual Activity    Alcohol use: Not Currently     Alcohol/week: 0.0 - 1.0 standard drinks of alcohol    Drug use: Not Currently    Sexual activity: Not on file   Other Topics Concern    Not on file   Social History Narrative    Not on file     Social Drivers of Health     Financial Resource Strain: Not on file   Food Insecurity: Not on file   Transportation Needs: Not on file   Physical Activity: Not on file   Stress: Not on file   Social Connections: Not on file   Intimate Partner Violence: Not on file   Housing Stability: Not on file        CURRENT MEDICATIONS:   Current Outpatient Medications   Medication Sig Dispense Refill    buPROPion XL (Wellbutrin XL) 300 mg 24 hr tablet Take 1 tablet (300 mg) by mouth once daily in the morning. Do not crush, chew, or split. 90 tablet 0    cyclobenzaprine (Flexeril) 10 mg tablet Take 1 tablet (10 mg) by mouth 3 times a day as needed for muscle spasms for up to 21 days. 30 tablet 0    DULoxetine (Cymbalta) 60 mg DR capsule Take 1 capsule (60 mg) by mouth 2 times a day. Do not crush or chew.      fexofenadine (Allegra Allergy) 180 mg tablet Take  1 tablet (180 mg) by mouth once daily.      fluoride, sodium, (Prevident 5000 Booster) 1.1 % dental paste Apply 1 Application to teeth once daily.      HYDROmorphone (Dilaudid) 4 mg tablet Take 1 tablet (4 mg) by mouth 2 times a day as needed for severe pain (7 - 10). Do not fill before October 30, 2024. 60 tablet 0    HYDROmorphone (Dilaudid) 4 mg tablet Take 1 tablet (4 mg) by mouth 2 times a day as needed for severe pain (7 - 10). 60 tablet 0    hydroxychloroquine (Plaquenil) 200 mg tablet Take 1 tablet (200 mg) by mouth once daily.      levothyroxine (Synthroid, Levoxyl) 25 mcg tablet Take 1 tablet (25 mcg) by mouth early in the morning.. Take on an empty stomach at the same time each day, either 30 to 60 minutes prior to breakfast 30 tablet 3    metoprolol tartrate (Lopressor) 50 mg tablet Take 1 tablet by mouth 2 times a day. 180 tablet 3    multivitamin-min-iron-FA-vit K (Adults Multivitamin) 18 mg iron-400 mcg-25 mcg tablet Take 1 tablet by mouth once daily.      naloxone (Narcan) 4 mg/0.1 mL nasal spray Administer 1 spray (4 mg) into affected nostril(s) if needed for respiratory depression.      omeprazole (PriLOSEC) 20 mg DR capsule TAKE 1 CAPSULE DAILY (DO NOT CRUSH OR CHEW) 90 capsule 3    predniSONE (Deltasone) 20 mg tablet Take three po every day for three days then two po every day for three days then one po every day for three days then stop. 18 tablet 0    saccharomyces boulardii (Florastor) 250 mg capsule Take 1 capsule (250 mg) by mouth 2 times a day.      tiZANidine (Zanaflex) 4 mg tablet TAKE 1 TABLET 3 TIMES DAILY AS NEEDED FOR MUSCLE SPASM PAIN 270 tablet 0    UNABLE TO FIND Take 10,000 mcg by mouth once daily. Med Name: BIOTIN       No current facility-administered medications for this visit.        Constitutional Exam: patient's height and weight reviewed, well-kempt  Psychiatric Exam: alert and oriented x 3, appropriate mood and behavior  Eye Exam: MANE, EOMI  Pulmonary Exam: breathing  non-labored, no apparent distress  Lymphatic exam: no appreciable lymphadenopathy in the lower extremities  Cardiovascular exam: DP pulses 2+ bilaterally, PT 2+ bilaterally, toes are pink with good capillary refill, no pitting edema  Skin exam: no open lesions, rashes, abrasions or ulcerations  Neurological exam: sensation to light touch intact in both lower extremities in peripheral and dermatomal distributions (except for any abnormalities noted in musculoskeletal exam)      PHYSICAL EXAM  On examination of the left ankle/foot:  Normal alignment  Swelling and ecchymosis of the lateral foot. no erythema. Skin intact; no ulcers or lesions.   Normal ROM in  ankle plantarflexion, dorsiflexion, inversion and eversion; minimal decreased ROM in 2-5th toe flexion/extension and 1st MTP flexion/extension  Motor intact ankle plantarflexion, dorsiflexion, inversion and eversion; normal strength with great toe flexion/extension. Tenderness to palpation: 5th metatarsal shaft  No tenderness to palpation over the Achilles, medial and lateral malleolus, posterior tibial tendon, peroneal tendon, ATFL, deltoid ligament, talus or navicular.  no tenderness to palpation over heel, plantar arch, base of 1st metatarsal/2nd metatarsal, sesamoids, medial cuneiform, navicular, 1st MTP joint or 2nd or 3rd intermetarsal space.  Neurovascularly intact. Good capillary refill. No sensory deficit to light touch. Normal proprioception. Dorsalis pedis and posterior tibial pulses 2+ bilaterally.    - Rachel's test                                - Anterior Drawer test                      - Talar tilt test                                    - shuck testing  - Squeeze test    DATA/RESULTS  I personally reviewed the patient's x-ray images and reports of the left foot. The xrays show further displacement of 5th metatarsal neck and shaft fracture, increased displacement as compared to prior xrays. No other fractures or dislocation.  Normal joint  spacing     1. Closed displaced fracture of fifth metatarsal bone of left foot, initial encounter  XR foot left 3+ views          ASSESSMENT: left foot 5th metatarsal displaced fracture    PLAN: Discussed with the patient that due to further displacement particularly lateral where there is not good bony contact anymore, we would recommend surgical open reduction internal fixation of the fracture utilizing lag screw and plate with screws technique.  It is unlikely that we will get a good union with the current amount of fracture displacement.  She does have a challenge and that she takes care of her mom who is in a wheelchair and requires assistance with transfers, self-care and other transportation to and from medical appointments.  We discussed with her the option of use of home nursing companies such as [employer].  The patient does have a home health aide that just comes in once a week and the daughter thinks that she can maybe increase the visits.  Also discussed with the patient that she would have to not drive for at least 8 to 10 weeks after surgery.  She would have to be nonweightbearing after surgery for approximately 6 weeks to let the bone heal appropriately.  Use of knee walker recommended, other option could be crutches or iwalk device that she can get online.  She currently has a knee walker.  No personal or family history of any DVT but she would have to be on oral aspirin daily.  Discussed with her he would be outpatient surgery done under general anesthetic with use of popliteal block to decrease need for postoperative narcotics.  The patient is on chronic Dilaudid for her pain disorder (and pain pump for her back) and so we will reach out to her pain management team with respect to the possibility of using medication for breakthrough pain only for short time after surgery.  We reached out to her Pain management doctor, Dr. Jaye Xiao, and recommended that she just use her 4mg dilaudid  that she already has prescribed for breakthrough pain as needed and no other medications necessary.  Dr. Xiao said that available for any pain control issues or questions post-operatively.  Discussed with patient home preparation recommendations including use of shower chair and shower stool, use of cast cover to keep dry, try to get everything on 1 floor so that she does not have to negotiate stairs, assistance with respect to taking care of her mom and assistance with respect to helping her get around in the initial postoperative period with transportation.  She has family that can help her.  Discussed with her risks of surgery including pain, bleeding, infection, wound healing problems, soft tissue healing problems, bone healing problems, hardware related complications, injury nerves or vessels, DVT, PE and other medical complications.   She would like to proceed with surgery.  Patient will increase their dietary calcium intake (milk,yogurt) and should get 1500 mg of calcium per day. They will also take a vitamin D supplement. All the patient's questions were answered. The patient agrees with the above plan.      I discussed with patient the procedure in detail, risks and benefits of procedure, post-op protocol, anesthetic used with possible regional block, timeline of recovery, need for protective weightbearing and/or bracing after procedure, pain management protocol, DVT prophylaxis protocol, home preparation suggestions, pre-op surgery preparation, and other pertinent information.

## 2024-11-05 NOTE — PATIENT INSTRUCTIONS
We have discussed your treatment options, risks and benefits and surgery for your condition was selected and recommended.  You may have to see your primary care doctor or the [facility] to be medically cleared (healthy enough to safely undergo surgery without further treatment for your medical conditions) prior to your surgery.  Please do not eat, drink, or chew anything after midnight the night before your surgery.  Please call Aishwarya Winn at [phone] to schedule your surgery and if you have any further questions.    YOUR BOOT INSTRUCTIONS:  You can put weight on your foot and ankle while in the boot.    You can use crutches or walker for support as needed.   You should wear boot when walking or standing for 3 weeks.  You can take off your boot while bathing, sitting, stretching, icing or doing therapy exercises.  You can take your boot off at nighttime while sleeping.    Follow up in surgery

## 2024-11-06 PROBLEM — S92.352A CLOSED DISPLACED FRACTURE OF FIFTH METATARSAL BONE OF LEFT FOOT: Status: ACTIVE | Noted: 2024-11-05

## 2024-11-12 RX ORDER — CEFAZOLIN SODIUM 2 G/100ML
2 INJECTION, SOLUTION INTRAVENOUS ONCE
Status: CANCELLED | OUTPATIENT
Start: 2024-11-12 | End: 2024-11-12

## 2024-11-13 ENCOUNTER — OFFICE VISIT (OUTPATIENT)
Dept: PAIN MEDICINE | Facility: CLINIC | Age: 54
End: 2024-11-13
Payer: MEDICARE

## 2024-11-13 ENCOUNTER — TELEPHONE (OUTPATIENT)
Dept: PRIMARY CARE | Facility: CLINIC | Age: 54
End: 2024-11-13

## 2024-11-13 VITALS — SYSTOLIC BLOOD PRESSURE: 146 MMHG | TEMPERATURE: 97.5 F | HEART RATE: 90 BPM | DIASTOLIC BLOOD PRESSURE: 76 MMHG

## 2024-11-13 DIAGNOSIS — M54.42 CHRONIC MIDLINE LOW BACK PAIN WITH LEFT-SIDED SCIATICA: ICD-10-CM

## 2024-11-13 DIAGNOSIS — G89.29 CHRONIC MIDLINE LOW BACK PAIN WITH LEFT-SIDED SCIATICA: ICD-10-CM

## 2024-11-13 PROCEDURE — 99213 OFFICE O/P EST LOW 20 MIN: CPT

## 2024-11-13 RX ORDER — HYDROMORPHONE HYDROCHLORIDE 4 MG/1
4 TABLET ORAL 2 TIMES DAILY PRN
Qty: 60 TABLET | Refills: 0 | Status: SHIPPED | OUTPATIENT
Start: 2024-12-04 | End: 2025-01-03

## 2024-11-13 RX ORDER — HYDROMORPHONE HYDROCHLORIDE 4 MG/1
4 TABLET ORAL 2 TIMES DAILY PRN
Qty: 60 TABLET | Refills: 0 | Status: SHIPPED | OUTPATIENT
Start: 2025-01-03 | End: 2025-02-02

## 2024-11-13 RX ORDER — HYDROMORPHONE HYDROCHLORIDE 4 MG/1
4 TABLET ORAL 2 TIMES DAILY PRN
Qty: 60 TABLET | Refills: 0 | Status: SHIPPED | OUTPATIENT
Start: 2025-02-02 | End: 2025-03-04

## 2024-11-13 ASSESSMENT — COLUMBIA-SUICIDE SEVERITY RATING SCALE - C-SSRS
1. IN THE PAST MONTH, HAVE YOU WISHED YOU WERE DEAD OR WISHED YOU COULD GO TO SLEEP AND NOT WAKE UP?: NO
6. HAVE YOU EVER DONE ANYTHING, STARTED TO DO ANYTHING, OR PREPARED TO DO ANYTHING TO END YOUR LIFE?: NO
2. HAVE YOU ACTUALLY HAD ANY THOUGHTS OF KILLING YOURSELF?: NO

## 2024-11-13 ASSESSMENT — PAIN DESCRIPTION - DESCRIPTORS: DESCRIPTORS: THROBBING

## 2024-11-13 ASSESSMENT — PAIN SCALES - GENERAL: PAINLEVEL_OUTOF10: 6

## 2024-11-13 ASSESSMENT — PAIN - FUNCTIONAL ASSESSMENT: PAIN_FUNCTIONAL_ASSESSMENT: 0-10

## 2024-11-13 NOTE — PROGRESS NOTES
Patient here for med refill, states her low back pain is 6/10 and recently broke her left foot and is awaiting surgery tomorrow, her foot pain is currently 8/10

## 2024-11-13 NOTE — TELEPHONE ENCOUNTER
REFILL  MEDICATION:     Levothyroxine 25 MCG; Take 1 tablet early in the morning. Take on an empty stomach at the same time each day, either 30 to 60 minutes prior to breakfast.     PHARM: [facility] [place]   PHARM NUMBER: [phone]    LR: 7/14/24     30 tablets with 3 refills   LV: 9/24/24  NV: 1/3/25

## 2024-11-13 NOTE — PROGRESS NOTES
Subjective   Patient ID: Samina Celeste is a 54 y.o. female who presents for Med Refill (Patient here for med refill, states her low back pain is 6/10 and recently broke her left foot and is awaiting surgery tomorrow, her foot pain is currently 8/10).  HPI    55 yo female presents today for medication refills. She is known to this clinic for chronic low back pain and has a spinal cord stimulator and intrathecal pump. She is maintained on hydromorphone 4mg twice daily as needed for breakthrough pain. She is reporting 75% pain reduction from the hydromorphone. Pain today is rated 6/10 in the low back. She is having surgery on her right foot tomorrow, ORIF of right 5th metatarsal with Dr. Cabrales. The patient denies any side effects associated with the usage of the medication patient described the medication improving the quality of life and allowing participation in day-to-day activity patient denies otherwise any new complaint patient is requesting a refill. Patient denies usage of any other opiate. Patient denies usage of any recreational drugs. Patient confirms that the opiate prescription is being used as prescribed.           Review of Systems  All 13 systems were reviewed and are within normal levels except as noted below or per HPI. Positive and pertinent negative responses are noted below or in the HPI   Denied any fever or chills. No weight loss and no night sweats. No cough or sputum production. No diarrhea   Denies constipation.   No bladder and bowel incontinence and no other changes in bladder and bowel. No skin changes. Reports tiredness and fatigability only if the pain is not controlled.   Denied opioids diversion and abuse and denies alcoholism. Denies overuse of the pain medications.      Objective   Physical Exam  General   Alert and oriented x4, pleasant and cooperative.      HEENT  Pupils are equal and normal in size. Ears, nose, mouth, and throat appear to be WNL.  Head atraumatic,  symmetric.      No signs of sedation or signs of withdrawal apparent.     Psychiatric   No signs of depression apparent. Appropriate mood and affect.     Neuro   No focal neurological deficit apparent. Ambulation at baseline.      Respiratory  No respiratory distress, respirations equal and unlabored.     Abdomen  Soft and nontender, no distention noted.     Skin  Warm, dry and intact. No skin markings supportive of recent IV drug usage .            Assessment/Plan       53 yo female with history and physical exam supportive of chronic low back pain associated with lumbar postlaminectomy syndrome.     [facility]. I have considered the risks of abuse, dependence, addiction and diversion. I believe that it is clinically appropriate for this patient to be prescribed this medication based on documented diagnosis.  I believe the benefits of the continuation of the opiate outweighs the risk. Patient has described positive response to the present medication therapy. Patient denies any side effects associated with the usage of the medication. Patient did not elicit any signs supportive of misuse or abuse. The review of the [place] [facility] prescription is not suggestive of any worrisome pattern. Patient believes the usage of this medication has improved the quality of life and allow him to participate in day-to-day activity. Therefore  I recommend the continuation of this medication and I will be refilling the medication prescription.    The patient was counseled regarding diagnostic results, instructions for management, risk factor reductions, prognosis, patient and family education, impressions, risks and benefits of treatment options and importance of compliance with treatment.          Continue to take hydromorphone as prescribed for breakthrough pain.   Pump refill on 01/08/2025    Follow up in 3 months or as needed for medications.   Explained plan to this  patient, and patient verbalized understanding and agreement with the plan.     Please do not hesitate to contact the pain clinic after your visit with any questions or concerns at  M-F 8-4 pm     Patient was reminded not to share medications, not to take prescription medications that were not prescribed to the patient, and not to increase or change dose without consulting the pain clinic. I advised the patient to always take the least amount of medication needed to keep symptoms under control.       Nikole Grimes, DENNISE-CNP 11/13/24 10:55 AM

## 2024-11-14 ENCOUNTER — ANESTHESIA EVENT (OUTPATIENT)
Dept: OPERATING ROOM | Facility: CLINIC | Age: 54
End: 2024-11-14
Payer: MEDICARE

## 2024-11-14 ENCOUNTER — HOSPITAL ENCOUNTER (OUTPATIENT)
Facility: CLINIC | Age: 54
Setting detail: OUTPATIENT SURGERY
Discharge: HOME | End: 2024-11-14
Attending: ORTHOPAEDIC SURGERY | Admitting: ORTHOPAEDIC SURGERY
Payer: MEDICARE

## 2024-11-14 ENCOUNTER — HOSPITAL ENCOUNTER (OUTPATIENT)
Dept: RADIOLOGY | Facility: CLINIC | Age: 54
Setting detail: OUTPATIENT SURGERY
Discharge: HOME | End: 2024-11-14
Payer: MEDICARE

## 2024-11-14 ENCOUNTER — PHARMACY VISIT (OUTPATIENT)
Dept: PHARMACY | Facility: CLINIC | Age: 54
End: 2024-11-14
Payer: MEDICARE

## 2024-11-14 ENCOUNTER — ANESTHESIA (OUTPATIENT)
Dept: OPERATING ROOM | Facility: CLINIC | Age: 54
End: 2024-11-14
Payer: MEDICARE

## 2024-11-14 VITALS
TEMPERATURE: 97.9 F | HEIGHT: 63 IN | DIASTOLIC BLOOD PRESSURE: 84 MMHG | HEART RATE: 74 BPM | WEIGHT: 198.63 LBS | RESPIRATION RATE: 16 BRPM | SYSTOLIC BLOOD PRESSURE: 157 MMHG | BODY MASS INDEX: 35.2 KG/M2 | OXYGEN SATURATION: 96 %

## 2024-11-14 DIAGNOSIS — E03.9 HYPOTHYROIDISM, UNSPECIFIED TYPE: ICD-10-CM

## 2024-11-14 DIAGNOSIS — S92.352A CLOSED DISPLACED FRACTURE OF FIFTH METATARSAL BONE OF LEFT FOOT, INITIAL ENCOUNTER: Primary | ICD-10-CM

## 2024-11-14 DIAGNOSIS — S92.352S CLOSED DISPLACED FRACTURE OF FIFTH METATARSAL BONE OF LEFT FOOT, SEQUELA: ICD-10-CM

## 2024-11-14 PROCEDURE — 3700000002 HC GENERAL ANESTHESIA TIME - EACH INCREMENTAL 1 MINUTE: Performed by: ORTHOPAEDIC SURGERY

## 2024-11-14 PROCEDURE — 7100000010 HC PHASE TWO TIME - EACH INCREMENTAL 1 MINUTE: Performed by: ORTHOPAEDIC SURGERY

## 2024-11-14 PROCEDURE — 2500000005 HC RX 250 GENERAL PHARMACY W/O HCPCS: Performed by: ORTHOPAEDIC SURGERY

## 2024-11-14 PROCEDURE — 2500000004 HC RX 250 GENERAL PHARMACY W/ HCPCS (ALT 636 FOR OP/ED): Performed by: NURSE ANESTHETIST, CERTIFIED REGISTERED

## 2024-11-14 PROCEDURE — 3700000001 HC GENERAL ANESTHESIA TIME - INITIAL BASE CHARGE: Performed by: ORTHOPAEDIC SURGERY

## 2024-11-14 PROCEDURE — C1762 CONN TISS, HUMAN(INC FASCIA): HCPCS | Performed by: ORTHOPAEDIC SURGERY

## 2024-11-14 PROCEDURE — 2780000003 HC OR 278 NO HCPCS: Performed by: ORTHOPAEDIC SURGERY

## 2024-11-14 PROCEDURE — 7100000001 HC RECOVERY ROOM TIME - INITIAL BASE CHARGE: Performed by: ORTHOPAEDIC SURGERY

## 2024-11-14 PROCEDURE — 2500000001 HC RX 250 WO HCPCS SELF ADMINISTERED DRUGS (ALT 637 FOR MEDICARE OP): Performed by: ANESTHESIOLOGY

## 2024-11-14 PROCEDURE — 3600000004 HC OR TIME - INITIAL BASE CHARGE - PROCEDURE LEVEL FOUR: Performed by: ORTHOPAEDIC SURGERY

## 2024-11-14 PROCEDURE — 7100000009 HC PHASE TWO TIME - INITIAL BASE CHARGE: Performed by: ORTHOPAEDIC SURGERY

## 2024-11-14 PROCEDURE — C1713 ANCHOR/SCREW BN/BN,TIS/BN: HCPCS | Performed by: ORTHOPAEDIC SURGERY

## 2024-11-14 PROCEDURE — 2500000004 HC RX 250 GENERAL PHARMACY W/ HCPCS (ALT 636 FOR OP/ED): Performed by: ANESTHESIOLOGY

## 2024-11-14 PROCEDURE — 76000 FLUOROSCOPY <1 HR PHYS/QHP: CPT

## 2024-11-14 PROCEDURE — 3600000009 HC OR TIME - EACH INCREMENTAL 1 MINUTE - PROCEDURE LEVEL FOUR: Performed by: ORTHOPAEDIC SURGERY

## 2024-11-14 PROCEDURE — RXMED WILLOW AMBULATORY MEDICATION CHARGE

## 2024-11-14 PROCEDURE — 7100000002 HC RECOVERY ROOM TIME - EACH INCREMENTAL 1 MINUTE: Performed by: ORTHOPAEDIC SURGERY

## 2024-11-14 PROCEDURE — 2720000007 HC OR 272 NO HCPCS: Performed by: ORTHOPAEDIC SURGERY

## 2024-11-14 PROCEDURE — C1769 GUIDE WIRE: HCPCS | Performed by: ORTHOPAEDIC SURGERY

## 2024-11-14 PROCEDURE — 28485 OPTX METATARSAL FX EACH: CPT | Performed by: ORTHOPAEDIC SURGERY

## 2024-11-14 DEVICE — ALLOSYNC DBM PUTTY, 2.5CC VIAL
Type: IMPLANTABLE DEVICE | Site: FOOT | Status: FUNCTIONAL
Brand: ARTHREX

## 2024-11-14 DEVICE — IMPLANTABLE DEVICE: Type: IMPLANTABLE DEVICE | Site: FOOT | Status: FUNCTIONAL

## 2024-11-14 DEVICE — IMPLANTABLE DEVICE: Type: IMPLANTABLE DEVICE | Site: FOOT | Status: NON-FUNCTIONAL

## 2024-11-14 RX ORDER — LIDOCAINE HYDROCHLORIDE 20 MG/ML
INJECTION, SOLUTION INFILTRATION; PERINEURAL AS NEEDED
Status: DISCONTINUED | OUTPATIENT
Start: 2024-11-14 | End: 2024-11-14

## 2024-11-14 RX ORDER — SODIUM CHLORIDE, SODIUM LACTATE, POTASSIUM CHLORIDE, CALCIUM CHLORIDE 600; 310; 30; 20 MG/100ML; MG/100ML; MG/100ML; MG/100ML
100 INJECTION, SOLUTION INTRAVENOUS CONTINUOUS
Status: DISCONTINUED | OUTPATIENT
Start: 2024-11-14 | End: 2024-11-14 | Stop reason: HOSPADM

## 2024-11-14 RX ORDER — ONDANSETRON HYDROCHLORIDE 2 MG/ML
4 INJECTION, SOLUTION INTRAVENOUS ONCE AS NEEDED
Status: DISCONTINUED | OUTPATIENT
Start: 2024-11-14 | End: 2024-11-14 | Stop reason: HOSPADM

## 2024-11-14 RX ORDER — LABETALOL HYDROCHLORIDE 5 MG/ML
5 INJECTION, SOLUTION INTRAVENOUS ONCE AS NEEDED
Status: DISCONTINUED | OUTPATIENT
Start: 2024-11-14 | End: 2024-11-14 | Stop reason: HOSPADM

## 2024-11-14 RX ORDER — MIDAZOLAM HYDROCHLORIDE 1 MG/ML
INJECTION, SOLUTION INTRAMUSCULAR; INTRAVENOUS AS NEEDED
Status: DISCONTINUED | OUTPATIENT
Start: 2024-11-14 | End: 2024-11-14

## 2024-11-14 RX ORDER — DOCUSATE SODIUM 100 MG/1
100 CAPSULE, LIQUID FILLED ORAL 2 TIMES DAILY
Qty: 20 CAPSULE | Refills: 0 | Status: SHIPPED | OUTPATIENT
Start: 2024-11-14

## 2024-11-14 RX ORDER — CEFAZOLIN SODIUM 2 G/50ML
2 SOLUTION INTRAVENOUS ONCE
Status: DISCONTINUED | OUTPATIENT
Start: 2024-11-14 | End: 2024-11-14 | Stop reason: HOSPADM

## 2024-11-14 RX ORDER — SODIUM CHLORIDE 0.9 G/100ML
IRRIGANT IRRIGATION AS NEEDED
Status: DISCONTINUED | OUTPATIENT
Start: 2024-11-14 | End: 2024-11-14 | Stop reason: HOSPADM

## 2024-11-14 RX ORDER — OXYCODONE HYDROCHLORIDE 5 MG/1
5 TABLET ORAL EVERY 4 HOURS PRN
Status: DISCONTINUED | OUTPATIENT
Start: 2024-11-14 | End: 2024-11-14 | Stop reason: HOSPADM

## 2024-11-14 RX ORDER — SODIUM CHLORIDE, SODIUM LACTATE, POTASSIUM CHLORIDE, CALCIUM CHLORIDE 600; 310; 30; 20 MG/100ML; MG/100ML; MG/100ML; MG/100ML
INJECTION, SOLUTION INTRAVENOUS CONTINUOUS PRN
Status: DISCONTINUED | OUTPATIENT
Start: 2024-11-14 | End: 2024-11-14

## 2024-11-14 RX ORDER — ASPIRIN 325 MG
325 TABLET, DELAYED RELEASE (ENTERIC COATED) ORAL DAILY
Qty: 21 TABLET | Refills: 0 | Status: SHIPPED | OUTPATIENT
Start: 2024-11-15

## 2024-11-14 RX ORDER — CELECOXIB 200 MG/1
CAPSULE ORAL AS NEEDED
Status: DISCONTINUED | OUTPATIENT
Start: 2024-11-14 | End: 2024-11-14

## 2024-11-14 RX ORDER — LIDOCAINE HYDROCHLORIDE 10 MG/ML
0.1 INJECTION, SOLUTION EPIDURAL; INFILTRATION; INTRACAUDAL; PERINEURAL ONCE
Status: DISCONTINUED | OUTPATIENT
Start: 2024-11-14 | End: 2024-11-14 | Stop reason: HOSPADM

## 2024-11-14 RX ORDER — ACETAMINOPHEN 325 MG/1
TABLET ORAL AS NEEDED
Status: DISCONTINUED | OUTPATIENT
Start: 2024-11-14 | End: 2024-11-14

## 2024-11-14 RX ORDER — GABAPENTIN 300 MG/1
CAPSULE ORAL AS NEEDED
Status: DISCONTINUED | OUTPATIENT
Start: 2024-11-14 | End: 2024-11-14

## 2024-11-14 RX ORDER — ONDANSETRON 4 MG/1
4 TABLET, FILM COATED ORAL EVERY 8 HOURS PRN
Qty: 20 TABLET | Refills: 0 | Status: SHIPPED | OUTPATIENT
Start: 2024-11-14

## 2024-11-14 RX ORDER — LEVOTHYROXINE SODIUM 25 UG/1
25 TABLET ORAL DAILY
Qty: 30 TABLET | Refills: 3 | Status: SHIPPED | OUTPATIENT
Start: 2024-11-14 | End: 2025-03-14

## 2024-11-14 RX ORDER — HYDROMORPHONE HYDROCHLORIDE 1 MG/ML
0.5 INJECTION, SOLUTION INTRAMUSCULAR; INTRAVENOUS; SUBCUTANEOUS EVERY 5 MIN PRN
Status: DISCONTINUED | OUTPATIENT
Start: 2024-11-14 | End: 2024-11-14 | Stop reason: HOSPADM

## 2024-11-14 RX ORDER — HYDROMORPHONE HYDROCHLORIDE 1 MG/ML
0.2 INJECTION, SOLUTION INTRAMUSCULAR; INTRAVENOUS; SUBCUTANEOUS EVERY 5 MIN PRN
Status: DISCONTINUED | OUTPATIENT
Start: 2024-11-14 | End: 2024-11-14 | Stop reason: HOSPADM

## 2024-11-14 RX ORDER — HYDROMORPHONE HYDROCHLORIDE 1 MG/ML
INJECTION, SOLUTION INTRAMUSCULAR; INTRAVENOUS; SUBCUTANEOUS AS NEEDED
Status: DISCONTINUED | OUTPATIENT
Start: 2024-11-14 | End: 2024-11-14

## 2024-11-14 RX ORDER — ALBUTEROL SULFATE 0.83 MG/ML
2.5 SOLUTION RESPIRATORY (INHALATION) ONCE AS NEEDED
Status: DISCONTINUED | OUTPATIENT
Start: 2024-11-14 | End: 2024-11-14 | Stop reason: HOSPADM

## 2024-11-14 RX ORDER — ONDANSETRON HYDROCHLORIDE 2 MG/ML
INJECTION, SOLUTION INTRAVENOUS AS NEEDED
Status: DISCONTINUED | OUTPATIENT
Start: 2024-11-14 | End: 2024-11-14

## 2024-11-14 RX ORDER — FENTANYL CITRATE 50 UG/ML
INJECTION, SOLUTION INTRAMUSCULAR; INTRAVENOUS AS NEEDED
Status: DISCONTINUED | OUTPATIENT
Start: 2024-11-14 | End: 2024-11-14

## 2024-11-14 RX ORDER — CEFAZOLIN 1 G/1
INJECTION, POWDER, FOR SOLUTION INTRAVENOUS AS NEEDED
Status: DISCONTINUED | OUTPATIENT
Start: 2024-11-14 | End: 2024-11-14

## 2024-11-14 RX ORDER — PROPOFOL 10 MG/ML
INJECTION, EMULSION INTRAVENOUS AS NEEDED
Status: DISCONTINUED | OUTPATIENT
Start: 2024-11-14 | End: 2024-11-14

## 2024-11-14 ASSESSMENT — PAIN - FUNCTIONAL ASSESSMENT
PAIN_FUNCTIONAL_ASSESSMENT: 0-10

## 2024-11-14 ASSESSMENT — PAIN SCALES - GENERAL
PAINLEVEL_OUTOF10: 0 - NO PAIN
PAINLEVEL_OUTOF10: 7
PAINLEVEL_OUTOF10: 0 - NO PAIN
PAIN_LEVEL: 0
PAINLEVEL_OUTOF10: 0 - NO PAIN

## 2024-11-14 ASSESSMENT — COLUMBIA-SUICIDE SEVERITY RATING SCALE - C-SSRS
6. HAVE YOU EVER DONE ANYTHING, STARTED TO DO ANYTHING, OR PREPARED TO DO ANYTHING TO END YOUR LIFE?: NO
1. IN THE PAST MONTH, HAVE YOU WISHED YOU WERE DEAD OR WISHED YOU COULD GO TO SLEEP AND NOT WAKE UP?: NO
2. HAVE YOU ACTUALLY HAD ANY THOUGHTS OF KILLING YOURSELF?: NO

## 2024-11-14 NOTE — ANESTHESIA POSTPROCEDURE EVALUATION
Patient: Samina Celeste    Procedure Summary       Date: 11/14/24 Room / Location: [facility] OR 02 [facility]    Anesthesia Start: 1106 Anesthesia Stop: 1323    Procedure: 5th metatarsal fracture Open Reduction Internal Fixation (Left: Foot) Diagnosis:       Closed displaced fracture of fifth metatarsal bone of left foot, initial encounter      (Closed displaced fracture of fifth metatarsal bone of left foot, initial encounter [S92.352A])    Surgeons: Sravani Cabrales MD Responsible Provider: Monster Alegria DO    Anesthesia Type: general, regional ASA Status: 3            Anesthesia Type: general, regional    Vitals Value Taken Time   /67 11/14/24 1322   Temp 36 °C (96.8 °F) 11/14/24 1322   Pulse 69 11/14/24 1322   Resp 16 11/14/24 1322   SpO2 96 % 11/14/24 1322       Anesthesia Post Evaluation    Patient location during evaluation: PACU  Patient participation: waiting for patient participation  Level of consciousness: sedated  Pain score: 0  Pain management: adequate  Airway patency: patent  Cardiovascular status: acceptable  Respiratory status: acceptable  Hydration status: acceptable  Postoperative Nausea and Vomiting: none        There were no known notable events for this encounter.

## 2024-11-14 NOTE — H&P
History Of Present Illness  Samina Celeste is a 54 y.o. female presenting for left foot 5th metatarsal fracture ORIF. Patient reports she is doing well. No complaints. No hx of DVT, nonsmoker.      Past Medical History  Past Medical History:   Diagnosis Date    Anxiety     Connective tissue disorder (Multi)     Depression     GERD (gastroesophageal reflux disease)     Hypertension     Hypothyroidism     Other conditions influencing health status     Visit For: Routine Eye Exam    Pain of intrathecal infusion pump pocket after insertion     Person consulting for explanation of examination or test findings     Visit for review of DEXA scan    Personal history of diseases of the skin and subcutaneous tissue     History of urticaria due to cold    Personal history of other diseases of the nervous system and sense organs     History of migraine headaches    Personal history of other medical treatment     History of mammogram    Presence of other specified functional implants     Spinal cord stimulator status    Spinal cord stimulator status        Surgical History  Past Surgical History:   Procedure Laterality Date    INTRATHECAL PUMP IMPLANTATION      LUMBAR FUSION  04/08/2014    Lumbar Vertebral Fusion    OTHER SURGICAL HISTORY  08/16/2017    Neurological Surgery Laminectomy    OTHER SURGICAL HISTORY  06/11/2019    Back surgery    SPINAL CORD STIMULATOR IMPLANT      TONSILLECTOMY  03/26/2014    Tonsillectomy        Social History  She reports that she has quit smoking. Her smoking use included cigarettes. She started smoking about 24 years ago. She has a 36.1 pack-year smoking history. She has never used smokeless tobacco. She reports current alcohol use. She reports that she does not currently use drugs.    Family History  Family History   Problem Relation Name Age of Onset    Hypertension Mother      Allergies Mother      Rheum arthritis Mother      Diabetes Father      Coronary artery disease Father       "Emphysema Father      Breast cancer Maternal Grandmother      Diabetes Maternal Grandfather      Lung cancer Paternal Grandmother          Allergies  Lactose and Tramadol    Review of Systems   Musculoskeletal:  Positive for gait problem.   All other systems reviewed and are negative.       Physical Exam  Constitutional:       Appearance: Normal appearance. She is normal weight.   HENT:      Head: Normocephalic.   Eyes:      Extraocular Movements: Extraocular movements intact.   Pulmonary:      Effort: Pulmonary effort is normal.   Musculoskeletal:         General: Normal range of motion.      Cervical back: Normal range of motion.   Neurological:      Mental Status: She is alert and oriented to person, place, and time.   Psychiatric:         Mood and Affect: Mood normal.         Behavior: Behavior normal.         Thought Content: Thought content normal.         Judgment: Judgment normal.          Last Recorded Vitals  Height 1.6 m (5' 3\"), weight 90.1 kg (198 lb 10.2 oz).    Relevant Results             Assessment/Plan   Assessment & Plan  Closed displaced fracture of fifth metatarsal bone of left foot    Hypothyroidism             I spent 11 minutes in the professional and overall care of this patient.      Johann Salinas PA-C    "

## 2024-11-14 NOTE — ANESTHESIA PROCEDURE NOTES
Peripheral Block    Patient location during procedure: pre-op  Start time: 11/14/2024 9:27 AM  End time: 11/14/2024 9:40 AM  Reason for block: at surgeon's request and post-op pain management  Staffing  Performed: attending   Authorized by: Monster Alegria DO    Performed by: Monster Alegria DO  Preanesthetic Checklist  Completed: patient identified, IV checked, site marked, risks and benefits discussed, surgical consent, monitors and equipment checked, pre-op evaluation and timeout performed   Timeout performed at: 11/14/2024 9:26 AM  Peripheral Block  Patient position: laying flat  Prep: ChloraPrep  Patient monitoring: heart rate and continuous pulse ox  Block type: popliteal  Laterality: left  Injection technique: single-shot  Guidance: ultrasound guided  Local infiltration: ropivacaine  Infiltration strength: 0.5 %  Dose: 25 mL  Needle  Needle type: pencil-tip   Needle gauge: 21 G  Needle length: 8 cm  Needle localization: ultrasound guidance     image stored in chart  Assessment  Injection assessment: local visualized surrounding nerve on ultrasound, incremental injection, no paresthesia on injection and negative aspiration for heme  Paresthesia pain: none  Heart rate change: no  Slow fractionated injection: yes  Additional Notes  Single shot left sided popliteal nerve block performed under direct ultrasound guidance.  Site cleaned with chloraprep x 2.  Procedure done under strict sterile technique.  Skin localized with 1% Lidocaine.  Appropriate structures identified with ultrasound imaging.   21G Pecan needle inserted under US visualization.  0.5% Ropivacaine with epi injected under direct ultrasound guidance.  25ml of 0.5% Ropivacaine injected  under direct ultrasound guidance.  Serial aspirations every 5ml.  Aspirations negative for heme.  Negative paresthesias.  Patient tolerated procedure well without immediate complications.

## 2024-11-14 NOTE — DISCHARGE INSTRUCTIONS
Please remain nonweight bearing on your splint/brace. DO NOT place any weight on your splint/brace. It is important to keep your cast/splint elevated supported at the calf with NO pressure on the heel to reduce swelling.    Please take your medications as instructed    You can call [phone] to schedule your follow up appointment for around 10-14 days after surgery    If you have any concerns or questions; please call our office at [phone]. If unable to reach your surgeon after hours call [phone] and page the Orthopedic doctor on call    Tylenol given at 915 am, no more until 315 pm  Celebrex given at 915 am today, no ibuprofen, motrin, advil, naproxen, aleve or other NSAIDs until tomorrow at 915 am (Friday November 15)  Patient Discharge Instructions for a Peripheral Nerve Block of the Leg     You have received a nerve block in your    It may last up to        hours.     When to notify the on-call anesthesiologist:    If you have any questions or problems regarding your nerve block.    If you get a headache while sitting or standing. This may be a rare side effect of spinal or epidural anesthesia.    Go to the nearest emergency room or call 911 if you have chest pain and/ or shortness of breath that is unrelieved by sitting up. This may be a serious emergency.    If the block does not wear off in 48 hours.     Activity:    Your leg and foot will be numb and weak after surgery.    You will need to use crutches when you walk as your leg may give out.    Do not put any weight on your surgical leg for 24 hours. After 24 hours, follow the instructions given to you by your surgeon.    Avoid putting your leg on or near objects that may be very hot or cold. Your ability to feel hot and cold will be decreased until the numbing medicine wears off.     Pain Medicine:    The numbing effect of the nerve block can last from 18 to 30+ hours. Take one dose of your pain medicine the night of surgery before going to  sleep, or earlier if you feel the numbing medicine beginning to wear off.    Take your pain medicine as needed during the day and night afterwards as instructed.     Additional Instructions:    Have a responsible adult remain with you to assist you at home after surgery. Remember that you will not be able to walk without crutches or support. Work with your caregiver to learn transfer techniques.    Rest your leg elevated on pillows when possible. You may use cold packs to lessen pain and swelling. Put crushed ice in a plastic bag and wrap the bag with a towel. Place this on your incision for 10-15 minutes out of each hour. Do not sleep on the ice bag because this could cause frost bite.    Use caution when going up and down stairs.    Do not drive until you check with your surgeon.

## 2024-11-14 NOTE — ANESTHESIA PREPROCEDURE EVALUATION
Patient: Samina Celeste    Procedure Information       Date/Time: 11/14/24 0935    Procedure: 5th metatarsal fracture Open Reduction Internal Fixation (Left: Foot)    [facility] / [facility]    Surgeons: Sravani Cabrales MD            Relevant Problems   Anesthesia (within normal limits)      Cardiac  4 mets   (+) Hypertension      Pulmonary (within normal limits)      Neuro   (+) Anxiety   (+) Depression   (+) Lumbar radiculopathy      GI   (+) Gastroesophageal reflux disease (Well controlled)      /Renal   (+) Hyponatremia      Endocrine   (+) Drug-induced obesity   (+) Hypothyroidism   (+) Obesity, morbid (Multi)      Hematology   (+) Anemia      Musculoskeletal   (+) Displacement of lumbar intervertebral disc without myelopathy   (+) Herniated lumbar intervertebral disc   (+) Lumbar spondylosis   (+) Lumbar stenosis   (+) Scoliosis, or kyphoscoliosis, idiopathic   (+) Spinal stenosis       Clinical information reviewed:   Tobacco  Allergies  Meds   Med Hx  Surg Hx   Fam Hx  Soc Hx        NPO Detail:  NPO/Void Status  Date of Last Liquid: 11/14/24  Time of Last Liquid: 0715 (sip of water with morning med)  Date of Last Solid: 11/13/24  Time of Last Solid: 2300         Physical Exam    Airway  Mallampati: III  TM distance: >3 FB  Neck ROM: full     Cardiovascular    Dental - normal exam     Pulmonary    Abdominal        Anesthesia Plan    History of general anesthesia?: yes  History of complications of general anesthesia?: no    ASA 3     general and regional     intravenous induction   Anesthetic plan and risks discussed with patient and spouse.    Plan discussed with CRNA.

## 2024-11-14 NOTE — OP NOTE
5th metatarsal fracture Open Reduction Internal Fixation (L) Operative Note     Date: 2024  OR Location: [facility] OR    Name: Samina Celeste YOB: 1970, Age: 54 y.o., MRN: [id number], Sex: female    Diagnosis  Pre-op Diagnosis      * Closed displaced fracture of fifth metatarsal bone of left foot, initial encounter [S92.352A] Post-op Diagnosis     * Closed displaced fracture of fifth metatarsal bone of left foot, initial encounter [S92.352A]     Procedures  5th metatarsal fracture Open Reduction Internal Fixation of nonunited fracture ([place] 28 L-plate, 2.0mm locking, lag and cortical screws)  [address] - [place] OPEN TREATMENT METATARSAL FRACTURE EACH      Surgeons      * Sravani Cabrales - Primary    Resident/Fellow/Other Assistant:  Surgeons and Role:     * Deon Carlos MD - Resident - Assisting    Staff:   Circulator: Reba Townsend Person: Evelyn Craft Scrub: Debbie Craft Circulator: Lukasz    Anesthesia Staff: Anesthesiologist: Monster Alegria DO  CRNA: DENNISE Flores-CRNA    Procedure Summary  Anesthesia: Regional, General  ASA: III  Estimated Blood Loss: less 5mL  Intra-op Medications:   Administrations occurring from 0935 to 1140 on 24:   Medication Name Total Dose   ceFAZolin (Ancef) vial 1 g 2 g   fentaNYL PF 0.05 mg/mL 100 mcg   lidocaine (Xylocaine) injection 2 % 30 mg   propofol (Diprivan) injection 10 mg/mL 312.63 mg              Anesthesia Record               Intraprocedure I/O Totals          Intake    LR infusion 700.00 mL    Total Intake 700 mL          Specimen: No specimens collected              Drains and/or Catheters: * None in log *    Tourniquet Times:     Total Tourniquet Time Documented:  Thigh (Left) - 88 minutes  Total: Thigh (Left) - 88 minutes      Implants:  Implants       Type Name Action Serial No.      Graft PUTTY, ALLOSYNC DBM, 2.5CC - [id number] Implanted      Screw L-PLATE, 6 HOLE, COMPRESSION L2, RIGHT - [id number] Implanted      Screw  SCREW, BG LOCKING, 2.0 X 10MM - [id number] Implanted      Screw SCREW, BG LOCKING, 2.0 X 11MM - [id number] Implanted      Screw SCREW, BG LOCKING, 2.0 X 11MM - [id number] Used, Not Implanted      Screw SCREW, BG LOCKING, 2.0 X 13MM - [id number] Implanted      Screw SCREW, BG NON LOCKING, 2.0 X 10MM - [id number] Implanted      Screw SCREW, BG NON LOCKING, 2.0 X 14MM - [id number] Implanted      Screw K-WIRE, SINGLE END TROCAR TIP, SMOOTH, 1.2 X 100MM - [id number] Used, Not Implanted      Screw OLIVE WIRE, SMOOTH, 1.3MM - [id number] Used, Not Implanted           Indications: Samina Celeste is an 54 y.o. female who is having surgery for Closed displaced fracture of fifth metatarsal bone of left foot, initial encounter [S92.352A].  After discussing the alternatives of treatment in detail with the patient, the patient selected surgical intervention and/or reconstruction.  We discussed with the patient risks and benefits of the procedure(s).  Risks of surgery were reviewed including pain, bleeding, infection, wound healing problems, soft tissue or bone healing problems, injury to nerves or vessels, implant or hardware related complications, chronic extremity stiffness or pain or swelling, post-traumatic arthritis, recurrent symptoms, DVT, PE and other medical complications.  After the patient's questions were answered in detail including post-operative course requiring nonweightbearing and the extensive rehabilitation needed after surgery, the patient then gave informed consent for the procedure.    DESCRIPTION OF PROCEDURE  The patient was identified in the pre-operative area and the surgical extremity was marked with a skin marker to designate surgical site.  Anesthesia consult placed popliteal block without complication.  Patient then was brought back to the operating room.  A huddle was called and confirmed upon entry into the operating room as per protocol.  Time out was called and confirmed prior to skin incision.   General anesthetic was administered and LMA placed without complication.  Patient received IV ancef prior to skin incision as per protocol.  DVT prophylaxis was performed using stocking and SCD application on well leg.  A well-padded tourniquet was placed on the operative extremity and was elevated to 280mmHg for 88 minutes during the case.  The patient then was carefully positioned on beanbag with bump under ipsilateral hip with all superficial nerve areas, and bony prominences well-padded and protected during the case.  We then proceeded to prep and drape in the usual standard sterile fashion.  Open reduction internal fixation of fifth metatarsal fracture of shaft and neck nonunion.  We utilized a longitudinal incision based over the lateral aspect of fifth metatarsal extending to the fifth MTP joint in order to get good visualization due to the fracture configuration.  Incision was made with 15 blade and bipolar cautery was used for careful hemostasis.  Tift blade was utilized to go down to bone and elevate subperiosteal and capsular flaps anteriorly and posteriorly to expose the significantly impacted shortened and malrotated and angulated nonunited fracture.  We utilized Tift blade to clean out callus and scar tissue and excavate in order to see the main fracture fragments.  We then utilized a curette to prepare the bone to healthy bone at the fracture surfaces.  We then utilized Hohmann retractor along with pointed reduction clamp to bring the fracture back out to proper length and rotation.  We utilized buttress technique with L-plate from [place] 28 2.0 mm set.  We contoured the plate to fit the head and neck region of the metatarsal and the shaft.  Plate was placed and tacks were used to hold position.  We then were able to use the clamp against the plate to help maintain reduction.  We confirmed good position on mini C arm fluoroscopy AP lateral and oblique x-ray views.  We then proceeded to place a  lag screw through the plate across the main fracture fragments.  We used the 2.0 mm drill bit in the near cortex and the 1.5 mm drill bit and the far cortex.  We then measured and placed the lag screw with excellent bony purchase and fixation.  There was some bone loss related to combination once we brought the fifth metatarsal back out to proper length and position.  We did place demineralized bone matrix to fill some of the bone void plantarly and laterally.  We then proceeded to place locking screws distally in the plate using the locking tower.  We drilled measured and placed each in sequence.  We removed one of the tacks and were able to use that as a locking screw.  We then placed a cortical screw proximal to the fracture.  We then filled and the remaining available screw holes with locking screws utilizing the cone guide.  Once hardware was in place we confirmed excellent position of the fracture along with appropriate length and position of all hardware and screws on mini C arm fluoroscopy images.  We then proceeded with copious irrigation.  We then proceeded with deep capsular and periosteal closure watertight using interrupted 2-0 Vicryl mattress sutures.  We then irrigated again and then closed the subdermal tissue with interrupted 4-0 Vicryl suture.  Finally the skin was closed with 4-0 nylon.  Tourniquet was released and all toes were pink and warm and distal pulses intact.  Dressing including xeroform, fluffs, ABD's, soft roll and well padded posterior and sugar tong splint was applied.  Dressing completed with ace wraps up to calf.  The patient was transported to the PACU in stable condition.  Patient fulfilled post-procedure discharge criteria and was released home.  Patient and patient representatives were given detailed discharge instructions and home-going medications including Percocet for severe pain only, Zofran, Senna/Colace and DVT prophylaxis with enteric-coated aspirin to be started on  POD1.  Patient will be non-weightbearing on their operative extremity.  Dressing will be kept in place until follow-up with us in 10-14 days.  We discussed with the patient the different medications available for DVT prophylaxis and after discussion of risks and benefits the patient selected the above.  Findings were discussed with the patient and their representative after the procedure and questions were answered in detail.        Complications:  None; patient tolerated the procedure well.    Disposition: PACU - hemodynamically stable.  Condition: stable     Attending Attestation: I was present and scrubbed for the entire procedure.    Sravani Cabrales  Phone Number: [phone]

## 2024-11-14 NOTE — BRIEF OP NOTE
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    Surgeons      * Sravani Cabrales - Primary    Resident/Fellow/Other Assistant:  Surgeons and Role:     * Fran Parham MD - Resident - Assisting    Staff:   Circulator: Reba Townsend Person: Evelyn Craft Scrub: Debbie Craft Circulator: Lukasz    Anesthesia Staff: Anesthesiologist: Monster Alegria DO  CRNA: DENNISE Flores-MELINA    Procedure Summary  Anesthesia: Regional, General  ASA: III  Estimated Blood Loss: <5 mL  Intra-op Medications:   Administrations occurring from 0935 to 1140 on 24:   Medication Name Total Dose   ceFAZolin (Ancef) vial 1 g 2 g   fentaNYL PF 0.05 mg/mL 100 mcg   lidocaine (Xylocaine) injection 2 % 30 mg   propofol (Diprivan) injection 10 mg/mL 312.63 mg              Anesthesia Record               Intraprocedure I/O Totals       None           Specimen: No specimens collected   Findings: 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     Disposition: PACU - hemodynamically stable.  Condition: stable  Specimens Collected: No specimens collected  Attending attestation: I was present and scrubbed through entirety of case.  Dr. Keron Carlos MD  Orthopaedic Surgery, PGY-3  Available by Epic Chat  24  1:23 PM

## 2024-11-14 NOTE — ANESTHESIA PROCEDURE NOTES
Airway  Date/Time: 11/14/2024 11:12 AM  Urgency: elective    Airway not difficult    Staffing  Performed: CRNA   Authorized by: Monster Alegria DO    Performed by: DENNISE Flores-MELINA  Patient location during procedure: OR    Indications and Patient Condition  Indications for airway management: anesthesia  Spontaneous ventilation: present  Sedation level: deep  Preoxygenated: yes  MILS maintained throughout  Mask difficulty assessment: 1 - vent by mask  Planned trial extubation    Final Airway Details  Final airway type: supraglottic airway      Successful airway: Size 3      Additional Comments  IGEL

## 2024-11-18 ENCOUNTER — APPOINTMENT (OUTPATIENT)
Dept: PAIN MEDICINE | Facility: CLINIC | Age: 54
End: 2024-11-18
Payer: MEDICARE

## 2024-11-26 ENCOUNTER — OFFICE VISIT (OUTPATIENT)
Dept: ORTHOPEDIC SURGERY | Facility: CLINIC | Age: 54
End: 2024-11-26
Payer: MEDICARE

## 2024-11-26 DIAGNOSIS — S92.352A CLOSED DISPLACED FRACTURE OF FIFTH METATARSAL BONE OF LEFT FOOT, INITIAL ENCOUNTER: Primary | ICD-10-CM

## 2024-11-26 PROCEDURE — 99211 OFF/OP EST MAY X REQ PHY/QHP: CPT | Performed by: ORTHOPAEDIC SURGERY

## 2024-11-26 NOTE — PROGRESS NOTES
54 y.o.female here for pov s/p left foot 5th metatarsal fracture ORIF on 11/14/24  1. Closed displaced fracture of fifth metatarsal bone of left foot, initial encounter          . Patient reports they are doing well. No pain.  NWB on splint. Taking ASA      On examination of left foot, incision is clean/dry/intact.  Sutures are intact.  No bleeding or drainage. Healing well.  Minimal swelling. Improved ROM and strength.  Neurovascularly intact.  Normal sensation to light touch.  Dorsalis pedis and posterior tibial pulses 2+ bilaterally. wiggles toes, calf soft and nontender      A/P: s/p left foot 5th metatarsal fracture ORIF on 11/14/24  -  Patient is doing well  - Splint removed; incision cleaned and dressed   - Patient was placed in a well padded fiberglass cast/splint to be nonweightbearing with crutches. They will keep the cast/splint elevated supported at the calf with NO pressure on the heel to reduce swelling.  - Continue your aspirin daily with food for blood clot prevention  - Patient will increase their dietary calcium intake (milk,yogurt) and should get 1200 mg of calcium per day. They will also take a vitamin D supplement.  - All the patient's questions were answered. The patient agrees with the above plan.  Follow up in 2 weeks for suture removal

## 2024-11-26 NOTE — PATIENT INSTRUCTIONS
You were placed in a cast/splint to be nonweightbearing with your crutches. DO NOT place any weight on your cast/splint. It is important to keep your cast/splint elevated supported at the calf with NO pressure on the heel to reduce swelling.    Continue your aspirin daily with food for blood clot prevention    Patient will increase their dietary calcium intake (milk,yogurt) and should get 1200 mg of calcium per day. They will also take a vitamin D supplement.    Follow  up in 2 weeks for suture removal

## 2024-12-03 ENCOUNTER — OFFICE VISIT (OUTPATIENT)
Dept: ORTHOPEDIC SURGERY | Facility: CLINIC | Age: 54
End: 2024-12-03
Payer: MEDICARE

## 2024-12-03 DIAGNOSIS — S92.352A CLOSED DISPLACED FRACTURE OF FIFTH METATARSAL BONE OF LEFT FOOT, INITIAL ENCOUNTER: ICD-10-CM

## 2024-12-03 PROCEDURE — 99211 OFF/OP EST MAY X REQ PHY/QHP: CPT | Performed by: ORTHOPAEDIC SURGERY

## 2024-12-03 PROCEDURE — L4361 PNEUMA/VAC WALK BOOT PRE OTS: HCPCS | Performed by: ORTHOPAEDIC SURGERY

## 2024-12-03 NOTE — PROGRESS NOTES
Patient comes in s/p 5th metatarsal fracture Open Reduction Internal Fixation - Left She has been NWB on left foot in cast.  No pain, on ASA for DVT prophylaxis    Physical Exam:  Left foot : incisions clean/dry intact, calf soft and supple, toes pink and warm with good capillary refill, pulses intact, limb swelling appropriate, wiggles toes, 5th toe alignment appropriate    Assessment: s/p left 5th metatarsal fracture ORIF 11/14/24  Plan:  - Weightbearing instructions and restrictions discussed with patient  - DVT prophylaxis with ASA  - follow-up visit 3 weeks, xrays AP/lat/obl of left foot at next visit  - PT orders placed, instructed patient to call ahead to schedule appointment  Patient's questions were answered in detail and they are agreeable to above plan.

## 2024-12-05 ENCOUNTER — TELEPHONE (OUTPATIENT)
Dept: PAIN MEDICINE | Facility: CLINIC | Age: 54
End: 2024-12-05
Payer: MEDICARE

## 2024-12-05 NOTE — TELEPHONE ENCOUNTER
She is calling in to request something else be called in for a week to 10 days until the hydromorphone becomes available it was due for fill on (12/4/24  she states [facility] does not have any available in their surrounding pharmacies:  Please advise

## 2024-12-06 ENCOUNTER — TELEPHONE (OUTPATIENT)
Dept: PAIN MEDICINE | Facility: CLINIC | Age: 54
End: 2024-12-06
Payer: MEDICARE

## 2024-12-06 DIAGNOSIS — M50.90 CERVICAL DISC DISORDER: Primary | ICD-10-CM

## 2024-12-06 DIAGNOSIS — M96.1 POSTLAMINECTOMY SYNDROME, LUMBAR REGION: Primary | ICD-10-CM

## 2024-12-06 RX ORDER — HYDROCODONE BITARTRATE AND ACETAMINOPHEN 5; 325 MG/1; MG/1
1 TABLET ORAL 2 TIMES DAILY PRN
Qty: 14 TABLET | Refills: 0 | Status: SHIPPED | OUTPATIENT
Start: 2024-12-06 | End: 2024-12-13

## 2024-12-06 NOTE — TELEPHONE ENCOUNTER
Pt has called in multiple times asking if there is something else we can prescribe for her since the hydromorphone is on back order? She called yesterday and today. She is completely out of her medeication

## 2024-12-12 ENCOUNTER — TELEPHONE (OUTPATIENT)
Dept: PAIN MEDICINE | Facility: CLINIC | Age: 54
End: 2024-12-12
Payer: MEDICARE

## 2024-12-12 RX ORDER — OXYCODONE AND ACETAMINOPHEN 5; 325 MG/1; MG/1
1 TABLET ORAL 2 TIMES DAILY
Qty: 60 TABLET | Refills: 0 | Status: SHIPPED | OUTPATIENT
Start: 2024-12-12 | End: 2025-01-11

## 2024-12-12 NOTE — TELEPHONE ENCOUNTER
Patient called in stating the hydromorphone is still on back order at her pharmacy. Requesting a prescription for 3 weeks. Stated oxycodone worked better in the past for her. Was prescribed hydrocodone last week for 1 week. Her pharmacy is [facility]

## 2024-12-26 ENCOUNTER — TELEPHONE (OUTPATIENT)
Dept: PAIN MEDICINE | Facility: CLINIC | Age: 54
End: 2024-12-26
Payer: MEDICARE

## 2024-12-26 NOTE — TELEPHONE ENCOUNTER
Called patient LVM to Call Back office to get insurance information for the new year 2025 to start pre cert for procedure.

## 2024-12-31 ENCOUNTER — HOSPITAL ENCOUNTER (OUTPATIENT)
Dept: RADIOLOGY | Facility: CLINIC | Age: 54
Discharge: HOME | End: 2024-12-31
Payer: MEDICARE

## 2024-12-31 ENCOUNTER — OFFICE VISIT (OUTPATIENT)
Dept: ORTHOPEDIC SURGERY | Facility: CLINIC | Age: 54
End: 2024-12-31
Payer: MEDICARE

## 2024-12-31 DIAGNOSIS — S92.352A CLOSED DISPLACED FRACTURE OF FIFTH METATARSAL BONE OF LEFT FOOT, INITIAL ENCOUNTER: ICD-10-CM

## 2024-12-31 PROCEDURE — 99211 OFF/OP EST MAY X REQ PHY/QHP: CPT | Performed by: ORTHOPAEDIC SURGERY

## 2024-12-31 PROCEDURE — 73630 X-RAY EXAM OF FOOT: CPT | Mod: LEFT SIDE | Performed by: RADIOLOGY

## 2024-12-31 PROCEDURE — 73630 X-RAY EXAM OF FOOT: CPT | Mod: LT

## 2024-12-31 NOTE — PROGRESS NOTES
This is a pleasant 54 y.o. year old female who presents for fuv of left foot.  She has been NWB.  She had her PT evaluation end of last week and has appointments scheduled.      PHYSICAL EXAMINATION  Constitutional Exam: patient's height and weight reviewed, well-kempt  Psychiatric Exam: alert and oriented x 3, appropriate mood and behavior  Eye Exam: MANE, EOMI  Pulmonary Exam: breathing non-labored, no apparent distress  Lymphatic exam: no appreciable lymphadenopathy in the lower extremities  Cardiovascular exam: DP pulses 2+ bilaterally, PT 2+ bilaterally, toes are pink with good capillary refill, no pitting edema  Skin exam: no open lesions, rashes, abrasions or ulcerations  Neurological exam: sensation to light touch intact in both lower extremities in peripheral and dermatomal distributions (except for any abnormalities noted in musculoskeletal exam)    Musculoskeletal exam: left foot: swelling down, mild soreness over 5th MTP joint, no soreness over neck or shaft of the bone, ankle and ST ROM better, incision healed ,alignment 5th toe appropriate    DATA/RESULTS REVIEW: I personally reviewed the patient's x-ray images and reports of the  left foot show healing fracture 5th metatarsal neck and shaft, comminution present, bone graft awaiting full consolidation, hardware intact .  Callus forming plantarly with consolidation.     ASSESSMENT: left foot 5th metatarsal significantly comminuted neck and shaft fracture healing  PLAN: Further treatment options discussed including starting to weightbear on her left heel in the boot with 2 crutches or walker.  She will start PT, updated rx.  Continue Vit D and calcium intake.  FUV in 3-4 weeks for repeat xrays ap/lat/obl left foot out of boot.  Discussed recommendation not to drive at this time.  The patient's questions were answered in detail.      Note dictated with [employer] software, completed without full type editing to avoid delay.

## 2025-01-02 ASSESSMENT — PAIN - FUNCTIONAL ASSESSMENT: PAIN_FUNCTIONAL_ASSESSMENT: 0-10

## 2025-01-02 NOTE — PROGRESS NOTES
Physical Therapy  Physical Therapy Evaluation and Treatment      Patient Name: Samina Celeste  MRN: [id number]  Today's Date: 1/3/2025    Time Entry: Start time: 1215            End time: 0100     Reason for Visit  Referred Dx:Closed displaced fracture of fifth metatarsal bone of left foot, initial encounter [G60.352A]  Referred By: Sravani Cabrales MD   Insurance Information  Visit #: 1  Approved Visits: MED NEC VISITS   Auth required:  AUTH REQ   Insurance:BRIANNA   : verified with patient  DOS:2024    Current problem: S/p Closed displaced fracture of fifth metatarsal bone of left foot, initial encounter [J74.359A]    Subjective      Chief complaint: The patient presents with the c/o pain in heel/ and foot, putting any pressure on L foot increase the pain, pain and difficulty standing in the shower.  Onset: Oct 2024  [place]: The patient reports that she was walking inside her house from the garage, which involves navigating four steps. She states she tripped on her left leg and fell forward. After standing up, she noticed pain but decided to wait for two days to see if it improved. When the pain persisted, she [facility] medical attention [facility], where an X-ray was performed, and she was referred to an orthopedic specialist in [place].The patient saw the orthopedic doctor, who provided her with a boot and referred her to Dr. Cabrales. According to the patient, Dr. Cabrales recommended staying off the foot to allow the fracture to heal. The patient attempted to rest and limit weight-bearing as much as possible but admitted she did not use any assistive devices,except boot, at that time. However, her pain worsened. During a follow-up appointment with Dr. Cabrales, it was noted that the fracture had not healed sufficiently, and surgery was recommended. The patient underwent surgery on her left foot on 2024. Post-operatively, her foot and ankle were wrapped for 1.5 weeks, followed by a cast for  an additional week to prevent movement. After the cast was removed, she was fitted with a boot and advised to remain non-weight-bearing as of 12/3/2024. She states that she used a scooter to assist with mobility and to avoid placing weight on her foot after surgery. The patient had a follow-up appointment with Dr. Cabrales on 12/31/2024, during which an X-ray showed that the fracture was healing but not fully healed. At that time, she was instructed to begin partial weight-bearing inside the boot, placing weight on her heel as tolerated, with the assistance of crutches or a walker. She was also referred to physical therapy to address her left foot. The patient reports that she has a follow-up appointment scheduled with Dr. Cabrales in three weeks.  X- ray (12/31) findings:  - Postsurgical changes of left 5th metatarsal shaft fracture fixation.  - Hardware satisfactory. Healing with callus.   PMHx: Fatigue, Anxiety, History of migraine, hand inj, Hypertension, Disorder of bursae of shoulder, Insomnia, Laceration of L middle finger, Chronic low back pain, Lumbar post laminectomy syndrome, Muscle spasm, Left knee pain, Left foot pain, Psychogenic pain, H/o degenerative disc disease, Bursitis hip, Closed displaced fx of fifth metatarsal bone of L foot  Barriers to the treatment: N/A  PLOF: independent without restrictions  Medications: see chart for full medication list   Medical Screening: Patient denies bowel/bladder changes, pulsatile mass in abdomen, recent infection/illness, calf pain, headaches, chest pain, SOB, redness/warmth/swelling in LE   Functional Limitations: Patient presents with pain in L heel/ and foot, putting any pressure on L foot increase the pain, pain and difficulty standing in the shower.  Patient goals for PT: The patient states that she wants to be able to stand and walk and do everything she use to before Fx with no pain in L foot.  Precautions:  Precautions  LEILAADI Fall Risk Score (The  score of 4 or more indicates an increased risk of falling): 2 (Nov 20, 2024, Dec 2024, The patient states that her scooter hit a bumped on to something and she fell and in Dec she slipped of the bed when she was trying to use her scooter. The patient states that she didn't put her foot down B times)  Precautions Comment: mod - high fall risk  Sx related precautions:   Weightbearing on heel in the boot (take half step and use 2 crutches or walker, per doctor's order from referral)  Pain:  Pain Assessment  Pain Assessment: 0-10  0-10 (Numeric) Pain Score: 3  Pain Type: Surgical pain  Pain Location: Foot (around the sx site, L side)  Pain Orientation: Left  Pain Radiating Towards: Patient denies raditing pain other than the pain in the foot around the sx site.  Pain Descriptors: Throbbing, Sharp, Shooting  Pain Frequency: Constant/continuous  Patient's Stated Pain Goal: No pain  Pain Interventions: Medication (See MAR), Other (Comment), Rest  At Best: 2/10  At Worst: 5/10  Aggravating Factors: moving the foot in the boot , touch the foot or try to put the weight on L foot  Relieving factors: elevating the foot, pain meds  Home Living:  Home Living  Home Living Comment: The patient states that she lives in 2 story house with the bedroom upstairs, but she lives on the first floor since the sx. The patient states that she doesn't do any house chores or work because of the pain in the foot. Patient states that she gets a help from her .  Prior Level of Function:  Prior Function Per Pt/Caregiver Report  Level of Boomer: Needs assistance with functional transfers, Needs assistance with homemaking  Receives Help From: Family ()  ADL Assistance: Independent  Ambulatory Assistance: Needs assistance  Transfers: Minimal  Gait: Assistive device  Stairs: Unable to assess  Vocational: On disability    Objective   Observation:  Posture: Hallux- L foot, knee hyperextgension B side (L>R)  Palpation:   TTP: around  the scar, dorsum of foot (3- 5 metatarsals) L side  Spasm/Tightness: Achillis, toe flexors L side  Scar: healing mobile sx scar parallel to the 5 the metatarsal with dry scalyskin  Swelling: Mild swelling and raised temp around the scar  Examination:  Functional Performance:   DL HR: Differed , s/p wt bearing related limitations   SL HR: Differed, s/p wt bearing related limitations  DL Squat:Differed, s/p wt bearing related limitations   SL Squat:Differed, s/p wt bearing related limitations   SLS: Differed, s/p wt bearing related limitations   Step up/down: Differed, s/p wt bearing related limitations   Lateral step down: Differed, s/p wt bearing related limitations  ROM:  Hip ROM: wfl   Flexion: R: wfl L:wfl    Extension:  R: wfl L:wfl   Abduction:  R: 30 L: 30   IR: R: 40 L: 40   ER: R: 30 L: 25  Knee ROM: wfl   Flexion:R: wfl L:wfl    Extension: R:5* L: 5*   Ankle ROM:   DF: R:10* L: -7*   PF: R: 45 L: -7* - 45   Inv: R:40 L: 26   EV: R: 15 L: 7  MMT:  Ankle MMT:  DF: R:4+/5 L:3+/5 (painful)   PF: R:4+/5 L:3/5 (painful)   Inv: R:4+/5 L: 3+/5 (painful)   Ev: R:4+/5 L:3/5  (painful)  Knee MMT:   Flex: R:4+/5 L:4/5   Ext: R:/5 L:4/5  Hip MMT:   Hip Flexion: R:4/5 L:4/5   Prone hip extension: R:4-/5 L4-/5   Hip abduction: R:3+/5 L:3+/5   Hip IR: R:4-/5 L:4-/5   Hip ER:R:4-/5 L:4-/5   Note: Over activation of hip adductors and IR noted during the movt, with tightness in hip adductors ( B side)  Neuro Exam:   Dermatome exam: Normal  Myotome exam: Normal  Reflexes: 2+  Stairs negotiation: Differed, s/p wt bearing related limitations   Gait Analysis: Patient walked to the clinic non weight bearing using a scooter with the cam boot on L leg.   Joint Play Assessment: Differed  Outcome Measures:  Other Measures  Lower Extremity Funtional Score (LEFS): 8 = 10% = sever functional limitations     Assessment:  PT Assessment  PT Assessment Results: Decreased strength, Decreased range of motion, Decreased endurance, Impaired  balance, Decreased mobility, Pain, Orthopedic restrictions  Rehab Prognosis: Good  Evaluation/Treatment Tolerance: Patient tolerated treatment well  Strengths: Ability to acquire knowledge   A 53 y/o female patient presents with 6 wk s/p Closed displaced fracture of fifth metatarsal bone of left foot with the chief complaint of L ankle/ foot pain that has been present since the fx and sx. Patient notes that  any movement and weightbearing on the heel of the L ankle tends to aggravate his/her sxs. Patient demonstrates decreased ankle ROM, decreased ankle strength, impaired balance/ankle proprioception, decreased functional strength, impaired gait quality, and increased ankle pain, which limits her functional ability. Patient would likely benefit from skilled outpatient PT in order to address the above deficits and return to PLOF.     Plan:  OP PT Plan  Treatment/Interventions: Aquatic therapy, Cryotherapy, Biofeedback, Dry needling, Education/ Instruction, Electrical stimulation, Fluidotherapy, Hot pack, Gait training, Manual therapy, Neuromuscular re-education, Taping techniques, Therapeutic exercises, Therapeutic activities, Vasopneumatic device, Ultrasound, Other (comment)  PT Plan: Skilled PT  PT Frequency: 1 time per week  Duration: 8-12 wks  Number of Treatments Authorized: MN  Rehab Potential: Good  Plan of Care Agreement: Patient   EDUCATION:  Outpatient Education  Individual(s) Educated: Patient  Education Provided: Body Mechanics, Fall Risk, Home Exercise Program, POC, Other, Post-Op Precautions  Risk and Benefits Discussed with Patient/Caregiver/Other: yes  Patient/Caregiver Demonstrated Understanding: yes  Plan of Care Discussed and Agreed Upon: yes  Patient Response to Education: Patient/Caregiver Verbalized Understanding of Information  Goals:  Patient will improve in pain by 2 points on NPRS  Patient will demonstrate improvement in LEFS score by at least 9 points in order to meet MCID  Patient will  demonstrate full ankle AROM without pain or compensation  Patient will demonstrate at least 4+/5 ankle and glute med/max strength in all planes  Patient will demonstrate independence with HEP  Patient will be able to ambulate community distances without pain or compensation  Patient will be able to negotiate 1 flight of stairs reciprocally without pain or compensation  Patient will be able to perform 15 DL/SL HR without pain or compensation  Treatment today:   Initail Eval: ( 87832:  25 min  : 1 units), There Ex: ( 52214 :  20 min : 1 units)  1. Initial evaluation   2. Pt ed on diagnosis, prognosis, goals of PT, expectations   3. There Ex:  Calf stretch - 1 x 3 reps, hold 20s  Ankle ABC - 1 x 10 reps, hold 3s  Towel curls- 1 x 10 reps, hold 3s  Towel slides - 1 x 10 reps, hold 3s  Heel and toe raises- seated- 1 x 10 reps, hold 3s   MTP flex- ext- 1 x 10 reps, hold 3s   - Patient performed well during today's session. Increased pain sensitivity and tenderness to the touch around the scar and dorsum of the foot reported. The patient will benefit from PT for once a week and follow up with HEP until partial allowed full weight bearing. The patient will be treated to improve ankle and foot joint mobility , flexibility and gradually trained for weightbearing as tolerated in the boot using a walker/ crutch as tolerated.   4. HEP (see below) ed on normal vs abnormal response    HEP  CALF STRETCH WITH TOWEL - GASTROCNEMIUS   - While in a seated position, place a towel around the ball of your foot and pull your ankle back until a stretch is felt on your calf area. Keep your knee in a straightened position during the stretch.   - Repeat 3 Times Hold 15 Seconds Complete 1 Set Perform 2 Times a Day   ANKLE ABC's   - While in a seated position, write out the alphabet in the air with your big toe. Your ankle should be moving as you perform this.   - Repeat 10 Times Hold 3 Seconds Complete 2 Sets Perform 2 Times a Day   TOWEL CURLS  - TOWEL SCRUNCHES   - While seated, use a towel and draw it back towards you using your toes. Curl your toes inward. Be sure to keep your heel in contact with the floor the entire time.   - Repeat 10 Times Hold 3 Seconds Complete 2 Sets Perform 2 Times a Day   TOWEL SLIDES - EVERSION   - While seated, use a towel and slide it with your foot across the floor in an outward direction. Be sure to keep your heel in contact with the floor the entire time.   - Repeat 10 Times Hold 3 Seconds Complete 2 Sets Perform 2 Times a Day   Toe Series   - Sit with knee stacked above ankle. Maintain the ball of the foot and heel on the floor the entire exercise.   1) Lift the big toe, keeping the little toes planted on the floor.  2) Lift the little toes, keeping the big toe planted on the floor.   - Repeat 10 Times Hold 3 Seconds Complete 2 Sets Perform 2 Times a Day   ANKLE PUMPS - SEATED AP  - While seated with feet on the floor, press your toes into the floor so that your heels raise up off the floor. Then, relax to allow your heels to lower and then lift your toes off the floor as your heels press into the floor. Alternate and repeat. - Repeat 10 Times Hold 3 Seconds Complete 2 Sets Perform 2 Times a Day

## 2025-01-03 ENCOUNTER — APPOINTMENT (OUTPATIENT)
Dept: PRIMARY CARE | Facility: CLINIC | Age: 55
End: 2025-01-03
Payer: MEDICARE

## 2025-01-03 ENCOUNTER — EVALUATION (OUTPATIENT)
Dept: PHYSICAL THERAPY | Facility: CLINIC | Age: 55
End: 2025-01-03
Payer: MEDICARE

## 2025-01-03 VITALS
SYSTOLIC BLOOD PRESSURE: 170 MMHG | WEIGHT: 180 LBS | DIASTOLIC BLOOD PRESSURE: 98 MMHG | HEIGHT: 64 IN | TEMPERATURE: 97.2 F | BODY MASS INDEX: 30.73 KG/M2

## 2025-01-03 DIAGNOSIS — S92.352A CLOSED DISPLACED FRACTURE OF FIFTH METATARSAL BONE OF LEFT FOOT, INITIAL ENCOUNTER: ICD-10-CM

## 2025-01-03 DIAGNOSIS — E03.9 HYPOTHYROIDISM, UNSPECIFIED TYPE: ICD-10-CM

## 2025-01-03 DIAGNOSIS — Z00.00 ROUTINE GENERAL MEDICAL EXAMINATION AT HEALTH CARE FACILITY: Primary | ICD-10-CM

## 2025-01-03 DIAGNOSIS — I10 HYPERTENSION, UNSPECIFIED TYPE: ICD-10-CM

## 2025-01-03 DIAGNOSIS — R73.03 PREDIABETES: ICD-10-CM

## 2025-01-03 PROBLEM — E66.01 OBESITY, MORBID (MULTI): Status: RESOLVED | Noted: 2023-09-14 | Resolved: 2025-01-03

## 2025-01-03 PROCEDURE — 83036 HEMOGLOBIN GLYCOSYLATED A1C: CPT

## 2025-01-03 PROCEDURE — 97110 THERAPEUTIC EXERCISES: CPT | Mod: GP | Performed by: PHYSICAL THERAPIST

## 2025-01-03 PROCEDURE — 85025 COMPLETE CBC W/AUTO DIFF WBC: CPT

## 2025-01-03 PROCEDURE — 84443 ASSAY THYROID STIM HORMONE: CPT

## 2025-01-03 PROCEDURE — 80061 LIPID PANEL: CPT

## 2025-01-03 PROCEDURE — 80053 COMPREHEN METABOLIC PANEL: CPT

## 2025-01-03 PROCEDURE — 97161 PT EVAL LOW COMPLEX 20 MIN: CPT | Mod: GP | Performed by: PHYSICAL THERAPIST

## 2025-01-03 RX ORDER — LEVOTHYROXINE SODIUM 25 UG/1
25 TABLET ORAL DAILY
Start: 2025-01-03 | End: 2025-05-03

## 2025-01-03 ASSESSMENT — ENCOUNTER SYMPTOMS
DEPRESSION: 0
ABDOMINAL PAIN: 0
SINUS PRESSURE: 0
ARTHRALGIAS: 0
MYALGIAS: 0
COUGH: 0
WOUND: 0
SORE THROAT: 0
BLOOD IN STOOL: 0
TROUBLE SWALLOWING: 0
SHORTNESS OF BREATH: 0
HEMATURIA: 0
SLEEP DISTURBANCE: 0
FEVER: 0
OCCASIONAL FEELINGS OF UNSTEADINESS: 0
ADENOPATHY: 0
FATIGUE: 0
NUMBNESS: 0
CONSTIPATION: 0
VOICE CHANGE: 0
LOSS OF SENSATION IN FEET: 0
WEAKNESS: 0
DIZZINESS: 0
NAUSEA: 0
ROS SKIN COMMENTS: NO MOLES GROWING OR CHANGING.
PALPITATIONS: 0
BACK PAIN: 1
VOMITING: 0
HEADACHES: 0
RHINORRHEA: 0
NERVOUS/ANXIOUS: 0
WHEEZING: 0
DIARRHEA: 0
DYSPHORIC MOOD: 0
FREQUENCY: 0

## 2025-01-03 ASSESSMENT — ACTIVITIES OF DAILY LIVING (ADL)
BATHING: INDEPENDENT
MANAGING_FINANCES: INDEPENDENT
TAKING_MEDICATION: INDEPENDENT
GROCERY_SHOPPING: INDEPENDENT
DOING_HOUSEWORK: INDEPENDENT
ADL_ASSISTANCE: INDEPENDENT
DRESSING: INDEPENDENT

## 2025-01-03 ASSESSMENT — PAIN DESCRIPTION - DESCRIPTORS: DESCRIPTORS: THROBBING;SHARP;SHOOTING

## 2025-01-03 ASSESSMENT — PAIN SCALES - GENERAL: PAINLEVEL_OUTOF10: 3

## 2025-01-03 ASSESSMENT — PAIN - FUNCTIONAL ASSESSMENT: PAIN_FUNCTIONAL_ASSESSMENT: 0-10

## 2025-01-03 NOTE — PROGRESS NOTES
Subjective   Reason for Visit: Samina Celeste is an 54 y.o. female here for a Medicare Wellness visit.        She had a surgery in November for a left fifth MT  fracture.     She is recovering fairly well.  Wearing a boot.  Started PT today.    Otherwise no surgeries or hospitalizations over the last year.     Reviewed all medications by prescribing practitioner or clinical pharmacist (such as prescriptions, OTCs, herbal therapies and supplements) and documented in the medical record.  Current Outpatient Medications on File Prior to Visit   Medication Sig Dispense Refill    aspirin 325 mg EC tablet Take 1 tablet (325 mg) by mouth once daily. Do not start before November 15, 2024. 21 tablet 0    buPROPion XL (Wellbutrin XL) 300 mg 24 hr tablet Take 1 tablet (300 mg) by mouth once daily in the morning. Do not crush, chew, or split. 90 tablet 0    calcium carbonate (CALCIUM 500 ORAL) Take by mouth.      cholecalciferol, vitamin D3, (VITAMIN D3 ORAL) Take by mouth.      DULoxetine (Cymbalta) 60 mg DR capsule Take 1 capsule (60 mg) by mouth 2 times a day. Do not crush or chew. 180 capsule 0    fexofenadine (Allegra Allergy) 180 mg tablet Take 1 tablet (180 mg) by mouth once daily.      fluoride, sodium, (Prevident 5000 Booster) 1.1 % dental paste Apply 1 Application to teeth once daily.      HYDROmorphone (Dilaudid) 4 mg tablet Take 1 tablet (4 mg) by mouth 2 times a day as needed for severe pain (7 - 10). Do not fill before December 4, 2024. 60 tablet 0    HYDROmorphone (Dilaudid) 4 mg tablet Take 1 tablet (4 mg) by mouth 2 times a day as needed for severe pain (7 - 10). Do not fill before January 3, 2025. 60 tablet 0    [START ON 2/2/2025] HYDROmorphone (Dilaudid) 4 mg tablet Take 1 tablet (4 mg) by mouth 2 times a day as needed for severe pain (7 - 10). Do not fill before February 2, 2025. 60 tablet 0    hydroxychloroquine (Plaquenil) 200 mg tablet Take 1 tablet (200 mg) by mouth once daily.      metoprolol  tartrate (Lopressor) 50 mg tablet Take 1 tablet by mouth 2 times a day. 180 tablet 3    multivitamin-min-iron-FA-vit K (Adults Multivitamin) 18 mg iron-400 mcg-25 mcg tablet Take 1 tablet by mouth once daily.      naloxone (Narcan) 4 mg/0.1 mL nasal spray Administer 1 spray (4 mg) into affected nostril(s) if needed for respiratory depression.      omeprazole (PriLOSEC) 20 mg DR capsule Take 1 capsule (20 mg) by mouth once daily. Do not crush or chew 90 capsule 3    oxyCODONE-acetaminophen (Percocet) 5-325 mg tablet Take 1 tablet by mouth 2 times a day. 60 tablet 0    tiZANidine (Zanaflex) 4 mg tablet TAKE 1 TABLET 3 TIMES DAILY AS NEEDED FOR MUSCLE SPASM PAIN 270 tablet 0    UNABLE TO FIND Take 10,000 mcg by mouth once daily. Med Name: BIOTIN      [DISCONTINUED] cyclobenzaprine (Flexeril) 10 mg tablet Take 1 tablet (10 mg) by mouth 3 times a day as needed for muscle spasms for up to 21 days. (Patient not taking: Reported on 11/14/2024) 30 tablet 0    [DISCONTINUED] docusate sodium (Colace) 100 mg capsule Take 1 capsule (100 mg) by mouth 2 times a day. 20 capsule 0    [DISCONTINUED] levothyroxine (Synthroid, Levoxyl) 25 mcg tablet Take 1 tablet (25 mcg) by mouth early in the morning.. Take on an empty stomach at the same time each day, either 30 to 60 minutes prior to breakfast 30 tablet 3    [DISCONTINUED] ondansetron (Zofran) 4 mg tablet Take 1 tablet (4 mg) by mouth every 8 hours if needed for nausea or vomiting. 20 tablet 0    [DISCONTINUED] predniSONE (Deltasone) 20 mg tablet Take three po every day for three days then two po every day for three days then one po every day for three days then stop. (Patient not taking: Reported on 11/7/2024) 18 tablet 0    [DISCONTINUED] saccharomyces boulardii (Florastor) 250 mg capsule Take 1 capsule (250 mg) by mouth 2 times a day. (Patient not taking: Reported on 11/7/2024)       No current facility-administered medications on file prior to visit.       HPI  Tobacco Use:  "Medium Risk (1/3/2025)    Patient History     Smoking Tobacco Use: Former     Smokeless Tobacco Use: Never     Passive Exposure: Not on file   She quit smoking 25 years ago.  Rare alcohol in small amounts.  No drug use.    She does not exercise.  She states she does not maintain a healthy diet.    No advanced  directives.  Full code.  POA would be  [place].    Patient Care Team:  Noe Velazquez DO as PCP - General  Noe Velazquez DO as PCP - [employer]age PCP     Review of Systems   Constitutional:  Negative for fatigue and fever.   HENT:  Negative for rhinorrhea, sinus pressure, sore throat, trouble swallowing and voice change.    Respiratory:  Negative for cough, shortness of breath and wheezing.    Cardiovascular:  Negative for chest pain, palpitations and leg swelling.   Gastrointestinal:  Negative for abdominal pain, blood in stool, constipation, diarrhea, nausea and vomiting.   Genitourinary:  Negative for enuresis, frequency, hematuria and vaginal bleeding.   Musculoskeletal:  Positive for back pain. Negative for arthralgias and myalgias.        Fractured left foot.   Skin:  Negative for rash and wound.        No moles growing or changing.   Neurological:  Negative for dizziness, syncope, weakness, numbness and headaches.   Hematological:  Negative for adenopathy.   Psychiatric/Behavioral:  Negative for dysphoric mood, self-injury, sleep disturbance and suicidal ideas. The patient is not nervous/anxious.        Objective   Vitals:  BP (!) 170/98 (BP Location: Left arm, Patient Position: Sitting)   Temp 36.2 °C (97.2 °F) (Skin)   Ht 1.613 m (5' 3.5\")   Wt 81.6 kg (180 lb)   BMI 31.39 kg/m²       Physical Exam    Assessment & Plan  Hypothyroidism, unspecified type    Orders:    levothyroxine (Synthroid, Levoxyl) 25 mcg tablet; Take 1 tablet (25 mcg) by mouth early in the morning.. Take on an empty stomach at the same time each day, either 30 to 60 minutes prior to breakfast    " Thyroid Stimulating Hormone       Annual Wellness exam completed   Preventive Health history reviewed:  Labs ordered.  Mammogram done July 2024.  Normal.  Recommended repeat in one year.  BMD not indicated.    Cscope was ordered in July 2024 but not yet done.  She agrees to do it when her fracture heals. Advised to call when she is ready/healed.  Low dose CT chest for lung cancer screening not indicated.  Stopped smoking 25 years ago.  Depression Screening done  Advanced Directives Discussion Completed  Cardiovascular risk discussed and if needed, lifestyle modifications recommended, including nutritional choices, exercise, and elimination of habits contributing to risk.  We agreed on a plan to reduce the current cardiovascular risk.  See ecalc ASCVD Risk  Plus for data discussed regarding risk and risk reduction opportunities.  Aspirin use is recommended after reviewing the updated guidelines.   Vaccines:  Influenza recommended at the pharmacy (our office ran out this year)  Shingrix recommended at the pharmacy.        I will order labs today.  Call when your fracture is healed and we will reorder the colonoscopy.  I recommend that you get a flu and Shingrix vaccine at the pharmacy.  You will be due for a mammogram in July.  Return in six months.  Proceed with physical therapy, which you just started today.

## 2025-01-03 NOTE — ASSESSMENT & PLAN NOTE
Orders:    levothyroxine (Synthroid, Levoxyl) 25 mcg tablet; Take 1 tablet (25 mcg) by mouth early in the morning.. Take on an empty stomach at the same time each day, either 30 to 60 minutes prior to breakfast    Thyroid Stimulating Hormone

## 2025-01-03 NOTE — PATIENT INSTRUCTIONS
I will order labs today.  Call when your fracture is healed and we will reorder the colonoscopy.  I recommend that you get a flu and Shingrix vaccine at the pharmacy.  You will be due for a mammogram in July.  Return in six months.  Proceed with physical therapy which you just started today.

## 2025-01-04 LAB
ALBUMIN SERPL BCP-MCNC: 4.8 G/DL (ref 3.4–5)
ALP SERPL-CCNC: 74 U/L (ref 33–110)
ALT SERPL W P-5'-P-CCNC: 22 U/L (ref 7–45)
ANION GAP SERPL CALC-SCNC: 16 MMOL/L (ref 10–20)
AST SERPL W P-5'-P-CCNC: 24 U/L (ref 9–39)
BASOPHILS # BLD AUTO: 0.04 X10*3/UL (ref 0–0.1)
BASOPHILS NFR BLD AUTO: 0.4 %
BILIRUB SERPL-MCNC: 0.7 MG/DL (ref 0–1.2)
BUN SERPL-MCNC: 11 MG/DL (ref 6–23)
CALCIUM SERPL-MCNC: 10.2 MG/DL (ref 8.6–10.3)
CHLORIDE SERPL-SCNC: 94 MMOL/L (ref 98–107)
CHOLEST SERPL-MCNC: 179 MG/DL (ref 0–199)
CHOLESTEROL/HDL RATIO: 2.7
CO2 SERPL-SCNC: 26 MMOL/L (ref 21–32)
CREAT SERPL-MCNC: 0.84 MG/DL (ref 0.5–1.05)
EGFRCR SERPLBLD CKD-EPI 2021: 82 ML/MIN/1.73M*2
EOSINOPHIL # BLD AUTO: 0.07 X10*3/UL (ref 0–0.7)
EOSINOPHIL NFR BLD AUTO: 0.8 %
ERYTHROCYTE [DISTWIDTH] IN BLOOD BY AUTOMATED COUNT: 12.8 % (ref 11.5–14.5)
EST. AVERAGE GLUCOSE BLD GHB EST-MCNC: 117 MG/DL
GLUCOSE SERPL-MCNC: 89 MG/DL (ref 74–99)
HBA1C MFR BLD: 5.7 %
HCT VFR BLD AUTO: 37.6 % (ref 36–46)
HDLC SERPL-MCNC: 65.8 MG/DL
HGB BLD-MCNC: 12.1 G/DL (ref 12–16)
IMM GRANULOCYTES # BLD AUTO: 0.02 X10*3/UL (ref 0–0.7)
IMM GRANULOCYTES NFR BLD AUTO: 0.2 % (ref 0–0.9)
LDLC SERPL CALC-MCNC: 89 MG/DL
LYMPHOCYTES # BLD AUTO: 1.9 X10*3/UL (ref 1.2–4.8)
LYMPHOCYTES NFR BLD AUTO: 20.6 %
MCH RBC QN AUTO: 30.5 PG (ref 26–34)
MCHC RBC AUTO-ENTMCNC: 32.2 G/DL (ref 32–36)
MCV RBC AUTO: 95 FL (ref 80–100)
MONOCYTES # BLD AUTO: 0.56 X10*3/UL (ref 0.1–1)
MONOCYTES NFR BLD AUTO: 6.1 %
NEUTROPHILS # BLD AUTO: 6.63 X10*3/UL (ref 1.2–7.7)
NEUTROPHILS NFR BLD AUTO: 71.9 %
NON HDL CHOLESTEROL: 113 MG/DL (ref 0–149)
NRBC BLD-RTO: 0 /100 WBCS (ref 0–0)
PLATELET # BLD AUTO: 408 X10*3/UL (ref 150–450)
POTASSIUM SERPL-SCNC: 4.3 MMOL/L (ref 3.5–5.3)
PROT SERPL-MCNC: 7.7 G/DL (ref 6.4–8.2)
RBC # BLD AUTO: 3.97 X10*6/UL (ref 4–5.2)
SODIUM SERPL-SCNC: 132 MMOL/L (ref 136–145)
TRIGL SERPL-MCNC: 119 MG/DL (ref 0–149)
TSH SERPL-ACNC: 0.39 MIU/L (ref 0.44–3.98)
VLDL: 24 MG/DL (ref 0–40)
WBC # BLD AUTO: 9.2 X10*3/UL (ref 4.4–11.3)

## 2025-01-07 ENCOUNTER — LAB (OUTPATIENT)
Dept: LAB | Facility: LAB | Age: 55
End: 2025-01-07
Payer: MEDICARE

## 2025-01-07 ENCOUNTER — APPOINTMENT (OUTPATIENT)
Dept: RHEUMATOLOGY | Facility: CLINIC | Age: 55
End: 2025-01-07
Payer: MEDICARE

## 2025-01-07 VITALS
TEMPERATURE: 97.5 F | RESPIRATION RATE: 16 BRPM | HEIGHT: 63 IN | WEIGHT: 187.2 LBS | SYSTOLIC BLOOD PRESSURE: 95 MMHG | OXYGEN SATURATION: 99 % | BODY MASS INDEX: 33.17 KG/M2 | HEART RATE: 68 BPM | DIASTOLIC BLOOD PRESSURE: 62 MMHG

## 2025-01-07 DIAGNOSIS — Z78.0 POSTMENOPAUSAL ESTROGEN DEFICIENCY: ICD-10-CM

## 2025-01-07 DIAGNOSIS — M35.9 UNDIFFERENTIATED CONNECTIVE TISSUE DISEASE (MULTI): ICD-10-CM

## 2025-01-07 DIAGNOSIS — S92.352S CLOSED DISPLACED FRACTURE OF FIFTH METATARSAL BONE OF LEFT FOOT, SEQUELA: ICD-10-CM

## 2025-01-07 PROCEDURE — 86038 ANTINUCLEAR ANTIBODIES: CPT

## 2025-01-07 PROCEDURE — 86160 COMPLEMENT ANTIGEN: CPT

## 2025-01-07 PROCEDURE — 86225 DNA ANTIBODY NATIVE: CPT

## 2025-01-07 PROCEDURE — 86235 NUCLEAR ANTIGEN ANTIBODY: CPT

## 2025-01-07 PROCEDURE — 84156 ASSAY OF PROTEIN URINE: CPT

## 2025-01-07 PROCEDURE — 82570 ASSAY OF URINE CREATININE: CPT

## 2025-01-07 RX ORDER — HYDROXYCHLOROQUINE SULFATE 200 MG/1
200 TABLET, FILM COATED ORAL DAILY
Qty: 90 TABLET | Refills: 1 | Status: SHIPPED | OUTPATIENT
Start: 2025-01-07

## 2025-01-07 ASSESSMENT — ENCOUNTER SYMPTOMS
DEPRESSION: 0
LOSS OF SENSATION IN FEET: 0
OCCASIONAL FEELINGS OF UNSTEADINESS: 0

## 2025-01-07 NOTE — PATIENT INSTRUCTIONS
Check spot urine protein to creatinine ratio.  Check FAMILIA with panel, C3,and C4.  Consider Rogaine for Women for hair loss.  Bone density ordered.  Follow-up in 6 months.

## 2025-01-07 NOTE — PROGRESS NOTES
Subjective   Patient ID: Samina Celeste is a 54 y.o. female who presents for follow-up regarding undifferentiated connective tissue disease..    HPI 53 yo F here for f/u re: undifferentiated connective tissue disease (diagnosed 2004 with symptoms of severe fatigue after sun exposure, arthralgias of wrist and ankles, FAMILIA 1:80 with negative FAMILIA panel).   FAMILIA has been negative since 2017.    She tripped on a step October 9, 2024 and fractured her left fifth metatarsal.  She had surgery for open reduction and internal fixation by Dr. Cabrales November 14, 2024.  She is currently allowed to do partial weightbearing on her heel.  She is currently in physical therapy.     HCQ was reduced to 200 mg daily 6/21, without any worsening of symptoms.     She continues to deny peripheral joint pain. She denies rashes, dry eyes ,  or hair loss.  She perhaps has mild dry mouth.    X-ray of left shoulder done September 2023 shows advanced osteoarthritis of the left acromioclavicular joint.  Glenohumeral joint itself looks unremarkable.  She has no exacerbation of pain with lifting her arm overhead, or flexion of her arm across her chest.    She also the history of lumbar spondylosis, for which she has been working with Dr. Dee and Dr. Xiao.  She had L4-L5 laminectomy and discectomy with pedicle screw fixation several years ago for spondylolisthesis with herniated disc. She had an L3- L4 lumbar laminectomy 1/17 for recurrent back and leg pain.   She also has spinal cord stimulator and a pain pump.   She  takes as duloxetine 60 mg 2x daily.   She takes Percocet 5-325 milligrams twice daily for pain.  She also takes Dilaudid 4 mg daily  for breakthrough pain.    She last worked 1/17. She is now on Social Security disability.     Last eye exam was 3/24 (Dr. Mascorro). Includes OCT.  10-2 VF was done 9/24.     Labs September 2023: CBC normal, BMP normal except sodium 129, cholesterol 141, HDL 58, LDL 62, triglycerides 107, FAMILIA  "negative, FAMILIA panel negative, ESR 11, C3 and C4 normal, spot urine protein to creatinine ratio 0.06  Labs November 2023: BMP normal except sodium 129  Labs November 28, 2023: Cortrosyn stimulation test was normal with baseline cortisol level 4.4, 30-minute cortisol level of 20.9, 60-minute cortisol level 28.8.  Labs January 2025: TSH 0.39, CMP normal except sodium 132, CBC normal, hemoglobin A1c 5.7, cholesterol 179, HDL 65, LDL 89, triglycerides 119     EKG December 19, 2023: Normal sinus rhythm, QTc 428 milliseconds       ROS:  General: Denies fevers or chills.  CV: Denies chest pain or palpitations.  Denies leg edema.  Lungs: Denies coughing or shortness of breath.  Skin: Denies rashes or nodules.  MS: Denies joint pain or joint swelling.     Objective   BP 95/62 (BP Location: Left arm, Patient Position: Sitting, BP Cuff Size: Adult)   Pulse 68   Temp 36.4 °C (97.5 °F) (Skin)   Resp 16   Ht 1.6 m (5' 3\")   Wt 84.9 kg (187 lb 3.2 oz)   SpO2 99%   BMI 33.16 kg/m²     Physical Exam  General Appearance: Well-nourished and well-appearing.  HEENT: PERRL, EOMI  Neck: Supple, no nodes.  CV: RRR, no MGR.  Lungs: Clear, no rales or wheezes.  Abdomen: Soft, nontender. No hepatosplenomegaly.  Extremities:  No cyanosis, clubbing, or edema.  MS: No synovitis.    Skin: No rashes or nodules.    Assessment/Plan   Problem List Items Addressed This Visit             ICD-10-CM    Undifferentiated connective tissue disease (Multi) M35.9    Relevant Medications    hydroxychloroquine (Plaquenil) 200 mg tablet    Other Relevant Orders    Protein, Urine Random (Completed)    Creatinine, Urine Random    C3 Complement (Completed)    C4 Complement (Completed)    FAMILIA + FRANCISCO Panel (Completed)    Closed displaced fracture of fifth metatarsal bone of left foot S92.352A    Relevant Orders    XR DEXA bone density    BMI 33.0-33.9,adult - Primary Z68.33     Other Visit Diagnoses         Codes    Postmenopausal estrogen deficiency     Z78.0 "    Relevant Orders    XR DEXA bone density          1. Undifferentiated connective tissue disease- Stable on  mg daily.     2. Lumbar spondylosis- continue follow-up with Dr. Xiao. She is now on Social Security disability.  She has pain pump and spinal cord stimulator. She takes  hydromorphone 4 mg daily as needed for breakthrough pain, takes Percocet 5-325 mg twice daily.     3. BMI 33- improved. will continue to work on weight loss.    4.  Hyponatremia-sodium 132.  Cortrosyn stimulation test 12/23 was normal.    5.  Left fifth metatarsal fracture 10/24- s/p ORIF 11/14/24.  Currently in PT, partial weightbearing on her heel.  Recommend DEXA when she has recovered and is more mobile.     Plan:  Check spot urine protein to creatinine ratio.  Check FAMILIA with panel, C3,and C4.  Consider Rogaine for Women for hair loss.  Bone density ordered.  Follow-up in 6 months.

## 2025-01-08 ENCOUNTER — HOSPITAL ENCOUNTER (OUTPATIENT)
Dept: PAIN MEDICINE | Facility: CLINIC | Age: 55
Discharge: HOME | End: 2025-01-08
Payer: MEDICARE

## 2025-01-08 VITALS
OXYGEN SATURATION: 97 % | HEART RATE: 81 BPM | DIASTOLIC BLOOD PRESSURE: 72 MMHG | SYSTOLIC BLOOD PRESSURE: 133 MMHG | RESPIRATION RATE: 18 BRPM

## 2025-01-08 DIAGNOSIS — G89.29 CHRONIC MIDLINE LOW BACK PAIN WITH LEFT-SIDED SCIATICA: ICD-10-CM

## 2025-01-08 DIAGNOSIS — M54.42 CHRONIC MIDLINE LOW BACK PAIN WITH LEFT-SIDED SCIATICA: ICD-10-CM

## 2025-01-08 DIAGNOSIS — M96.1 POSTLAMINECTOMY SYNDROME OF LUMBAR REGION: ICD-10-CM

## 2025-01-08 LAB
ANA PATTERN: ABNORMAL
ANA SER QL HEP2 SUBST: POSITIVE
ANA TITR SER IF: ABNORMAL {TITER}
C3 SERPL-MCNC: 163 MG/DL (ref 87–200)
C4 SERPL-MCNC: 26 MG/DL (ref 10–50)
CENTROMERE B AB SER-ACNC: <0.2 AI
CHROMATIN AB SERPL-ACNC: <0.2 AI
CREAT UR-MCNC: 125.2 MG/DL (ref 20–320)
DSDNA AB SER-ACNC: <1 IU/ML
ENA JO1 AB SER QL IA: <0.2 AI
ENA RNP AB SER IA-ACNC: <0.2 AI
ENA SCL70 AB SER QL IA: <0.2 AI
ENA SM AB SER IA-ACNC: <0.2 AI
ENA SM+RNP AB SER QL IA: <0.2 AI
ENA SS-A AB SER IA-ACNC: <0.2 AI
ENA SS-B AB SER IA-ACNC: <0.2 AI
PROT UR-ACNC: 14 MG/DL (ref 5–24)
PROT/CREAT UR: 0.11 MG/MG CREAT (ref 0–0.17)
RIBOSOMAL P AB SER-ACNC: <0.2 AI

## 2025-01-08 PROCEDURE — 62370 ANL SP INF PMP W/MDREPRG&FIL: CPT | Performed by: PAIN MEDICINE

## 2025-01-08 PROCEDURE — C1894 INTRO/SHEATH, NON-LASER: HCPCS

## 2025-01-08 PROCEDURE — 2720000007 HC OR 272 NO HCPCS

## 2025-01-08 NOTE — DISCHARGE INSTRUCTIONS
INTRATHECAL PAIN PUMP REFILL DISCHARGE INSTRUCTIONS        DO NOT GET INJECTION SITE WET FOR 24 HOURS  IF BANDAGE HAS BEEN PLACED YOU MAY REMOVE AFTER 24 HOURS  MONITOR FOR SIGNS/SYMPTOMS OF INFECTION:FEVERS REDNESS OR INCREASED PAIN AT INJECTION SITE   YOU MAY RESUME YOUR NORMAL ACTIVITY       IF SIGNS OR SYMPTOMS OF OPIATE OVERDOSE ARE NOTED AFTER YOUR REFILL INCLUDING UNUSUAL SLEEPINESS, UNRESPONSIVENESS, SLOW OR ABSENT BREATHING ADMINISTER NARCAN AS DIRECTED AND CALL 911    BEFORE YOU LEAVE OUR OFFICE PLEASE SCHEDULE YOUR NEXT APPOINTMENT TO SCHEDULE YOUR NEXT REFILL APPOINTMENT-IT IS IMPORTANT TO KEEP YOUR APPOINTMENTS SO THAT YOUR PUMP DOES NOT RUN OUT OF MEDICATION AS THIS WILL DAMAGE YOUR PUMP    SHOULD YOU HAVE ANY QUESTIONS OR CONCERNS PLEASE CALL THE OFFICE  [phone]

## 2025-01-10 ENCOUNTER — TREATMENT (OUTPATIENT)
Dept: PHYSICAL THERAPY | Facility: CLINIC | Age: 55
End: 2025-01-10
Payer: MEDICARE

## 2025-01-10 DIAGNOSIS — S92.352A CLOSED DISPLACED FRACTURE OF FIFTH METATARSAL BONE OF LEFT FOOT, INITIAL ENCOUNTER: ICD-10-CM

## 2025-01-10 PROCEDURE — 97140 MANUAL THERAPY 1/> REGIONS: CPT | Mod: GP | Performed by: PHYSICAL THERAPIST

## 2025-01-10 PROCEDURE — 97112 NEUROMUSCULAR REEDUCATION: CPT | Mod: GP | Performed by: PHYSICAL THERAPIST

## 2025-01-10 PROCEDURE — 97110 THERAPEUTIC EXERCISES: CPT | Mod: GP | Performed by: PHYSICAL THERAPIST

## 2025-01-10 NOTE — PROGRESS NOTES
Physical Therapy Treatment      Patient Name: Samina Celeste  MRN: [id number]  Today's Date: 1/10/2025    Time Entry: Start time: 322            End time: 409     Reason for Visit  Referred Dx:Closed displaced fracture of fifth metatarsal bone of left foot, initial encounter [Z41.734N]  Referred By: Sravani Cabrales MD   Insurance Information  Visit #: 2  Approved Visits: MED NEC VISITS   Auth required:  AUTH REQ   Insurance:BRIANNA   : verified with patient  DOS:2024     Current problem: S/p Closed displaced fracture of fifth metatarsal bone of left foot, initial encounter [F27.690H]    Subjective    The patient states she feels more flexible around the foot after exs at home. However, she feels the same sensitivity, and it hurts.   Precautions  STEADI Fall Risk Score (The score of 4 or more indicates an increased risk of falling): 0  Precautions Comment: mod - high fall risk  Pain  At present: 3/10  At Best: 2/10  At Worst: 5/10  Aggravating Factors: moving the foot in the boot , touch the foot or try to put the weight on L foot  Relieving factors: elevating the foot, pain meds    Objective   Today's finding:   TTP: around the scar, dorsum of foot (3- 5 metatarsals) L side  Spasm/Tightness: Achillis, toe flexors L side  Scar: healing mobile sx scar parallel to the 5 the metatarsal with dry scalyskin  Swelling: Mild swelling and raised temp around the scar  ROM:  Knee ROM: wfl              Flexion:R: wfl L:wfl               Extension: R:5* L: 5*   Ankle ROM:              DF: R:10* L: -7*              PF: R: 45 L: -7* - 45              Inv: R:40 L: 26              EV: R: 15 L: 7  MMT:  Ankle MMT:  DF: R:4+/5 L:3+/5 (painful)              PF: R:4+/5 L:3/5 (painful)              Inv: R:4+/5 L: 3+/5 (painful)              Ev: R:4+/5 L:3/5  (painful)  Knee MMT:              Flex: R:4+/5 L:4/5              Ext: R:/5 L:4/5  Gait Analysis: Patient walked to the clinic non weight bearing using a scooter with  the cam boot on L leg.   Treatment Performed:   Therapeutic Exercise:    Band strengthening DF, PF , INV , EV  Ankle ABC - 1 x 10 reps, hold 3s  Towel curls- 1 x 10 reps, hold 3s  Towel slides - 1 x 10 reps, hold 3s  Heel and toe raises- seated- 1 x 10 reps, hold 3s   MTP flex- ext- 1 x 10 reps, hold 3s   Manual Therapy:   IASTM Scar mob/ Desensitize   Calf stretch - 1 x 3 reps, hold 20s  Neuromuscular Re-education:   Walking - FWW training with boot - 20- 25ft  Charges today:  Ther Ex: 15 m  Manual therapy: 20m  Neuromuscular elisabeth: 10 m  Assessment:  The patient arrived at the clinic non-weight bearing (NWB), with the affected left leg supported on a knee scooter and secured in a CAM boot. The patient is 3 weeks and 4 days post-op (s/p) for 5th metatarsal (MTP) surgery on the left foot. Presenting impairments include limited foot and ankle joint mobility, reduced muscle flexibility, weakness, and gait deviations related to the recent surgical procedure. The patient reported hypersensitivity to touch and persistent pain around the surgical scar, which is significantly impacting daily activities. Education was provided regarding the hypersensitivity, explaining that it may be associated with the scar-healing process. Desensitization techniques, including crude touch with the hand and a towel, were applied to the painful area. The patient reported improvement in pain symptoms with sustained maneuvers, except for the area directly over the scar, which remained hypersensitive. The patient was instructed to continue these desensitization techniques at home to further alleviate pain sensitivity. Joint mobilization techniques targeting the mid-tarsal and MTP joints were performed to enhance mobility in the midfoot and forefoot. Achillis stretching using a belt was introduced in a sitting position and tolerated well.Weight-bearing training was initiated with the patient wearing the CAM boot. Lateral, forward, and backward  weight-shifting perturbation exercises were performed with a front-wheeled walker (FWW). The patient demonstrated good balance and was successfully trained to walk with the FWW in the CAM boot. Patient tolerated walking 15 ft, reported increase discomfort and fatigue after that with need to take a rest. The patient reported feeling significantly better walking after a long period of non-weight-bearing, with no increase in pain. Ankle strengthening exercises using a yellow TheraBand (YTB) were introduced. The patient demonstrated fatigue after 8-9 repetitions and required minimal to moderate cues for maintaining proper biomechanical positioning of the ankle during exercises.  The treatment plan will continue to focus on improving ankle and foot mobility and strength to achieve the patient’s established rehabilitation goals.   Plan:  The patient will benefit from a skilled PT to improve foot -ankle joint mobility, strength and flexibility and balance facilitate independent transition, and transfers confidently at home and in the community.     HEP:   Scar/Incision Desensitization   - Use cotton ball/tissue to brush lightly over scar/incision for desensitization.   - Repeat 15 Times Hold 20 Seconds Complete 3 Sets Perform 2 Times a Day   ELASTIC BAND EVERSION - SEATED   - Using an elastic band attached to your foot, hook it under your opposite foot and up to your hand. Next, draw the band outwards to the side. Be sure to keep your heel in contact with the floor the entire time. - Repeat 10 Times Hold 3 Seconds Complete 2 Sets Perform 2 Times a Day   ELASTIC BAND PLANTARFLEXION - SUPINE   - You can perform this lying on the floor face-up. [place] one end of the elastic band in your hand and place a looped end around your target foot. Next, hold the band and pull it to provide some tension in the band. Then move your target ankle/foot forward, or plantarflex your ankle. This is the same motion as when pressing down on a  gas pedal of a car. Return to starting position and repeat.   - Repeat 10 Times Hold 3 Seconds Complete 2 Sets Perform 2 Times a Day    Cont. Previous ex in addition to today's updated HEP, as tolerated.

## 2025-01-14 ENCOUNTER — APPOINTMENT (OUTPATIENT)
Dept: PHYSICAL THERAPY | Facility: CLINIC | Age: 55
End: 2025-01-14
Payer: MEDICARE

## 2025-01-17 ENCOUNTER — TREATMENT (OUTPATIENT)
Dept: PHYSICAL THERAPY | Facility: CLINIC | Age: 55
End: 2025-01-17
Payer: MEDICARE

## 2025-01-17 DIAGNOSIS — S92.352A CLOSED DISPLACED FRACTURE OF FIFTH METATARSAL BONE OF LEFT FOOT, INITIAL ENCOUNTER: ICD-10-CM

## 2025-01-17 PROCEDURE — 97112 NEUROMUSCULAR REEDUCATION: CPT | Mod: GP | Performed by: PHYSICAL THERAPIST

## 2025-01-17 PROCEDURE — 97110 THERAPEUTIC EXERCISES: CPT | Mod: GP | Performed by: PHYSICAL THERAPIST

## 2025-01-17 PROCEDURE — 97140 MANUAL THERAPY 1/> REGIONS: CPT | Mod: GP | Performed by: PHYSICAL THERAPIST

## 2025-01-17 NOTE — PROGRESS NOTES
Physical Therapy Treatment      Patient Name: Samina Celeste  MRN: [id number]  Today's Date: 2025    Time Entry: Start time: 149            End time: 235    Reason for Visit  Referred Dx:Closed displaced fracture of fifth metatarsal bone of left foot, initial encounter [X22.956A]  Referred By: Sravani Cabrales MD   Insurance Information  Visit #: 3  Approved Visits: MED NEC VISITS   Auth required:  AUTH REQ   Insurance:BRIANNA DUONGB: verified with patient  DOS:2024     Current problem: S/p Closed displaced fracture of fifth metatarsal bone of left foot, initial encounter [D09.856U]    Subjective    The patient states that she feels like pins and needle tingling in her L foot around the toes on L foot. Patient states that she doesn't feel numbness, it constantly hurts for some time. Patient states that she though it could have something to do with the spinal stimulator but she turned it off for safe side to check and didn't feel a difference so turned it back on. The patient states the compliance with HEP and feels like it is helping her.   Precautions  STEADI Fall Risk Score (The score of 4 or more indicates an increased risk of falling): 0  Precautions Comment: mod - high fall risk  Pain  At present: 3/10  At Best: 2/10  At Worst: 5/10  Aggravating Factors: moving the foot in the boot , touch the foot or try to put the weight on L foot  Relieving factors: elevating the foot, pain meds    Objective   Today's finding:   TTP: around the scar, dorsum of foot (3- 5 metatarsals) L side  Spasm/Tightness: Achillis, toe flexors L side  Scar: healing mobile sx scar parallel to the 5 the metatarsal with dry scalyskin  Swelling: Mild swelling and raised temp around the scar  ROM:  Knee ROM: wfl              Flexion:R: wfl L:wfl               Extension: R:-5* L: -5*   Ankle ROM:              DF: R:10* L: 0*              PF: R: 50  L: -7* - 50              Inv: R:40 L: 26              EV: R: 15 L: 7  MMT:  Ankle  MMT:  DF: R:4+/5 L:4-/5 (painful)              PF: R:4+/5 L:3/5 (painful)              Inv: R:4+/5 L: 3+/5 (painful)              Ev: R:4+/5 L:3/5  (painful)  Knee MMT:              Flex: R:4+/5 L:4/5              Ext: R:/5 L:4/5  Gait Analysis: Patient walked to the clinic non weight bearing using a scooter with the cam boot on L leg.   Treatment Performed:   Therapeutic Exercise:    Band strengthening DF, PF , INV , EV  Ankle ABC - 1 x 10 reps, hold 3s  Towel curls- 1 x 10 reps, hold 3s  Towel slides - 1 x 10 reps, hold 3s  Heel and toe raises- seated- 1 x 10 reps, hold 3s   MTP flex- ext- 1 x 10 reps, hold 3s   Manual Therapy:   IASTM Scar mob/  Desensitize scar  Calf stretch - 1 x 3 reps, hold 20s  Neuromuscular Re-education:   Walking - FWW training with boot - 90 ft  Charges today:  Ther Ex: 20 m  Manual therapy: 15 m  Neuromuscular elisabeth: 10 m  Assessment:  The patient arrived at the clinic non-weight bearing (NWB), with the affected left leg supported on a knee scooter and secured in a CAM boot. The patient is 9 weeks s/p for 5th metatarsal sx. The patient shows progress and improvement in L ankle mobility with DF improved to 0* and PF to 50* on LLE. The patient shows improvement in ankle DF, PF MMT to 4/5 LLE. Patient cont with desensitization techniques, including crude touch with the hand and a towel, were applied to the painful area. The patient reported improvement in pain symptoms with sustained maneuvers, except for the area directly over the scar, which remained hypersensitive. The patient continued strengthening and progressed with RTB for ankle DF and inv/ ev on LLE. The patient shows good control performing heel and toe raises in a seated position with minimal directions required for biomechanical corrections. The patient shows progress with confidence and an increase in walking distance to approximately 90 feet with SBA with no pain with the CAM boot on. The treatment plan will continue to focus  on improving ankle and foot mobility and strength to achieve the patient’s established rehabilitation goals.   Plan:  The patient will benefit from a skilled PT to improve foot -ankle joint mobility, strength and flexibility and balance facilitate independent transition, and transfers confidently at home and in the community.     HEP:   Scar/Incision Desensitization   - Use cotton ball/tissue to brush lightly over scar/incision for desensitization.   - Repeat 15 Times Hold 20 Seconds Complete 3 Sets Perform 2 Times a Day   ELASTIC BAND EVERSION - SEATED   - Using an elastic band attached to your foot, hook it under your opposite foot and up to your hand. Next, draw the band outwards to the side. Be sure to keep your heel in contact with the floor the entire time. - Repeat 10 Times Hold 3 Seconds Complete 2 Sets Perform 2 Times a Day   ELASTIC BAND PLANTARFLEXION - SUPINE   - You can perform this lying on the floor face-up. [place] one end of the elastic band in your hand and place a looped end around your target foot. Next, hold the band and pull it to provide some tension in the band. Then move your target ankle/foot forward, or plantarflex your ankle. This is the same motion as when pressing down on a gas pedal of a car. Return to starting position and repeat.   - Repeat 10 Times Hold 3 Seconds Complete 2 Sets Perform 2 Times a Day    Cont. Previous ex in addition to today's updated HEP, as tolerated.

## 2025-01-21 ENCOUNTER — APPOINTMENT (OUTPATIENT)
Dept: PHYSICAL THERAPY | Facility: CLINIC | Age: 55
End: 2025-01-21
Payer: MEDICARE

## 2025-01-24 ENCOUNTER — TREATMENT (OUTPATIENT)
Dept: PHYSICAL THERAPY | Facility: CLINIC | Age: 55
End: 2025-01-24
Payer: MEDICARE

## 2025-01-24 DIAGNOSIS — S92.352A CLOSED DISPLACED FRACTURE OF FIFTH METATARSAL BONE OF LEFT FOOT, INITIAL ENCOUNTER: ICD-10-CM

## 2025-01-24 PROCEDURE — 97110 THERAPEUTIC EXERCISES: CPT | Mod: GP | Performed by: PHYSICAL THERAPIST

## 2025-01-24 PROCEDURE — 97112 NEUROMUSCULAR REEDUCATION: CPT | Mod: GP | Performed by: PHYSICAL THERAPIST

## 2025-01-24 PROCEDURE — 97140 MANUAL THERAPY 1/> REGIONS: CPT | Mod: GP | Performed by: PHYSICAL THERAPIST

## 2025-01-24 NOTE — PROGRESS NOTES
Physical Therapy Treatment      Patient Name: Samina Celeste  MRN: [id number]  Today's Date: 2025    Time Entry: Start time: 149            End time: 233    Reason for Visit  Referred Dx:Closed displaced fracture of fifth metatarsal bone of left foot, initial encounter [B07.500A]  Referred By: Sravani Cabrales MD   Insurance Information  Visit #: 4  Approved Visits: MED NEC VISITS   Auth required:  AUTH REQ   Insurance:BRIANNA   : verified with patient  DOS:2024     Current problem: S/p Closed displaced fracture of fifth metatarsal bone of left foot, initial encounter [N92.655E]    Subjective    Thw patient states the compliance with the HEP with no increase in pain or discomfort.   Precautions  STEADI Fall Risk Score (The score of 4 or more indicates an increased risk of falling): 0  Precautions Comment: mod - high fall risk  Pain  At present: 2/10  At Best: 2/10  At Worst: 5/10  Aggravating Factors: moving the foot in the boot , touch the foot or try to put the weight on L foot  Relieving factors: elevating the foot, pain meds    Objective   Today's finding:   TTP: around the scar, dorsum of foot (3- 5 metatarsals) L side  Spasm/Tightness: Achillis, toe flexors L side  Scar: healing mobile sx scar parallel to the 5 the metatarsal with dry scalyskin  Swelling: Mild swelling and raised temp around the scar  ROM:  Knee ROM: wfl              Flexion:R: wfl L:wfl               Extension: R:-5* L: -5*   Ankle ROM:              DF: R:10* L: -5*              PF: R: 50  L: -7* - 50              Inv: R:40 L: 26              EV: R: 15 L: 7  MMT:  Ankle MMT:  DF: R:4+/5 L:4-/5 (painful)              PF: R:4+/5 L:3/5 (painful)              Inv: R:4+/5 L: 3+/5 (painful)              Ev: R:4+/5 L:3/5  (painful)  Knee MMT:              Flex: R:4+/5 L:4/5              Ext: R:/5 L:4/5  Gait Analysis: Patient walked to the clinic non weight bearing using a scooter with the cam boot on L leg.   Treatment Performed:    Therapeutic Exercise:    Band strengthening DF, PF , INV , EV  Ankle ABC - 1 x 10 reps, hold 3s  Heel and toe raises- seated- 1 x 10 reps, hold 3s   MTP flex- ext- 1 x 10 reps, hold 3s   Manual Therapy:   IASTM Scar mob/  Desensitize scar  Gr II, III s  sub talar and talotalar mob  Subtalar joint distraction  Calf stretch - 1 x 3 reps, hold 20s  Neuromuscular Re-education:   Walking - FWW training with boot - 90 ft  Charges today:  Ther Ex: 15 m  Manual therapy: 20 m  Neuromuscular elisabeth: 10 m  Assessment:  The patient present with improvement in pain sensation on dorsum of the foot, however feels sharp pain sensation to touch on and around the scar.IASTM and densensitization performed to improve pain. 5* decreased ankle DF ROM noted compared to last week,, subtalar joint mob, mid tarsal and inter tarsal mob performed followed by achillis stretch in a seated position, showed improvement with ankle DF to 0*. Ankle ms strengthening progressed with GTB. Patient performed and responded well to the treatment today. Standing and walking with the walker progressed to 120- 130 ft performed today. Patient walked confidently in a boot with the walker with wt bearing on heel. The patient has a follow up visit with the physician next week. Follow up PT cont per protocol to progress as tolerated by the patient to focus on improving ankle and foot mobility and strength to achieve the patient’s established rehabilitation goals.   Plan:  The patient will benefit from a skilled PT to improve foot -ankle joint mobility, strength and flexibility and balance facilitate independent transition, and transfers confidently at home and in the community.     HEP:   Scar/Incision Desensitization   - Use cotton ball/tissue to brush lightly over scar/incision for desensitization.   - Repeat 15 Times Hold 20 Seconds Complete 3 Sets Perform 2 Times a Day   ELASTIC BAND EVERSION - SEATED   - Using an elastic band attached to your foot, hook it  under your opposite foot and up to your hand. Next, draw the band outwards to the side. Be sure to keep your heel in contact with the floor the entire time. - Repeat 10 Times Hold 3 Seconds Complete 2 Sets Perform 2 Times a Day   ELASTIC BAND PLANTARFLEXION - SUPINE   - You can perform this lying on the floor face-up. [place] one end of the elastic band in your hand and place a looped end around your target foot. Next, hold the band and pull it to provide some tension in the band. Then move your target ankle/foot forward, or plantarflex your ankle. This is the same motion as when pressing down on a gas pedal of a car. Return to starting position and repeat.   - Repeat 10 Times Hold 3 Seconds Complete 2 Sets Perform 2 Times a Day    Cont. Previous ex in addition to today's updated HEP, as tolerated.

## 2025-01-28 ENCOUNTER — HOSPITAL ENCOUNTER (OUTPATIENT)
Dept: RADIOLOGY | Facility: CLINIC | Age: 55
Discharge: HOME | End: 2025-01-28
Payer: MEDICARE

## 2025-01-28 ENCOUNTER — TREATMENT (OUTPATIENT)
Dept: PHYSICAL THERAPY | Facility: CLINIC | Age: 55
End: 2025-01-28
Payer: MEDICARE

## 2025-01-28 ENCOUNTER — TELEPHONE (OUTPATIENT)
Dept: PRIMARY CARE | Facility: CLINIC | Age: 55
End: 2025-01-28
Payer: MEDICARE

## 2025-01-28 ENCOUNTER — OFFICE VISIT (OUTPATIENT)
Dept: ORTHOPEDIC SURGERY | Facility: CLINIC | Age: 55
End: 2025-01-28
Payer: MEDICARE

## 2025-01-28 DIAGNOSIS — S92.352A CLOSED DISPLACED FRACTURE OF FIFTH METATARSAL BONE OF LEFT FOOT, INITIAL ENCOUNTER: ICD-10-CM

## 2025-01-28 DIAGNOSIS — S92.352D CLOSED FRACTURE OF FIFTH METATARSAL BONE OF LEFT FOOT WITH ROUTINE HEALING, SUBSEQUENT ENCOUNTER: Primary | ICD-10-CM

## 2025-01-28 DIAGNOSIS — E03.9 HYPOTHYROIDISM, UNSPECIFIED TYPE: ICD-10-CM

## 2025-01-28 PROCEDURE — 97112 NEUROMUSCULAR REEDUCATION: CPT | Mod: GP | Performed by: PHYSICAL THERAPIST

## 2025-01-28 PROCEDURE — 99211 OFF/OP EST MAY X REQ PHY/QHP: CPT | Performed by: ORTHOPAEDIC SURGERY

## 2025-01-28 PROCEDURE — 73630 X-RAY EXAM OF FOOT: CPT | Mod: LEFT SIDE | Performed by: RADIOLOGY

## 2025-01-28 PROCEDURE — 97140 MANUAL THERAPY 1/> REGIONS: CPT | Mod: GP | Performed by: PHYSICAL THERAPIST

## 2025-01-28 PROCEDURE — 97110 THERAPEUTIC EXERCISES: CPT | Mod: GP | Performed by: PHYSICAL THERAPIST

## 2025-01-28 PROCEDURE — 73630 X-RAY EXAM OF FOOT: CPT | Mod: LT

## 2025-01-28 RX ORDER — LEVOTHYROXINE SODIUM 25 UG/1
25 TABLET ORAL DAILY
Qty: 30 TABLET | Refills: 3 | Status: SHIPPED | OUTPATIENT
Start: 2025-01-28 | End: 2025-05-28

## 2025-01-28 NOTE — PROGRESS NOTES
This is a pleasant 54 y.o. year old female who presents for fuv of left foot.  She is doing well with PT, no pain, using knee walker and boot.  Using walker also at PT and at home, WBAT    PHYSICAL EXAMINATION  Constitutional Exam: patient's height and weight reviewed, well-kempt  Psychiatric Exam: alert and oriented x 3, appropriate mood and behavior  Eye Exam: MANE, EOMI  Pulmonary Exam: breathing non-labored, no apparent distress  Lymphatic exam: no appreciable lymphadenopathy in the lower extremities  Cardiovascular exam: DP pulses 2+ bilaterally, PT 2+ bilaterally, toes are pink with good capillary refill, no pitting edema  Skin exam: no open lesions, rashes, abrasions or ulcerations  Neurological exam: sensation to light touch intact in both lower extremities in peripheral and dermatomal distributions (except for any abnormalities noted in musculoskeletal exam)    Musculoskeletal exam: left foot: 5th metatarsal nontender to palpation, good ROM 5th MTP joint, motor strength 5/5 DF/PF/IV/EV    DATA/RESULTS REVIEW: I personally reviewed the patient's x-ray images and reports of the left foot showing healed 5th MT shaft and neck fracture with hardware in appropriate position.     ASSESSMENT: healed 5th metatarsal neck and shaft fracture left foot  PLAN: Further treatment options discussed including finishing up PT, can wean out of boot into athletic shoe, updated PT order, can ride stationary bike and then elliptical out of boot.  FUV in 8 weeks or so for recheck.  The patient's questions were answered in detail.      Note dictated with [employer] software, completed without full type editing to avoid delay.

## 2025-01-28 NOTE — PROGRESS NOTES
Physical Therapy Treatment      Patient Name: Samina Celeste  MRN: [id number]  Today's Date: 2025    Time Entry: Start time: 246             End time: 316    Reason for Visit  Referred Dx:Closed displaced fracture of fifth metatarsal bone of left foot, initial encounter [R22.057P]  Referred By: Sravani Cabrales MD   Insurance Information  Visit #: 5  Approved Visits: MED NEC VISITS   Auth required:  AUTH REQ   Insurance:BRIANNA   : verified with patient  DOS:2024     Current problem: S/p Closed displaced fracture of fifth metatarsal bone of left foot, initial encounter [K64.278M]    Subjective    The patient reports that she had a follow up visit with surgeon today. Patient states that doctor allowed her to walk on foot in a boot couple of days and then change to tennis shoes.   (Note the patient arrived 15 m late due to doctor's appointment)   Precautions  STEADI Fall Risk Score (The score of 4 or more indicates an increased risk of falling): 0  Precautions Comment: Mod - high fall risk  Pain  At present: 2/10  At Best: 2/10  At Worst: 5/10  Aggravating Factors: moving the foot in the boot , touch the foot or try to put the weight on L foot  Relieving factors: elevating the foot, pain meds    Objective   Today's finding:   TTP: around the scar, dorsum of foot (3- 5 metatarsals) L side  Spasm/Tightness: Achillis, toe flexors L side  Scar: healing mobile sx scar parallel to the 5 the metatarsal with dry scalyskin  Swelling: Mild swelling and raised temp around the scar  ROM:  Knee ROM: wfl              Flexion:R: wfl L:wfl               Extension: R:-5* L: -5*   Ankle ROM:              DF: R:10* L: -5*              PF: R: 50  L: -7* - 50              Inv: R:40 L: 26              EV: R: 15 L: 7  MMT:  Ankle MMT:  DF: R:4+/5 L:4-/5 (painful)              PF: R:4+/5 L:3/5 (painful)              Inv: R:4+/5 L: 3+/5 (painful)              Ev: R:4+/5 L:3/5  (painful)  Knee MMT:              Flex: R:4+/5  L:4/5              Ext: R:/5 L:4/5  Gait Analysis: Patient walked to the clinic non weight bearing using a scooter with the cam boot on L leg.   Treatment Performed:   Therapeutic Exercise:    Band strengthening DF, PF , INV , EV  Heel and toe raises- seated- 1 x 10 reps, hold 3s   MTP flex- ext- 1 x 10 reps, hold 3s   Manual Therapy:   Desensitize scar  Gr II, III mob mid tarsals and sub talar talotibial jt  Subtalar joint distraction  Calf stretch - 1 x 3 reps, hold 20s  Neuromuscular Re-education:   Walking - FWW training with boot - 120 ft  Charges today:  Ther Ex: 10 m  Manual therapy: 10 m  Neuromuscular elisabeth: 10 m  Assessment:  The patient demonstrated decreased ankle dorsiflexion (DF) to -5 today. Manual therapy was performed using Grade II and III mobilizations and joint distraction at the subtalar joint to improve joint mobility, as tolerated by the patient. Improvement in ankle DF post mob by 7* (2 - 50*) measured. An Achilles stretch was also completed in a seated position. The patient reported no pain during today's visit and walked into physical therapy using a walker and a boot, showing an improved gait pattern. The patient was cleared by the orthopedic doctor to walk with full weight-bearing (FWB) as tolerated. After the walk, the patient exhibited some fatigue but reported no increase in pain or discomfort. In the follow-up session, the patient will be assessed for functional performance, including squats and standing balance while wearing tennis shoes. Further physical therapy will be determined based on the examination, with sessions scheduled for 1 to 2 times per week. The patient is advised to gradually transition to tennis shoes as directed by the doctor and to practice standing as tolerated.  Plan:  The patient will benefit from a skilled PT to improve foot -ankle joint mobility, strength and flexibility and balance facilitate independent transition, and transfers confidently at home and in  the community.     HEP:   Scar/Incision Desensitization   - Use cotton ball/tissue to brush lightly over scar/incision for desensitization.   - Repeat 15 Times Hold 20 Seconds Complete 3 Sets Perform 2 Times a Day   ELASTIC BAND EVERSION - SEATED   - Using an elastic band attached to your foot, hook it under your opposite foot and up to your hand. Next, draw the band outwards to the side. Be sure to keep your heel in contact with the floor the entire time. - Repeat 10 Times Hold 3 Seconds Complete 2 Sets Perform 2 Times a Day   ELASTIC BAND PLANTARFLEXION - SUPINE   - You can perform this lying on the floor face-up. [place] one end of the elastic band in your hand and place a looped end around your target foot. Next, hold the band and pull it to provide some tension in the band. Then move your target ankle/foot forward, or plantarflex your ankle. This is the same motion as when pressing down on a gas pedal of a car. Return to starting position and repeat.   - Repeat 10 Times Hold 3 Seconds Complete 2 Sets Perform 2 Times a Day    Cont. Previous ex in addition to today's updated HEP, as tolerated.

## 2025-01-28 NOTE — TELEPHONE ENCOUNTER
Pt is calling for a refill on her Levothyroxine 25 MCG. Pt states that you change how often she takes the medication and wanted her to do every other day. The script was canceled and a new one was not sent.     MEDICATION:     Levothyroxine 25 MCG; Take 1 tablet every other day in the morning either 30 to 60 minutes prior to breakfast.     PHARM: [facility]  PHARM NUMBER: [phone]

## 2025-01-31 ENCOUNTER — TREATMENT (OUTPATIENT)
Dept: PHYSICAL THERAPY | Facility: CLINIC | Age: 55
End: 2025-01-31
Payer: MEDICARE

## 2025-01-31 DIAGNOSIS — S92.352A CLOSED DISPLACED FRACTURE OF FIFTH METATARSAL BONE OF LEFT FOOT, INITIAL ENCOUNTER: ICD-10-CM

## 2025-01-31 PROCEDURE — 97112 NEUROMUSCULAR REEDUCATION: CPT | Mod: GP | Performed by: PHYSICAL THERAPIST

## 2025-01-31 PROCEDURE — 97110 THERAPEUTIC EXERCISES: CPT | Mod: GP | Performed by: PHYSICAL THERAPIST

## 2025-01-31 PROCEDURE — 97140 MANUAL THERAPY 1/> REGIONS: CPT | Mod: GP | Performed by: PHYSICAL THERAPIST

## 2025-01-31 NOTE — PROGRESS NOTES
Physical Therapy Treatment      Patient Name: Samina Celeste  MRN: [id number]  Today's Date: 2025    Time Entry: Start time: 233             End time: 321    Reason for Visit  Referred Dx:Closed displaced fracture of fifth metatarsal bone of left foot, initial encounter [W03.266A]  Referred By: Sravani Cabrales MD   Insurance Information  Visit #: 6  Approved Visits: MED NEC VISITS   Auth required:  AUTH REQ   Insurance:BRIANNA   : verified with patient  DOS:2024     Current problem: S/p Closed displaced fracture of fifth metatarsal bone of left foot, initial encounter [F01.825W]    Subjective    The patient reports the compliance with HEP and started walking with walker and stopped using scooter at home in a CAM boot. Patient states that shee feels more sensitivity in the top of her foot at rest and during walking, patient thinks it could be because of cont use of the CAM boot.   Precautions  STEADI Fall Risk Score (The score of 4 or more indicates an increased risk of falling): 0  Precautions Comment: Mod - high fall risk  Pain  At present: 2/10  At Best: 2/10  At Worst: 3/10  Aggravating Factors: moving the foot in the boot , touch the foot or try to put the weight on L foot  Relieving factors: elevating the foot, pain meds    Objective   Today's finding:   TTP: around the scar, dorsum of foot (3- 5 metatarsals) L side  Spasm/Tightness: Achillis, toe flexors L side  Scar: healing mobile sx scar parallel to the 5 the metatarsal with dry scalyskin  Swelling: Mild swelling and raised temp around the scar  ROM:  Knee ROM: wfl              Flexion:R: wfl L:wfl               Extension: R:-5* L: -5*   Ankle ROM:              DF: R:10* L: 2*              PF: R: 50  L: 2* - 50*              Inv: R:40 L: 26              EV: R: 15 L: 7  MMT:  Ankle MMT:  DF: R:4+/5 L:4-/5 (painful)              PF: R:4+/5 L:3/5 (painful)              Inv: R:4+/5 L: 3+/5 (painful)              Ev: R:4+/5 L:3/5   (painful)  Knee MMT:              Flex: R:4+/5 L:4/5              Ext: R:/5 L:4/5  Gait Analysis: Patient walked to the clinic non weight bearing using a scooter with the cam boot on L leg.   Treatment Performed:   Therapeutic Exercise:    Recumbent bike - level 1.2 - 5m  Hell raises - BLE - 1 x 10 reps  SL Heel raises - RLE (could not do it on LLE - sharp shooting pain)  BLE leg press - TG - level 5 - 2 x 10 reps  SL Leg press - TG -  level 5 - 1 x 10 reps  Manual Therapy:   Achillis stretch -standing - stepper- 1 x 3 reps, hold 20s  Desensitize scar- IASTM   Gr II, III mob mid tarsals and sub talar talotibial jt  Subtalar joint distraction  Mid tarsals and meta tarsals PA/ AP - Gr I, II glide  Neuromuscular Re-education:   Balance board - fwd/ bwd and lateral  Tandem stance - BLE - 1 x 3 reps, hold 15s  SLS - BLE - 1 x 3 reps, hold 15s  Charges today:  Ther Ex: 20 m  Manual therapy: 15m  Neuromuscular elisabeth: 12 m  Assessment:  The patient presented ambulating with a walker while wearing a CAM boot. She reported stiffness in her foot but denied any pain. Manual therapy was initiated to improve joint play and accessory motion at the talotibial and subtalar joints of the left ankle. Following mobilization and joint distraction techniques, the patient demonstrated increased mobility and joint play, with a reported decrease in stiffness. The patient brought a tennis shoe for the left foot. A trial was conducted to transition from the CAM boot to the tennis shoe. Initial standing balance and side-to-side weight shifts were introduced, during which the patient reported feeling significantly better compared to wearing the CAM boot. Gait training was initiated with a single-point cane for the first few steps to ensure safety and facilitate balance. The patient experienced mild pain on the dorsal aspect of the left foot during the push-off phase of gait. The recumbent bike was introduced to activate bilateral lower extremity  musculature. Achilles and soleus stretching was progressed and initiated in a standing position on a foot stepper, with a 15-second hold or as tolerated. The patient reported mild pain on the dorsal aspect of the left foot along with calf fatigue during the third repetition. She was allowed to rest for a few seconds, which helped alleviate the discomfort. Static and dynamic balance activities were initiated, incorporating sideward and forward weight shifting on a tilt board. The patient noted a different sensation and increased effort from the hip and trunk while shifting weight to the left side. During single-leg stance (SLS), she was able to maintain balance on the right lower extremity for 17 seconds without pain or discomfort. However, she declined performing SLS on the left side due to sharp, shooting pain upon initiation. The patient will continue with physical therapy to improve ankle and foot joint accessory motion, strength, stability, and functional control to achieve the established goals safely and independently.  Plan:  The patient will benefit from a skilled PT to improve foot -ankle joint mobility, strength and flexibility and balance facilitate independent transition, and transfers confidently at home and in the community.     HEP:   Heel raises  - Heel raises Raise heels as high as comfortable.   - Repeat 2 sets of 15 repetitions. Repeat 10 Times Complete 2 Sets Hold 5 Seconds Perform 2 Times a Day   Plantar Fascia Stretch off of Step       - Stand on the bottom step with the toes of the involved foot on the step. Let your heel sink until you feel a stretch and hold. Do not stretch into pain.   - Repeat 3 Times Complete 2 Sets Hold 20 Seconds Perform 2 Times a Day  Single Leg Stance Balance   - With hands on hips, hold one leg in the air and balance on supporting foot for as long as possible. Repeat 15 Times - - Complete 1 Set Hold 20 Seconds Perform 2 Times a Day    Cont from previous  session:  Scar/Incision Desensitization   - Use cotton ball/tissue to brush lightly over scar/incision for desensitization.   - Repeat 15 Times Hold 20 Seconds Complete 3 Sets Perform 2 Times a Day   ELASTIC BAND EVERSION - SEATED   - Using an elastic band attached to your foot, hook it under your opposite foot and up to your hand. Next, draw the band outwards to the side. Be sure to keep your heel in contact with the floor the entire time. - Repeat 10 Times Hold 3 Seconds Complete 2 Sets Perform 2 Times a Day   ELASTIC BAND PLANTARFLEXION - SUPINE   - You can perform this lying on the floor face-up. [place] one end of the elastic band in your hand and place a looped end around your target foot. Next, hold the band and pull it to provide some tension in the band. Then move your target ankle/foot forward, or plantarflex your ankle. This is the same motion as when pressing down on a gas pedal of a car. Return to starting position and repeat.   - Repeat 10 Times Hold 3 Seconds Complete 2 Sets Perform 2 Times a Day  Cont. Previous ex in addition to today's updated HEP, as tolerated.

## 2025-02-04 ENCOUNTER — TREATMENT (OUTPATIENT)
Dept: PHYSICAL THERAPY | Facility: CLINIC | Age: 55
End: 2025-02-04
Payer: MEDICARE

## 2025-02-04 DIAGNOSIS — S92.352A CLOSED DISPLACED FRACTURE OF FIFTH METATARSAL BONE OF LEFT FOOT, INITIAL ENCOUNTER: ICD-10-CM

## 2025-02-04 PROCEDURE — 97112 NEUROMUSCULAR REEDUCATION: CPT | Mod: GP | Performed by: PHYSICAL THERAPIST

## 2025-02-04 PROCEDURE — 97140 MANUAL THERAPY 1/> REGIONS: CPT | Mod: GP | Performed by: PHYSICAL THERAPIST

## 2025-02-04 PROCEDURE — 97110 THERAPEUTIC EXERCISES: CPT | Mod: GP | Performed by: PHYSICAL THERAPIST

## 2025-02-04 NOTE — PROGRESS NOTES
Physical Therapy Treatment      Patient Name: Samina Celeste  MRN: [id number]  Today's Date: 2025    Time Entry: Start time: 1213            End time: 1256    Reason for Visit  Referred Dx:Closed displaced fracture of fifth metatarsal bone of left foot, initial encounter [J05.496A]  Referred By: Sravani Cabrales MD   Insurance Information  Visit #: 7  Approved Visits: MED NEC VISITS   Auth required:  AUTH REQ   Insurance:BRIANNA   : verified with patient  DOS:2024     Current problem: S/p Closed displaced fracture of fifth metatarsal bone of left foot, initial encounter [P04.594O]    Subjective   The patient reports the compliance with HEP. Patient mentioned that she still feels discomfort while walking in the side and the top of the L foot. Patient states that she has difficulty doing steps and wants to work on that.   Precautions  STEADI Fall Risk Score (The score of 4 or more indicates an increased risk of falling): 0  Precautions Comment: Mod - high fall risk  Pain  At present: 3/10  At Best: 2/10  At Worst: 5/10  Aggravating Factors: moving the foot in the boot , touch the foot or try to put the weight on L foot  Relieving factors: elevating the foot, pain meds    Objective   Today's finding:   TTP: around the scar, dorsum of foot (3- 5 metatarsals) L side  Spasm/Tightness: Achillis, toe flexors L side  Scar: healing mobile sx scar parallel to the 5 the metatarsal with dry scalyskin  Swelling: Mild swelling and raised temp around the scar  ROM:  Knee ROM: wfl              Flexion:R: wfl L:wfl               Extension: R:-5* L: -5*   Ankle ROM:              DF: R:10* L: 5*              PF: R: 50  L: 50*              Inv: R:40 L: 26              EV: R: 15 L: 7  MMT:  Ankle MMT:  DF: R:4+/5 L:4-/5 (painful)              PF: R:4+/5 L:3/5 (painful)              Inv: R:4+/5 L: 3+/5 (painful)              Ev: R:4+/5 L:3/5  (painful)  Knee MMT:              Flex: R:4+/5 L:4/5              Ext: R:/5  "L:4/5  Gait Analysis: Patient walked to the clinic non weight bearing using a scooter with the cam boot on L leg.   Treatment Performed:   Therapeutic Exercise:    Recumbent bike - level 2.5 - 5 m  Step up - forward - wt shift transfer- 2\" step  Step up - lateral- wt shift transfer- 2\" step  Step down - forward - wt shift transfer- 2\" step  Step down - lateral- wt shift transfer- 2\" step  SL glut taps- 1 x 10 reps  Ascending stairs- wt shift transfer- 2 flights of stairs  Descending stairs- wt shift transfer- 2 flights of stairs   Manual Therapy:   Desensitize scar  Subtalar joint distraction  Gr I, II, III mob mid tarsals and sub talar talotibial jt  Dynamic achillis stretch on a step- 1 x 3 reps, hold 20s  Soleus stretch - 1 x 3 reps, hold 20s  Neuromuscular Re-education:   SLS - BLE - 1 x 3 reps, hold 15s  Lateral weight shifts - tilt board  Forward/ backward weight shifts -tilt board  Charges today:  Ther Ex: 20 m  Manual therapy: 12 m  Neuromuscular elisabeth: 10 m  Assessment:  The patient walked today without the use of an assistive device, wearing tennis shoes, but exhibited limping on the left lower extremity (LLE) and an antalgic gait. She reported experiencing discomfort and pain when standing on left leg during walking and climbing, which increased the difficulty of performing these activities in her daily life. 5* improvement was measured in the ankle dorsiflexion joint mobility; however, hypomobility was noted during the joint play assessment at the subtalar, midtarsal, and the fourth and fifth intermetatarsal joints. Gentle mobilizations were performed to improve accessory motion and joint mobility through distraction techniques. Increased movement was noted, and the patient reported feeling looser around the joint post-mobilization. Dynamic stretching of the Achilles tendon was performed in standing to improve ankle plantar flexor flexibility and joint proprioception. Isolated soleus stretching was " "introduced today to continue improving ankle mobility. Static and dynamic balance exercises were implemented, along with glute strengthening, though increased fatigue was noted after single-leg balancing. The patient was able to perform single-leg stands: 9 seconds on the right and 3 seconds on the left, showing improvement compared to the previous session. Staircase training focusing on eccentric quadriceps and hip strength through step-downs was performed. The patient required minimal direction and continued verbal cues while executing these movements for biomechanical corrections and safety. Knee and ankle control exercises were introduced during step-ups, incorporating weight shifts on the LLE. The patient reported soreness but minimal discomfort after completing 8-9 repetitions. At the end of the session, she was able to perform reciprocal stairs using one hand for support while going upstairs and required assistance for one step at a time when descending. The patient will continue working on developing ankle and knee control, as well as neuromuscular re-education, as tolerated during follow-up sessions, in order to achieve reciprocal, independent, and safe functional mobility. HEP updated and reviewed with the patient.  Plan:  The patient will benefit from a skilled PT to improve foot -ankle joint mobility, strength and flexibility and balance facilitate independent transition, and transfers confidently at home and in the community.     HEP:   Forward Step Down  - Step down with your unaffected leg and tap your heel on the floor before returning. You can use a hand hold to help if needed.   - Repeat 10 Times Complete 2 Sets Hold 3 Seconds Perform 2 Times a Day   Forward Step Ups   - Forward step up with Leg Drivers Step up onto a 2 - 4\"\" step with one foot. With the opposite leg drive the knee up towards your chest. Return both feet to the floor and repeat.   - Repeat 10 Times Complete 2 Sets Hold 3 Seconds " Perform 2 Times a Day

## 2025-02-07 ENCOUNTER — TREATMENT (OUTPATIENT)
Dept: PHYSICAL THERAPY | Facility: CLINIC | Age: 55
End: 2025-02-07
Payer: MEDICARE

## 2025-02-07 DIAGNOSIS — S92.352A CLOSED DISPLACED FRACTURE OF FIFTH METATARSAL BONE OF LEFT FOOT, INITIAL ENCOUNTER: ICD-10-CM

## 2025-02-07 PROCEDURE — 97112 NEUROMUSCULAR REEDUCATION: CPT | Mod: GP | Performed by: PHYSICAL THERAPIST

## 2025-02-07 PROCEDURE — 97110 THERAPEUTIC EXERCISES: CPT | Mod: GP | Performed by: PHYSICAL THERAPIST

## 2025-02-07 PROCEDURE — 97140 MANUAL THERAPY 1/> REGIONS: CPT | Mod: GP | Performed by: PHYSICAL THERAPIST

## 2025-02-07 NOTE — PROGRESS NOTES
Physical Therapy Treatment      Patient Name: Samina Celeste  MRN: [id number]  Today's Date: 2025    Time Entry: Start time: 1207            End time: 1251    Reason for Visit  Referred Dx:Closed displaced fracture of fifth metatarsal bone of left foot, initial encounter [P15.876T]  Referred By: Sravani Cabrales MD   Insurance Information  Visit #: 8  Approved Visits: MED NEC VISITS   Auth required:  AUTH REQ   Insurance:BRIANNA DUONGB: verified with patient  DOS:2024     Current problem: S/p Closed displaced fracture of fifth metatarsal bone of left foot, initial encounter [Y38.894L]    Subjective   The patient states that she wasn't feeling good for last couple of the days so didn't do exs and took some rest.   Precautions  STEADI Fall Risk Score (The score of 4 or more indicates an increased risk of falling): 0  Precautions Comment: Mod - high fall risk  Pain  At present: 3/10  At Best: 2/10  At Worst: 5/10  Aggravating Factors: moving the foot in the boot , touch the foot or try to put the weight on L foot  Relieving factors: elevating the foot, pain meds    Objective   Today's finding:   TTP: around the scar, dorsum of foot (3- 5 metatarsals) L side  Spasm/Tightness: Achillis, toe flexors L side  Scar: healing mobile sx scar parallel to the 5 the metatarsal with dry scalyskin  Swelling: Mild swelling and raised temp around the scar  ROM:  Knee ROM: wfl              Flexion:R: wfl L:wfl               Extension: R:-5* L: -5*   Ankle ROM:              DF: R:10* L: 5*              PF: R: 50  L: 50*              Inv: R:40 L: 26              EV: R: 15 L: 7  MMT:  Ankle MMT:  DF: R:4+/5 L:4-/5 (painful)              PF: R:4+/5 L:3/5 (painful)              Inv: R:4+/5 L: 3+/5 (painful)              Ev: R:4+/5 L:3/5  (painful)  Knee MMT:              Flex: R:4+/5 L:4/5              Ext: R:/5 L:4/5  Balance : R: 16s  , L: 3s (unsteady, weak and painful- require one hand support)  Gait Analysis: Patient walked  "to the clinic non weight bearing using a scooter with the cam boot on L leg.   Treatment Performed:   Therapeutic Exercise:    Recumbent bike - level 2.5 - 5 m  TA strength - heel raise - BLE - 2 x 10 reps, hold 3s  TA stregth - heel raise - floor - LLE - 2 x 10 reps, hold 3s  TG claf stretch - BLE - 1 x 10 reps , hold 5s  TG BLE leg press- 1 x 10 reps , hold 5s  TG - LLE leg press- 1 x 10 reps , hold 5s  TG leg press with airex- 1 x 10 reps , hold 5s  Step up/ down - forward - wt shift transfer- 2\" step - 2 x 10 reps   Step up/ down - lateral- wt shift transfer- 2\" step- 2 x 10 reps    Ascending stairs- wt shift transfer- 2 flights of stairs  Descending stairs- wt shift transfer- 2 flights of stairs   Manual Therapy:   Desensitize scar  Subtalar joint distraction  Gr I, II, III mob mid tarsals and sub talar talotibial jt  Dynamic achillis stretch on a step- 1 x 3 reps, hold 20s  Soleus stretch - 1 x 3 reps, hold 20s  Neuromuscular Re-education:   SLS - BLE - 1 x 3 reps, hold 15s  Lateral weight shifts - tilt board  Ankle stability and balance - Bosu - 1 x 3 reps, hold 15s  Rocker board ankle mob - standing - LLE - fwd/ bwd/ lateral and circles - 1 x 10 reps  Charges today:  Ther Ex: 20 m  Manual therapy: 12 m  Neuromuscular elisabeth: 10 m  Assessment:  The patient walked in with an antalgic gait, exhibiting pain and limping in the left lower extremity (LLE). Today's treatment focused on improving ankle stability, tibialis anterior (TA) flexibility, and activating and strengthening the plantar flexors (PF) in both lower extremities (BLE) and specifically the LLE. At the beginning of the session, ankle dorsiflexion (DF) was measured at 5 degrees. The patient demonstrated improvement in PF strength and control with heel raise on BLE; however, there were difficulties with activating PF and performing heel raises on SL in LLE while in a standing position. Decreased control and weakness were noted in the left ankle and foot " "while descending stairs, requiring the use of one hand for support. The patient was able to perform single-leg stands for 9 seconds on the right and 3 seconds on the left, showing improvement compared to the previous session. Staircase training targeted eccentric quadriceps and hip strength through step-down exercises. The patient needed minimal direction and continued verbal cues for biomechanical corrections and safety while executing these movements. Knee and ankle control exercises were introduced during step-ups, incorporating weight shifts on the LLE.  Moving forward, the patient will continue to work on developing ankle and knee control, as well as neuromuscular re-education, as tolerated in follow-up sessions to achieve reciprocal, independent, and safe functional mobility. The patient was informed about the importance of ongoing therapy to maintain improvements and progress toward established goals.   Plan:  The patient will benefit from a skilled PT to improve foot -ankle joint mobility, strength and flexibility and balance facilitate independent transition, and transfers confidently at home and in the community.     HEP:   Forward Step Down  - Step down with your unaffected leg and tap your heel on the floor before returning. You can use a hand hold to help if needed.   - Repeat 10 Times Complete 2 Sets Hold 3 Seconds Perform 2 Times a Day   Forward Step Ups   - Forward step up with Leg Drivers Step up onto a 2 - 4\"\" step with one foot. With the opposite leg drive the knee up towards your chest. Return both feet to the floor and repeat.   - Repeat 10 Times Complete 2 Sets Hold 3 Seconds Perform 2 Times a Day  "

## 2025-02-12 ENCOUNTER — APPOINTMENT (OUTPATIENT)
Dept: PHYSICAL THERAPY | Facility: CLINIC | Age: 55
End: 2025-02-12
Payer: MEDICARE

## 2025-02-14 ENCOUNTER — TREATMENT (OUTPATIENT)
Dept: PHYSICAL THERAPY | Facility: CLINIC | Age: 55
End: 2025-02-14
Payer: MEDICARE

## 2025-02-14 ENCOUNTER — TELEPHONE (OUTPATIENT)
Dept: PRIMARY CARE | Facility: CLINIC | Age: 55
End: 2025-02-14
Payer: MEDICARE

## 2025-02-14 DIAGNOSIS — F41.9 ANXIETY: ICD-10-CM

## 2025-02-14 DIAGNOSIS — S92.352A CLOSED DISPLACED FRACTURE OF FIFTH METATARSAL BONE OF LEFT FOOT, INITIAL ENCOUNTER: ICD-10-CM

## 2025-02-14 PROCEDURE — 97112 NEUROMUSCULAR REEDUCATION: CPT | Mod: GP | Performed by: PHYSICAL THERAPIST

## 2025-02-14 PROCEDURE — 97140 MANUAL THERAPY 1/> REGIONS: CPT | Mod: GP | Performed by: PHYSICAL THERAPIST

## 2025-02-14 PROCEDURE — 97113 AQUATIC THERAPY/EXERCISES: CPT | Mod: GP | Performed by: PHYSICAL THERAPIST

## 2025-02-14 RX ORDER — BUPROPION HYDROCHLORIDE 300 MG/1
300 TABLET ORAL EVERY MORNING
Qty: 90 TABLET | Refills: 0 | Status: SHIPPED | OUTPATIENT
Start: 2025-02-14

## 2025-02-14 NOTE — PROGRESS NOTES
Physical Therapy Treatment      Patient Name: Samina Celeste  MRN: [id number]  Today's Date: 2025    Time Entry: Start time: 233            End time: 319    Reason for Visit  Referred Dx:Closed displaced fracture of fifth metatarsal bone of left foot, initial encounter [Z36.871A]  Referred By: Sravani Cabrales MD   Insurance Information  Visit #: 9  Approved Visits: MED NEC VISITS   Auth required:  AUTH REQ   Insurance:BRIANNA   : verified with patient  DOS:2024     Current problem: S/p Closed displaced fracture of fifth metatarsal bone of left foot, initial encounter [Y63.889Z]    Subjective   The patient states that she has felt very sore for the last few days since she did a lot of work helping her mother to and from the hospital and doing other housework. She states that her foot was swollen at the end of the day at that time. The patient states that she elevated her leg for some time and felt fine. The patient states that her steps are getting better; however, she still can go one step at a time going up stairs.   Precautions  STEADI Fall Risk Score (The score of 4 or more indicates an increased risk of falling): 0  Precautions Comment: Mod - high fall risk  Pain  At present: 2/10  At Best: 2/10  At Worst: 5/10  Aggravating Factors: moving the foot in the boot , touch the foot or try to put the weight on L foot  Relieving factors: elevating the foot, pain meds    Objective   Today's finding:   TTP: around the scar, dorsum of foot (3- 5 metatarsals) L side  Spasm/Tightness: Achillis, toe flexors L side  Scar: healing mobile sx scar parallel to the 5 the metatarsal with dry scalyskin  Swelling: Mild swelling and raised temp around the scar  ROM:  Knee ROM: wfl              Flexion:R: wfl L:wfl               Extension: R:-5* L: -5*   Ankle ROM:              DF: R:10* L: 5*              PF: R: 50  L: 50*              Inv: R:40 L: 26              EV: R: 15 L: 7  MMT:  Ankle MMT:  DF: R:4+/5 L:4-/5  "(painful)              PF: R:4+/5 L:3/5 (painful)              Inv: R:4+/5 L: 3+/5 (painful)              Ev: R:4+/5 L:3/5  (painful)  Knee MMT:              Flex: R:4+/5 L:4/5              Ext: R:/5 L:4/5  Balance : R: 16s  , L: 3s (unsteady, weak and painful- require one hand support)  Gait Analysis: Patient walked to the clinic non weight bearing using a scooter with the cam boot on L leg.   Treatment Performed:   Therapeutic Exercise:    Recumbent bike - level 2.5 - 5 m  TG BLE leg press- 1 x 10 reps , hold 5s  TG - LLE leg press- 1 x 10 reps , hold 5s  TG leg press with airex- 1 x 10 reps , hold 5s  Heel raises - BLE - 1 x 20 reps    Heel raises - LLE - 1 x 20 reps, hold 5s  Supported squats on bosu  Step up/ down - forward - wt shift transfer- 2\" step - 2 x 10 reps   Step up/ down - lateral- wt shift transfer- 2\" step- 2 x 10 reps    Ascending stairs- wt shift transfer- 2 flights of stairs  Descending stairs- wt shift transfer- 2 flights of stairs   Manual Therapy:   Desensitize scar  Subtalar joint distraction  Gr I, II, III mob mid tarsals and sub talar talotibial jt  Dynamic achillis stretch on a step- 1 x 3 reps, hold 20s  Neuromuscular Re-education:   SLS - BLE - 1 x 3 reps, hold 15s  Tandem stance - BLE - 1 x 3 reps, hold 30  Lateral weight shifts - tilt board  Fwd/ bwd tilt and wt shift  SL standing on Bosu - 1 x 3 reps, hold 15s  Rocker board ankle mob - standing - LLE - fwd/ bwd/ lateral and circles - 1 x 10 reps  Charges today:  Ther Ex: 20 m  Manual therapy: 12 m  Neuromuscular elisabeth: 12 m  Assessment:  The patient presents with an antalgic gait and limps to LLE with no AD. The patient presents with improvement and can perform stairclimbing reciprocally while ascending stairs; however, cont to have difficulty descending and performing it one step at a time. Increased tightness and weakness in the L calf were noted compared to the R side and 3+ MMT of Aachillis, with difficulty performing heel raises " "in standing. The treatment began with a gentle mob followed by TA stretching and strengthening. The patient required minimum direction for guided stretching to prevent overstretching due to genu recurvatum. Eccentric quads strengthening was performed, and the patient showed improvement in being able to perform with a brief hold, which is required for descending stairs. Sl balancing was performed, and two-finger support was needed on LLE after 8-9 s. Stairclimbing with 2 flights of stairs was performed at the end of the session, the patient achieved the function and was able to do it reciprocally. The patient will be assessed for progress on the follow-up visit, and PT will be focused on the eccentric strength of the BLE hip, knee, and ankle and progressing to static and dynamic balance; patient discharge will be discussed upon HEP with the patient.    Plan:  The patient will benefit from a skilled PT to improve foot -ankle joint mobility, strength and flexibility and balance facilitate independent transition, and transfers confidently at home and in the community.     HEP:   Forward Step Down  - Step down with your unaffected leg and tap your heel on the floor before returning. You can use a hand hold to help if needed.   - Repeat 10 Times Complete 2 Sets Hold 3 Seconds Perform 2 Times a Day   Forward Step Ups   - Forward step up with Leg Drivers Step up onto a 2 - 4\"\" step with one foot. With the opposite leg drive the knee up towards your chest. Return both feet to the floor and repeat.   - Repeat 10 Times Complete 2 Sets Hold 3 Seconds Perform 2 Times a Day  "

## 2025-02-14 NOTE — TELEPHONE ENCOUNTER
REFILL  MEDICATION:     Bupropion  MG; Take 1 tablet daily in the morning.     PHARM: [facility]   PHARM NUMBER: [phone]    LR: 10/8/24       90 tablets with 0 refills  LV: 1/3/25  NV: 6/3/25

## 2025-02-18 ENCOUNTER — APPOINTMENT (OUTPATIENT)
Dept: PHYSICAL THERAPY | Facility: CLINIC | Age: 55
End: 2025-02-18
Payer: MEDICARE

## 2025-02-21 ENCOUNTER — TREATMENT (OUTPATIENT)
Dept: PHYSICAL THERAPY | Facility: CLINIC | Age: 55
End: 2025-02-21
Payer: MEDICARE

## 2025-02-21 DIAGNOSIS — S92.352A CLOSED DISPLACED FRACTURE OF FIFTH METATARSAL BONE OF LEFT FOOT, INITIAL ENCOUNTER: ICD-10-CM

## 2025-02-21 PROCEDURE — 97112 NEUROMUSCULAR REEDUCATION: CPT | Mod: GP | Performed by: PHYSICAL THERAPIST

## 2025-02-21 PROCEDURE — 97110 THERAPEUTIC EXERCISES: CPT | Mod: GP | Performed by: PHYSICAL THERAPIST

## 2025-02-21 PROCEDURE — 97140 MANUAL THERAPY 1/> REGIONS: CPT | Mod: GP | Performed by: PHYSICAL THERAPIST

## 2025-02-21 NOTE — PROGRESS NOTES
Physical Therapy Treatment      Patient Name: Samina Celeste  MRN: [id number]  Today's Date: 2025    Time Entry: Start time: 904            End time: 954    Reason for Visit  Referred Dx:Closed displaced fracture of fifth metatarsal bone of left foot, initial encounter [B80.361X]  Referred By: Sravani Cabrales MD   Insurance Information  Visit #: 10  Approved Visits: MED NEC VISITS   Auth required:  AUTH REQ   Insurance:BRIANNA   : verified with patient  DOS:2024     Current problem: S/p Closed displaced fracture of fifth metatarsal bone of left foot, initial encounter [P91.980X]    Subjective   The patient states the compliance with the HEP with no increase in pain and are feeling enough challenging while doing them.   Precautions  STEADI Fall Risk Score (The score of 4 or more indicates an increased risk of falling): 0  Precautions Comment: Mod - High fall risk  Pain  At present: 2/10  At Best: 2/10  At Worst: 5/10  Aggravating Factors: moving the foot in the boot , touch the foot or try to put the weight on L foot  Relieving factors: elevating the foot, pain meds    Objective   Today's finding:   TTP: around the scar, dorsum of foot (3- 5 metatarsals) L side  Spasm/Tightness: Achillis, toe flexors L side  Scar: healing mobile sx scar parallel to the 5 the metatarsal with dry scalyskin  Swelling: Mild swelling and raised temp around the scar  ROM:  Knee ROM: wfl              Flexion:R: wfl L:wfl               Extension: R:-5* L: -5*   Ankle ROM:              DF: R:15* L: 9*              PF: R: 50  L: 50*              Inv: R:40 L: 32              EV: R: 15 L: 10  MMT:  Ankle MMT:  DF: R:4+/5 L:4-/5 (painful)              PF: R:4+/5 L:3/5 (painful)              Inv: R:4+/5 L: 4-/5 (painful)              Ev: R:4+/5 L:3/5  (painful)  Knee MMT:              Flex: R:4+/5 L:4/5              Ext: R:4/5 L:4/5  Balance : R: 16s  , L: 6s (unsteady, weak and painful- require occasional use of one hand  "support)  Gait Analysis: Patient walked to the clinic non weight bearing using a scooter with the cam boot on L leg.   LEFS: 31/80 = 38.75% = min - mod functional limitations  Treatment Performed:   Therapeutic Exercise:    Recumbent bike - level 2.5 - 5 m  DL squat on Bosu - 1 x 10 reps, hold 3s  SL squat on Bosu - 1 x 10 reps, hold 3s  TG BLE leg press- level 5 - SDR 3- 1 x 10 reps , hold 5s  TG leg press with airex- level 5 - SDR 3 - 1 x 10 reps , hold 5s  TG - LLE leg press- level 5 - SDR 5- 4 x 5 reps , hold 5s  Heel raises - BLE - 1 x 20 reps    Heel raises - LLE - 1 x 20 reps, hold 5s  Supported squats on bosu  Step up/ down - forward - wt shift transfer- 2\" step - 2 x 10 reps   Step up/ down - lateral- wt shift transfer- 2\" step- 2 x 10 reps    Manual Therapy:   Desensitize scar  Subtalar joint distraction  Gr I, II, III mob mid tarsals and sub talar talotibial jt  Dynamic achillis stretch on a step- 1 x 3 reps, hold 20s  Neuromuscular Re-education:   SLS - BLE - 1 x 3 reps, hold 15s  Tandem stance - BLE - 1 x 3 reps, hold 30  Lateral weight shifts - tilt board  Fwd/ bwd tilt and wt shift  SL standing on Bosu - 1 x 3 reps, hold 15s  Charges today:  Ther Ex: 20 m  Manual therapy: 15 m  Neuromuscular elisabeth: 15 m  Assessment:  The patient presents with low-intensity dull/ achy constant pain in her L foot at rest and increases with standing and walking activity with feelings of fatigue and tightness. The patient walked in with an antalgic gait and liming on LLE, with no AD. The patient was assessed for progress in functional MS strength and ranges and improvement in functional outcome measures. Increased PF strength 4/5 noted with improved ability to perform SLS: on LE with decreased high guard and increased duration to 6s with decreased hand support, clinically significant improvement in LEFS noted, indicating improvement in functional activities. The patient continues to show hypo mob accessory motion and " terminal limitation ankle DF, challenged balance, and unsteadiness while performing functional transitions and transfers. Today's treatment began with a gentle mob of the talocrural joint with AP  glide and mid-tarsal mob performed in long sitting to improve DF, 3*of improvement with DF to 12* in LLE. Calf stretch on the step was performed, and BLE and LLE were performed using dynamic strengthening. The patient performed with minimum directions and knee precautions for GR. The patient continues to show difficulty performing heel raises with SL on LLE with weakness, resulting in quick fatigue after 2-3 reps. Functional strengthening was challenged with balance when performing SL squats on Bosu. Static and dynamic balance was challenged on bosu, and decreased stability and increased unsteadiness were noted in LLE. Eccentric knee and ankle PF strengthening progressed and challenged with increased reps. The patient reports increased fatigue with no change in pain. The follow-up PT continues to improve knee and ankle eccentric strength and control, challenge the balance to improve BLE static and dynamic balance and achieve established goals.d    Plan:  The patient will benefit from a skilled PT to improve foot -ankle joint mobility, strength and flexibility and balance facilitate independent transition, and transfers confidently at home and in the community.     HEP:   STANDING HEEL RAISES WITH FREE WEIGHTS - CALF RAISES - SINGLE LEG   - While standing and holding free weights balance on one leg. Next, raise up on your toes as you lift your heel off the ground. Lower back down and repeat.   - Repeat 10 Times Complete 2 Sets  Hold 3 Seconds Perform 2 Times a Day   STEP DOWN - LATERAL  - Start with both feet on top of a step/box. Next, slowly lower the unaffected leg down off the side of the step/box to lightly touch the heel to the floor. Then return to the original position with both feet on the step/box. Maintain proper  knee alignment: Knee in line with the 2nd toe and not passing in front of the toes.   - Repeat 10 Times Complete 2 Sets Hold 3 Seconds Perform 2 Times a Day  Calf stretch  - Single Leg Stand Keeping your hands on your hips (as shown), bend one knee to raise that foot off the floor.  Keep the trunk upright and still for the duration of the hold.   - Repeat 5 Times Complete 2 Sets Hold 20 Seconds Perform 2 Times a Day   STEP DOWN - FORWARD - UNSTABLE   - Start with both feet on top of a step/box and on top of an unstable surface such as a foam pad. Next, slowly lower the unaffected leg down forward off the step/box to lightly touch the heel to the floor. Then return to the original position with both feet on the step/box. Maintain proper knee alignment: Knee in line with the 2nd toe and not passing in front of the toes.   - Repeat 10 Times Complete 2 Sets Created by Chacha Smiley Hold 3 Seconds Perform 2 Times a Day

## 2025-02-24 ENCOUNTER — TELEPHONE (OUTPATIENT)
Dept: PRIMARY CARE | Facility: CLINIC | Age: 55
End: 2025-02-24
Payer: MEDICARE

## 2025-02-24 DIAGNOSIS — F41.9 ANXIETY: ICD-10-CM

## 2025-02-24 RX ORDER — DULOXETIN HYDROCHLORIDE 60 MG/1
60 CAPSULE, DELAYED RELEASE ORAL 2 TIMES DAILY
Qty: 180 CAPSULE | Refills: 0 | Status: SHIPPED | OUTPATIENT
Start: 2025-02-24

## 2025-02-25 ENCOUNTER — TREATMENT (OUTPATIENT)
Dept: PHYSICAL THERAPY | Facility: CLINIC | Age: 55
End: 2025-02-25
Payer: MEDICARE

## 2025-02-25 DIAGNOSIS — S92.352A CLOSED DISPLACED FRACTURE OF FIFTH METATARSAL BONE OF LEFT FOOT, INITIAL ENCOUNTER: ICD-10-CM

## 2025-02-25 PROCEDURE — 97110 THERAPEUTIC EXERCISES: CPT | Mod: GP | Performed by: PHYSICAL THERAPIST

## 2025-02-25 PROCEDURE — 97112 NEUROMUSCULAR REEDUCATION: CPT | Mod: GP | Performed by: PHYSICAL THERAPIST

## 2025-02-25 PROCEDURE — 97140 MANUAL THERAPY 1/> REGIONS: CPT | Mod: GP | Performed by: PHYSICAL THERAPIST

## 2025-02-25 NOTE — PROGRESS NOTES
Physical Therapy Treatment      Patient Name: Samina Celeste  MRN: [id number]  Today's Date: 2025    Time Entry: Start time: 318            End time: 402  Reason for Visit  Referred Dx:Closed displaced fracture of fifth metatarsal bone of left foot, initial encounter [W92.383A]  Referred By: Sravani Cabrales MD   Insurance Information  Visit #: 11  Approved Visits: MED NEC VISITS   Auth required:  AUTH REQ   Insurance:BRIANNA   : verified with patient  DOS:2024     Current problem: S/p Closed displaced fracture of fifth metatarsal bone of left foot, initial encounter [T43.764O]    Subjective   The patient states compliance with the HEP with no increase in pain or discomfort. She continues to focus on balancing one leg and feels very happy that she can do it after a long time on her left foot. The patient reports that her ankle feels tired and swollen on busy days after longer walking and staying on her feet.    Precautions  STEADI Fall Risk Score (The score of 4 or more indicates an increased risk of falling): 0  Precautions Comment: Mod - High fall risk  Pain  At present: 2/10  At Best: 2/10  At Worst: 5/10  Aggravating Factors: moving the foot in the boot , touch the foot or try to put the weight on L foot  Relieving factors: elevating the foot, pain meds    Objective   Today's finding:   TTP: around the scar, dorsum of foot (3- 5 metatarsals) L side  Spasm/Tightness: Achillis, toe flexors L side  Scar: healing mobile sx scar parallel to the 5 the metatarsal with dry scalyskin  Swelling: Mild swelling and raised temp around the scar  ROM:  Knee ROM: wfl              Flexion:R: wfl L:wfl               Extension: R:-5* L: -5*   Ankle ROM:              DF: R:10* L: 5*              PF: R: 50  L: 50*              Inv: R:40 L: 26              EV: R: 15 L: 7  MMT:  Ankle MMT:  DF: R:4+/5 L:4-/5 (painful)              PF: R:4+/5 L:3/5 (painful)              Inv: R:4+/5 L: 3+/5 (painful)              Ev:  "R:4+/5 L:3/5  (painful)  Knee MMT:              Flex: R:4+/5 L:4/5              Ext: R:/5 L:4/5  Balance : R: 16s  , L: 3s (unsteady, weak and painful- require one hand support)  Gait Analysis: Patient walked to the clinic non weight bearing using a scooter with the cam boot on L leg.   Treatment Performed:   Therapeutic Exercise:    Recumbent bike - level 2.5 - 5 m  TG BLE leg press- 2 x 10 reps , hold 5s  TG - LLE leg press- 2 x 10 reps , hold 5s  TG leg press with airex- BLE - 2 x 10 reps , hold 5s  TG leg press with airex- LLE - 2 x 10 reps , hold 5s  Step up/ down - forward - wt shift transfer- 2\" step - 2 x 10 reps   Step up/ down - lateral- wt shift transfer- 2\" step- 2 x 10 reps  DL squats with achillis engaged on bosu -2 x 10 reps   Step up/ down - forward - wt shift transfer- 4\" step - 1 x 10 reps   Step up/ down - lateral- wt shift transfer- 4\" step- 1 x 10 reps     Manual Therapy:   Hamstrings stretch   Dynamic achillis stretch on a step- 1 x 3 reps, hold 20s  Neuromuscular Re-education:   SLS - BLE - 1 x 3 reps, hold 15s  SLS airex - 1 x 3 reps, hold 15s  Bosu squats - 1 x 10 reps , hold 3s  SL standing on Bosu - 1 x 3 reps, hold 15s  Lateral weight shifts - tilt board  Fwd/ bwd tilt and wt shift  Charges today:  Ther Ex: 20 m  Manual therapy: 9 m  Neuromuscular elisabeth: 15 m  Assessment:  The patient walks with an antalgic gait and limps on the L side while walking. The patient presents with tightness and weakness in L calf and ankle, difficulty SLS balance, and difficulty performing stairs (down>up). Treatment began with flexibility exercises for the Achilles and hamstrings, which required minimal direction to prevent knee hyperextension. Achilles dynamic stretching and strengthening were facilitated with leg press with Airex. Increased fatigue was noted, with no change in pain. Knee and ankle eccentric control progressed with 4\" ht step; unsteadiness and frequent fatigue were reported after 5-6 reps. " "Static and dynamic balance progressed on bosu, increased challenge noted while performing fwd/ bwd balancing compared to lateral balancing with feeling of tightness on L side. HEP reviewed with the patient, and the patient verbalized understanding. The patient is advised to cont Hep for 2-3 weeks and follow up after 2 - 3 weeks for progress and improvement. The patient brought the toe brace for Hallux valgus; it was adjusted and applied. The patient walked a few steps with that and felt comfortable with no change in pain or any complaints; the patient is advised to continue wearing it and adjust as tolerated and needed.     Plan:  Cont HEP , follow up after 2 - 3 weeks for progress and improvement.     HEP:   Forward Step Down  - Step down with your unaffected leg and tap your heel on the floor before returning. You can use a hand hold to help if needed.   - Repeat 10 Times Complete 2 Sets Hold 3 Seconds Perform 2 Times a Day   Forward Step Ups   - Forward step up with Leg Drivers Step up onto a 2 - 4\"\" step with one foot. With the opposite leg drive the knee up towards your chest. Return both feet to the floor and repeat.   - Repeat 10 Times Complete 2 Sets Hold 3 Seconds Perform 2 Times a Day  "

## 2025-02-26 ENCOUNTER — APPOINTMENT (OUTPATIENT)
Dept: PAIN MEDICINE | Facility: CLINIC | Age: 55
End: 2025-02-26
Payer: MEDICARE

## 2025-02-26 VITALS
SYSTOLIC BLOOD PRESSURE: 113 MMHG | DIASTOLIC BLOOD PRESSURE: 55 MMHG | OXYGEN SATURATION: 96 % | HEART RATE: 74 BPM | RESPIRATION RATE: 16 BRPM

## 2025-02-26 DIAGNOSIS — M54.42 CHRONIC MIDLINE LOW BACK PAIN WITH LEFT-SIDED SCIATICA: ICD-10-CM

## 2025-02-26 DIAGNOSIS — G89.29 CHRONIC MIDLINE LOW BACK PAIN WITH LEFT-SIDED SCIATICA: ICD-10-CM

## 2025-02-26 PROCEDURE — 3074F SYST BP LT 130 MM HG: CPT

## 2025-02-26 PROCEDURE — 3078F DIAST BP <80 MM HG: CPT

## 2025-02-26 PROCEDURE — 99213 OFFICE O/P EST LOW 20 MIN: CPT

## 2025-02-26 RX ORDER — HYDROMORPHONE HYDROCHLORIDE 4 MG/1
4 TABLET ORAL 2 TIMES DAILY PRN
Qty: 60 TABLET | Refills: 0 | Status: SHIPPED | OUTPATIENT
Start: 2025-04-06 | End: 2025-05-06

## 2025-02-26 RX ORDER — HYDROMORPHONE HYDROCHLORIDE 4 MG/1
4 TABLET ORAL 2 TIMES DAILY PRN
Qty: 60 TABLET | Refills: 0 | Status: SHIPPED | OUTPATIENT
Start: 2025-05-06 | End: 2025-06-05

## 2025-02-26 RX ORDER — HYDROMORPHONE HYDROCHLORIDE 4 MG/1
4 TABLET ORAL 2 TIMES DAILY PRN
Qty: 60 TABLET | Refills: 0 | Status: SHIPPED | OUTPATIENT
Start: 2025-03-07 | End: 2025-04-06

## 2025-02-26 ASSESSMENT — PAIN - FUNCTIONAL ASSESSMENT: PAIN_FUNCTIONAL_ASSESSMENT: 0-10

## 2025-02-26 ASSESSMENT — PAIN SCALES - GENERAL
PAINLEVEL_OUTOF10: 7
PAINLEVEL_OUTOF10: 7

## 2025-02-26 ASSESSMENT — PAIN DESCRIPTION - DESCRIPTORS: DESCRIPTORS: DULL;ACHING;THROBBING

## 2025-02-26 NOTE — PROGRESS NOTES
Subjective   Patient ID: Samina Celeste is a 54 y.o. female who presents for Med Refill.  HPI    55 yo female presents today for medication refills. She is known to this clinic for chronic low back pain. She is maintained on intrathecal pump and hydromorphone 4mg twice daily for breakthrough pain. She reports 75% pain reduction from the medications, Pain today is rated 7/10 in the low back, states that the pain is bad today. The patient denies any side effects associated with the usage of the medication patient described the medication improving the quality of life and allowing participation in day-to-day activity patient denies otherwise any new complaint patient is requesting a refill. Patient denies usage of any other opiate. Patient denies usage of any recreational drugs. Patient confirms that the opiate prescription is being used as prescribed.       Review of Systems  All 13 systems were reviewed and are within normal levels except as noted below or per HPI. Positive and pertinent negative responses are noted below or in the HPI   Denied any fever or chills. No weight loss and no night sweats. No cough or sputum production. No diarrhea   Denies constipation.   No bladder and bowel incontinence and no other changes in bladder and bowel. No skin changes. Reports tiredness and fatigability only if the pain is not controlled.   Denied opioids diversion and abuse and denies alcoholism. Denies overuse of the pain medications.      Objective   Physical Exam  General   Alert and oriented x4, pleasant and cooperative.      HEENT  Pupils are equal and normal in size. Ears, nose, mouth, and throat appear to be WNL.  Head atraumatic, symmetric.      No signs of sedation or signs of withdrawal apparent.     Psychiatric   No signs of depression apparent. Appropriate mood and affect.     Neuro   No focal neurological deficit apparent. Ambulation at baseline.      Respiratory  No respiratory distress, respirations equal  and unlabored.     Abdomen  Soft and nontender, no distention noted.     Skin  Warm, dry and intact. No skin markings supportive of recent IV drug usage .            Assessment/Plan     55 yo female with chronic low back pain associated with lumbar postlaminectomy syndrome.     I have personally reviewed the [facility] report for this patient. I have considered the risks of abuse, dependence, addiction and diversion. I believe that it is clinically appropriate for this patient to be prescribed this medication based on documented diagnosis.  I believe the benefits of the continuation of the opiate outweighs the risk. Patient has described positive response to the present medication therapy. Patient denies any side effects associated with the usage of the medication. Patient did not elicit any signs supportive of misuse or abuse. The review of the [place] [employer] Reporting prescription is not suggestive of any worrisome pattern. Patient believes the usage of this medication has improved the quality of life and allow him to participate in day-to-day activity. Therefore  I recommend the continuation of this medication and I will be refilling the medication prescription.    The patient was counseled regarding diagnostic results, instructions for management, risk factor reductions, prognosis, patient and family education, impressions, risks and benefits of treatment options and importance of compliance with treatment.     Continue to take hydromorphone as prescribed.     Pump refill scheduled 03/28/2024 with Dr. Xiao.     CSA and Tox screen today, narcan kit provided in office.     Follow up in 3 months for refills.     Explained plan to this patient, and patient verbalized understanding and agreement with the plan.     Please do not hesitate to contact the pain clinic after your visit with any questions or concerns at  M-F 8-4 pm     Patient was reminded not to share medications, not to take prescription  medications that were not prescribed to the patient, and not to increase or change dose without consulting the pain clinic. I advised the patient to always take the least amount of medication needed to keep symptoms under control.          Nikole Grimes, DENNISE-CNP 02/26/25 2:44 PM

## 2025-02-28 ENCOUNTER — APPOINTMENT (OUTPATIENT)
Dept: PHYSICAL THERAPY | Facility: CLINIC | Age: 55
End: 2025-02-28
Payer: MEDICARE

## 2025-03-13 ENCOUNTER — TREATMENT (OUTPATIENT)
Dept: PHYSICAL THERAPY | Facility: CLINIC | Age: 55
End: 2025-03-13
Payer: MEDICARE

## 2025-03-13 DIAGNOSIS — S92.352A CLOSED DISPLACED FRACTURE OF FIFTH METATARSAL BONE OF LEFT FOOT, INITIAL ENCOUNTER: ICD-10-CM

## 2025-03-13 PROCEDURE — 97112 NEUROMUSCULAR REEDUCATION: CPT | Mod: GP | Performed by: PHYSICAL THERAPIST

## 2025-03-13 PROCEDURE — 97110 THERAPEUTIC EXERCISES: CPT | Mod: GP | Performed by: PHYSICAL THERAPIST

## 2025-03-13 NOTE — PROGRESS NOTES
Physical Therapy Treatment      Patient Name: Samina Celeste  MRN: [id number]  Today's Date: 3/13/2025    Time Entry: Start time: 336            End time: 418  Reason for Visit  Referred Dx:Closed displaced fracture of fifth metatarsal bone of left foot, initial encounter [R67.097A]  Referred By: Sravani Cabrales MD   Insurance Information  Visit #: 11  Approved Visits: MED NEC VISITS   Auth required:  AUTH REQ   Insurance:BRIANNA   : verified with patient  DOS:2024     Current problem: S/p Closed displaced fracture of fifth metatarsal bone of left foot, initial encounter [B11.638N]    Subjective   The patient states compliance with the HEP with no increase in pain or discomfort. She continues to focus on balancing one leg and feels very happy that she can do it after a long time on her left foot. The patient reports that her ankle feels tired and swollen on busy days after longer walking and staying on her feet.       Pain  At present: 2/10  At Best: 2/10  At Worst: 5/10  Aggravating Factors: moving the foot in the boot , touch the foot or try to put the weight on L foot  Relieving factors: elevating the foot, pain meds    Objective   Today's finding:   TTP: around the scar, dorsum of foot (3- 5 metatarsals) L side  Spasm/Tightness: Achillis, toe flexors L side  Scar: healing mobile sx scar parallel to the 5 the metatarsal with dry scalyskin  Swelling: Mild swelling and raised temp around the scar  ROM:  Knee ROM: wfl              Flexion:R: wfl L:wfl               Extension: R:-5* L: -5*   Ankle ROM:              DF: R:10* L: 5*              PF: R: 50  L: 50*              Inv: R:40 L: 26              EV: R: 15 L: 7  MMT:  Ankle MMT:  DF: R:4+/5 L:4-/5 (painful)              PF: R:4+/5 L:3/5 (painful)              Inv: R:4+/5 L: 3+/5 (painful)              Ev: R:4+/5 L:3/5  (painful)  Knee MMT:              Flex: R:4+/5 L:4/5              Ext: R:/5 L:4/5  Balance : R: 16s  , L: 3s (unsteady, weak and  "painful- require one hand support)  Gait Analysis: Patient walked to the clinic non weight bearing using a scooter with the cam boot on L leg.   Treatment Performed:   Therapeutic Exercise:    Recumbent bike - level 2.5 - 5 m  TG BLE leg press- 2 x 10 reps , hold 5s  TG - LLE leg press- 2 x 10 reps , hold 5s  TG leg press with airex- BLE - 2 x 10 reps , hold 5s  TG leg press with airex- LLE - 2 x 10 reps , hold 5s  Step up/ down - forward - wt shift transfer- 2\" step - 2 x 10 reps   Step up/ down - lateral- wt shift transfer- 2\" step- 2 x 10 reps  DL squats with achillis engaged on bosu -2 x 10 reps   Step up/ down - forward - wt shift transfer- 4\" step - 1 x 10 reps   Step up/ down - lateral- wt shift transfer- 4\" step- 1 x 10 reps     Manual Therapy:   Hamstrings stretch   Dynamic achillis stretch on a step- 1 x 3 reps, hold 20s  Neuromuscular Re-education:   SLS - BLE - 1 x 3 reps, hold 15s  SLS airex - 1 x 3 reps, hold 15s  Bosu squats - 1 x 10 reps , hold 3s  SL standing on Bosu - 1 x 3 reps, hold 15s  Lateral weight shifts - tilt board  Fwd/ bwd tilt and wt shift  Charges today:  Ther Ex: 17 m  Neuromuscular elisabeth: 25 m  Assessment:  The patient walks with an antalgic gait and limps on the L side while walking. The patient presents with tightness and weakness in L calf and ankle, difficulty SLS balance, and difficulty performing stairs (down>up). Treatment began with flexibility exercises for the Achilles and hamstrings, which required minimal direction to prevent knee hyperextension. Achilles dynamic stretching and strengthening were facilitated with leg press with Airex. Increased fatigue was noted, with no change in pain. Knee and ankle eccentric control progressed with 4\" ht step; unsteadiness and frequent fatigue were reported after 5-6 reps. Static and dynamic balance progressed on bosu, increased challenge noted while performing fwd/ bwd balancing compared to lateral balancing with feeling of tightness " on L side. HEP reviewed with the patient, and the patient verbalized understanding. The patient is advised to cont Hep for 2-3 weeks and follow up after 2 - 3 weeks for progress and improvement. The patient brought the toe brace for Hallux valgus; it was adjusted and applied. The patient walked a few steps with that and felt comfortable with no change in pain or any complaints; the patient is advised to continue wearing it and adjust as tolerated and needed.     Plan:  Cont HEP , follow up after 2 - 3 weeks for progress and improvement.     HEP:   CALF STRETCH ON STEP - SOLEUS     Page 1 of 2 6 Exercises Start by standing on a step with the target heel off the edge.  Next, lower your heel towards the floor until a gentle stretch is felt along the back of your lower leg. Your toes on both feet should pointed straight forward. Keep the target knee bent during this stretch. Repeat 5 Times Complete 1 Set Heel Raise Hold 15 Seconds Perform 2 Times a Day Place tennis ball between your ankles and squeeze. Raise up on your toes as you lift your heels off the ground. Repeat 10 Times Complete 2 Sets Hold 3 Seconds Perform 2 Times a Day STANDING HEEL RAISES WITH FREE WEIGHTS - CALF RAISES - SINGLE LEG While standing and holding free weights balance on one leg. Next, raise up on your toes as you lift your heel off the ground. Lower back down and repeat. Repeat 10 Times Complete 2 Sets Hold 3 Seconds Perform 2 Times a DaySTEP DOWN - FORWARD - UNSTABLE Start with both feet on top of a step/box and on top of an unstable surface such as a foam pad. Next, slowly lower the unaffected leg down forward off the step/box to lightly touch the heel to the floor. Then return to the original position with both feet on the step/box. Maintain proper knee alignment: Knee in line with the 2nd toe and not passing in front of the toes. Repeat 10 Times Complete 2 Sets STEP DOWN - LATERAL Hold 3 Seconds Perform 2 Times a Day Start with both feet on  "top of a step/box. Next, slowly lower the unaffected leg down off the side of the step/box to lightly touch the heel to the floor. Then return to the original position with both feet on the step/box. Maintain proper knee alignment: Knee in line with the 2nd toe and not passing in front of the toes. Repeat 10 Times Complete 2 Sets Hold 3 Seconds Perform 2 Times a Day BALL STM - PLANTAR FASCIA While seated, place a small ball under the arch of your foot and press into it while rolling it around. Use this form of self-soft tissue massage technique for the arch of the foot. Repeat 5 Times Complete 1 Set Created by Chacha Smiley Hold 10 Seconds Perform 1 Time a Day  ***  Forward Step Down  - Step down with your unaffected leg and tap your heel on the floor before returning. You can use a hand hold to help if needed.   - Repeat 10 Times Complete 2 Sets Hold 3 Seconds Perform 2 Times a Day   Forward Step Ups   - Forward step up with Leg Drivers Step up onto a 2 - 4\"\" step with one foot. With the opposite leg drive the knee up towards your chest. Return both feet to the floor and repeat.   - Repeat 10 Times Complete 2 Sets Hold 3 Seconds Perform 2 Times a Day  "

## 2025-03-25 ENCOUNTER — OFFICE VISIT (OUTPATIENT)
Dept: ORTHOPEDIC SURGERY | Facility: CLINIC | Age: 55
End: 2025-03-25
Payer: MEDICARE

## 2025-03-25 DIAGNOSIS — S92.352D CLOSED FRACTURE OF FIFTH METATARSAL BONE OF LEFT FOOT WITH ROUTINE HEALING, SUBSEQUENT ENCOUNTER: Primary | ICD-10-CM

## 2025-03-25 PROCEDURE — 99213 OFFICE O/P EST LOW 20 MIN: CPT | Performed by: ORTHOPAEDIC SURGERY

## 2025-03-25 NOTE — PROGRESS NOTES
This is a pleasant 54 y.o. year old female who presents for fuv of left foot.  She is doing well with PT, no pain, WBAT in tennis shoes. Doing HEP.     PHYSICAL EXAMINATION  Constitutional Exam: patient's height and weight reviewed, well-kempt  Psychiatric Exam: alert and oriented x 3, appropriate mood and behavior  Eye Exam: MANE, EOMI  Pulmonary Exam: breathing non-labored, no apparent distress  Lymphatic exam: no appreciable lymphadenopathy in the lower extremities  Cardiovascular exam: DP pulses 2+ bilaterally, PT 2+ bilaterally, toes are pink with good capillary refill, no pitting edema  Skin exam: no open lesions, rashes, abrasions or ulcerations  Neurological exam: sensation to light touch intact in both lower extremities in peripheral and dermatomal distributions (except for any abnormalities noted in musculoskeletal exam)    Musculoskeletal exam: left foot: 5th metatarsal nontender to palpation, good ROM 5th MTP joint, motor strength 5/5 DF/PF/IV/EV. Good balance.         ASSESSMENT: healed 5th metatarsal neck and shaft fracture left foot  PLAN: Further treatment options discussed including finishing up PT, continue WBAT in tennis shoes. Patient is ambulatory and was given a prescription for orthotics, which are medically necessary due to the patient's medical condition and symptomatic posterior tibial tendon dysfunction.  Patient was given a handout and instructed on an at home stretching program.  They should do these exercises 3 times per day for 6 weeks and then daily. Patient can use OTC Voltaren gel, biofreeze or  aspercream for pain and continue to ice and elevate supported at the calf to reduce swelling. All the patient's questions were answered. The patient agrees with the above plan.  Follow up as needed      Note dictated with [employer] software, completed without full type editing to avoid delay.

## 2025-03-25 NOTE — PATIENT INSTRUCTIONS
To help support your foot, you can purchase inserts over the counter. You want to look for full length inserts with a foam arch (not flat gel ones). Brands such as [employer] or others work well. Full length inserts give a little more support than the short ones. But, if you use a full length insert, you often will have to remove the insole that came with the shoe and then put the insert you buy then in the shoe    1. Follow stretching exercises that were on a separate handout   2. Hold each stretch for a least 1 minute  3. Do not bounce while stretching  4. Stretch for 10 minutes at a time, 3x a day for 6 weeks then daily  5. Remember, it takes several weeks to a few months of consistent stretching to increase flexibility and decrease symptoms.     You can use OTC Voltaren gel or aspercream and apply it to the injured area.    Ice and elevate supported at the calf with no pressure on the heel to reduce swelling.    Follow up as needed

## 2025-03-28 ENCOUNTER — APPOINTMENT (OUTPATIENT)
Dept: PAIN MEDICINE | Facility: CLINIC | Age: 55
End: 2025-03-28
Payer: MEDICARE

## 2025-04-01 ENCOUNTER — HOSPITAL ENCOUNTER (OUTPATIENT)
Dept: PAIN MEDICINE | Facility: CLINIC | Age: 55
Discharge: HOME | End: 2025-04-01
Payer: MEDICARE

## 2025-04-01 VITALS
OXYGEN SATURATION: 96 % | HEART RATE: 67 BPM | RESPIRATION RATE: 18 BRPM | DIASTOLIC BLOOD PRESSURE: 66 MMHG | SYSTOLIC BLOOD PRESSURE: 109 MMHG

## 2025-04-01 DIAGNOSIS — M54.42 CHRONIC MIDLINE LOW BACK PAIN WITH LEFT-SIDED SCIATICA: ICD-10-CM

## 2025-04-01 DIAGNOSIS — G89.29 CHRONIC MIDLINE LOW BACK PAIN WITH LEFT-SIDED SCIATICA: ICD-10-CM

## 2025-04-01 PROCEDURE — C1894 INTRO/SHEATH, NON-LASER: HCPCS

## 2025-04-01 PROCEDURE — 2720000007 HC OR 272 NO HCPCS

## 2025-04-01 PROCEDURE — 62370 ANL SP INF PMP W/MDREPRG&FIL: CPT | Performed by: PAIN MEDICINE

## 2025-04-01 ASSESSMENT — PAIN - FUNCTIONAL ASSESSMENT: PAIN_FUNCTIONAL_ASSESSMENT: 0-10

## 2025-04-01 ASSESSMENT — PAIN SCALES - GENERAL: PAINLEVEL_OUTOF10: 7

## 2025-04-01 NOTE — DISCHARGE INSTRUCTIONS
INTRATHECAL PAIN PUMP REFILL DISCHARGE INSTRUCTIONS        DO NOT GET INJECTION SITE WET FOR 24 HOURS  IF BANDAGE HAS BEEN PLACED YOU MAY REMOVE AFTER 24 HOURS  MONITOR FOR SIGNS/SYMPTOMS OF INFECTION:FEVERS REDNESS OR INCREASED PAIN AT INJECTION SITE   YOU MAY RESUME YOUR NORMAL ACTIVITY       IF SIGNS OR SYMPTOMS OF OPIATE OVERDOSE ARE NOTED AFTER YOUR REFILL INCLUDING UNUSUAL SLEEPINESS, UNRESPONSIVENESS, SLOW OR ABSENT BREATHING ADMINISTER NARCAN AS DIRECTED AND CALL 911    BEFORE YOU LEAVE OUR OFFICE PLEASE SCHEDULE YOUR NEXT APPOINTMENT TO SCHEDULE YOUR NEXT REFILL APPOINTMENT-IT IS IMPORTANT TO KEEP YOUR APPOINTMENTS SO THAT YOUR PUMP DOES NOT RUN OUT OF MEDICATION AS THIS WILL DAMAGE YOUR PUMP    SHOULD YOU HAVE ANY QUESTIONS OR CONCERNS PLEASE CALL THE OFFICE  [phone]

## 2025-04-04 ENCOUNTER — APPOINTMENT (OUTPATIENT)
Dept: PHYSICAL THERAPY | Facility: CLINIC | Age: 55
End: 2025-04-04
Payer: MEDICARE

## 2025-04-09 ENCOUNTER — OFFICE VISIT (OUTPATIENT)
Dept: PAIN MEDICINE | Facility: CLINIC | Age: 55
End: 2025-04-09
Payer: MEDICARE

## 2025-04-09 VITALS — SYSTOLIC BLOOD PRESSURE: 158 MMHG | TEMPERATURE: 96.8 F | DIASTOLIC BLOOD PRESSURE: 86 MMHG | HEART RATE: 68 BPM

## 2025-04-09 DIAGNOSIS — M70.62 GREATER TROCHANTERIC BURSITIS OF BOTH HIPS: Primary | ICD-10-CM

## 2025-04-09 DIAGNOSIS — G89.29 CHRONIC HIP PAIN, BILATERAL: ICD-10-CM

## 2025-04-09 DIAGNOSIS — M25.552 CHRONIC HIP PAIN, BILATERAL: ICD-10-CM

## 2025-04-09 DIAGNOSIS — M70.61 GREATER TROCHANTERIC BURSITIS OF BOTH HIPS: Primary | ICD-10-CM

## 2025-04-09 DIAGNOSIS — M25.551 CHRONIC HIP PAIN, BILATERAL: ICD-10-CM

## 2025-04-09 PROCEDURE — 99213 OFFICE O/P EST LOW 20 MIN: CPT

## 2025-04-09 PROCEDURE — 3079F DIAST BP 80-89 MM HG: CPT

## 2025-04-09 PROCEDURE — 3077F SYST BP >= 140 MM HG: CPT

## 2025-04-09 ASSESSMENT — PAIN - FUNCTIONAL ASSESSMENT: PAIN_FUNCTIONAL_ASSESSMENT: 0-10

## 2025-04-09 ASSESSMENT — PAIN DESCRIPTION - DESCRIPTORS: DESCRIPTORS: ACHING;SHARP

## 2025-04-09 ASSESSMENT — PAIN SCALES - GENERAL: PAINLEVEL_OUTOF10: 7

## 2025-04-09 NOTE — PROGRESS NOTES
Subjective   Patient ID: Samina Celeste is a 54 y.o. female who presents for Follow-up (Patient here to discuss getting bilateral hip injections, states bilateral hip pain is 7/10).  HPI    53 yo female presents today to discuss hip injections. She is reporting worsening of bilateral hip pain over the past few months. Her last bilateral hip injection was in September 2024. She reports 80% pain relief for 3 months from the hip injection. She reports that she is able to move around more following the injection. Denies any recent falls or injuries. Denies blood thinner use.     Review of Systems  All 13 systems were reviewed and are within normal levels except as noted below or per HPI. Positive and pertinent negative responses are noted below or in the HPI   Denied any fever or chills. No weight loss and no night sweats. No cough or sputum production. No diarrhea   Denies constipation.   No bladder and bowel incontinence and no other changes in bladder and bowel. No skin changes. Reports tiredness and fatigability only if the pain is not controlled.   Denied opioids diversion and abuse and denies alcoholism. Denies overuse of the pain medications.      Objective   Physical Exam  General   Alert and oriented x4, pleasant and cooperative.      HEENT  Pupils are equal and normal in size. Ears, nose, mouth, and throat appear to be WNL.  Head atraumatic, symmetric.      No signs of sedation or signs of withdrawal apparent.     Psychiatric   No signs of depression apparent. Appropriate mood and affect.     Neuro   No focal neurological deficit apparent. Ambulation at baseline.      Respiratory  No respiratory distress, respirations equal and unlabored.     Abdomen  Soft and nontender, no distention noted.     Skin  Warm, dry and intact. No skin markings supportive of recent IV drug usage .      Extremities  No apparent deformity of bilateral hips. Able to bear weight through lower extremities.       Assessment/Plan      55 yo female with chronic bilateral hip pain associated with greater trochanteric bursitis.     Schedule steroid injection of the bilateral greater trochanteric bursa.     Follow up in May for medication refills.     Explained plan to this patient, and patient verbalized understanding and agreement with the plan.     Please do not hesitate to contact the pain clinic after your visit with any questions or concerns at  M-F 8-4 pm     Patient was reminded not to share medications, not to take prescription medications that were not prescribed to the patient, and not to increase or change dose without consulting the pain clinic. I advised the patient to always take the least amount of medication needed to keep symptoms under control.          Nikole Grimes, DENNISE-CNP 04/09/25 10:13 AM

## 2025-04-18 ENCOUNTER — APPOINTMENT (OUTPATIENT)
Dept: PHYSICAL THERAPY | Facility: CLINIC | Age: 55
End: 2025-04-18
Payer: MEDICARE

## 2025-05-02 ENCOUNTER — HOSPITAL ENCOUNTER (OUTPATIENT)
Dept: RADIOLOGY | Facility: CLINIC | Age: 55
Discharge: HOME | End: 2025-05-02
Payer: MEDICARE

## 2025-05-02 DIAGNOSIS — Z78.0 POSTMENOPAUSAL ESTROGEN DEFICIENCY: ICD-10-CM

## 2025-05-02 DIAGNOSIS — S92.352S CLOSED DISPLACED FRACTURE OF FIFTH METATARSAL BONE OF LEFT FOOT, SEQUELA: ICD-10-CM

## 2025-05-02 PROCEDURE — 77080 DXA BONE DENSITY AXIAL: CPT

## 2025-05-06 ENCOUNTER — TELEPHONE (OUTPATIENT)
Dept: PAIN MEDICINE | Facility: CLINIC | Age: 55
End: 2025-05-06
Payer: MEDICARE

## 2025-05-06 ENCOUNTER — APPOINTMENT (OUTPATIENT)
Dept: PAIN MEDICINE | Facility: CLINIC | Age: 55
End: 2025-05-06
Payer: MEDICARE

## 2025-05-06 NOTE — TELEPHONE ENCOUNTER
Patient called in to cancel her injection, stated she had family emergency and will call back to reschedule

## 2025-05-13 DIAGNOSIS — E03.9 HYPOTHYROIDISM, UNSPECIFIED TYPE: ICD-10-CM

## 2025-05-13 RX ORDER — LEVOTHYROXINE SODIUM 25 UG/1
TABLET ORAL
Qty: 90 TABLET | Refills: 2 | Status: SHIPPED | OUTPATIENT
Start: 2025-05-13

## 2025-05-20 ENCOUNTER — TELEMEDICINE (OUTPATIENT)
Dept: PRIMARY CARE | Facility: CLINIC | Age: 55
End: 2025-05-20
Payer: MEDICARE

## 2025-05-20 DIAGNOSIS — Z63.4 BEREAVEMENT: Primary | ICD-10-CM

## 2025-05-20 DIAGNOSIS — F41.9 ANXIETY: ICD-10-CM

## 2025-05-20 PROCEDURE — 1036F TOBACCO NON-USER: CPT | Performed by: FAMILY MEDICINE

## 2025-05-20 PROCEDURE — 99213 OFFICE O/P EST LOW 20 MIN: CPT | Performed by: FAMILY MEDICINE

## 2025-05-20 RX ORDER — ALPRAZOLAM 0.25 MG/1
0.25 TABLET ORAL 3 TIMES DAILY PRN
Qty: 15 TABLET | Refills: 0 | Status: SHIPPED | OUTPATIENT
Start: 2025-05-20 | End: 2026-01-15

## 2025-05-20 SDOH — SOCIAL STABILITY - SOCIAL INSECURITY: DISSAPEARANCE AND DEATH OF FAMILY MEMBER: Z63.4

## 2025-05-20 NOTE — PATIENT INSTRUCTIONS
I ordered alprazolam to help calm your anxiety.  Continue the duloxetine and bupropion, which may help with anxiety and depression.  It was discussed that alprazolam should not be taken with opioids such as Dilaudid.  I recommend that you stop the Dilaudid if you are going ot be on alprazolam.

## 2025-05-20 NOTE — PROGRESS NOTES
"Subjective   Patient ID: [id number]     Virtual or Telephone Consent    An interactive audio and video telecommunication system which permits real time communications between the patient (at the originating site) and provider (at the distant site) was utilized to provide this telehealth service.   Verbal consent was requested and obtained from Samina Celeste on this date, 05/20/25 for a telehealth visit and the patient's location was confirmed at the time of the visit.    Samina Celeste is a 54 y.o. female who presents for anxiety.  HPI  She complains of feelings of anxiety.  Her  passed away two weeks ago.  Her mom just became ill and is placed in a nursing home.  She has to take over her finances and that is stressful.  Her stepson is also in the hospital.     She has had bad anxiety over the last two weeks where she can't sleep or focus and is running around.      She has bad stress lately.      She is overwhelmed and it is \"to the point that I can't do anything\".      She is very anxious \"like something else is going to happen too.      No suicidal ideation.  \"None  at all\".         Objective     There were no vitals taken for this visit.     Physical Exam  Neurological:      Mental Status: She is alert.   Psychiatric:      Comments: Tearful, grieving.  No suicidal ideation.           Assessment/Plan   Problem List Items Addressed This Visit       Anxiety    Relevant Medications    ALPRAZolam (Xanax) 0.25 mg tablet     Other Visit Diagnoses         Bereavement    -  Primary    Relevant Medications    ALPRAZolam (Xanax) 0.25 mg tablet        I ordered alprazolam to help calm your anxiety.  Continue the duloxetine and bupropion, which may help with anxiety and depression.  It was discussed that alprazolam should not be taken with opioids such as Dilaudid.  I recommend that you stop the Dilaudid if you are going ot be on alprazolam.      Noe Velazquez, DO   "

## 2025-05-23 DIAGNOSIS — F41.9 ANXIETY: ICD-10-CM

## 2025-05-23 RX ORDER — DULOXETIN HYDROCHLORIDE 60 MG/1
60 CAPSULE, DELAYED RELEASE ORAL 2 TIMES DAILY
Qty: 180 CAPSULE | Refills: 0 | Status: SHIPPED | OUTPATIENT
Start: 2025-05-23

## 2025-05-28 ENCOUNTER — OFFICE VISIT (OUTPATIENT)
Dept: PAIN MEDICINE | Facility: CLINIC | Age: 55
End: 2025-05-28
Payer: MEDICARE

## 2025-05-28 VITALS
SYSTOLIC BLOOD PRESSURE: 131 MMHG | DIASTOLIC BLOOD PRESSURE: 63 MMHG | RESPIRATION RATE: 18 BRPM | HEART RATE: 78 BPM | OXYGEN SATURATION: 97 %

## 2025-05-28 DIAGNOSIS — G89.29 CHRONIC MIDLINE LOW BACK PAIN WITH LEFT-SIDED SCIATICA: ICD-10-CM

## 2025-05-28 DIAGNOSIS — M54.42 CHRONIC MIDLINE LOW BACK PAIN WITH LEFT-SIDED SCIATICA: ICD-10-CM

## 2025-05-28 PROCEDURE — 99213 OFFICE O/P EST LOW 20 MIN: CPT

## 2025-05-28 PROCEDURE — 3075F SYST BP GE 130 - 139MM HG: CPT

## 2025-05-28 PROCEDURE — 3078F DIAST BP <80 MM HG: CPT

## 2025-05-28 RX ORDER — HYDROMORPHONE HYDROCHLORIDE 4 MG/1
4 TABLET ORAL 2 TIMES DAILY PRN
Qty: 60 TABLET | Refills: 0 | Status: SHIPPED | OUTPATIENT
Start: 2025-08-04 | End: 2025-09-03

## 2025-05-28 RX ORDER — HYDROMORPHONE HYDROCHLORIDE 4 MG/1
4 TABLET ORAL 2 TIMES DAILY PRN
Qty: 60 TABLET | Refills: 0 | Status: SHIPPED | OUTPATIENT
Start: 2025-06-05 | End: 2025-07-05

## 2025-05-28 RX ORDER — HYDROMORPHONE HYDROCHLORIDE 4 MG/1
4 TABLET ORAL 2 TIMES DAILY PRN
Qty: 60 TABLET | Refills: 0 | Status: SHIPPED | OUTPATIENT
Start: 2025-07-05 | End: 2025-08-04

## 2025-05-28 ASSESSMENT — PAIN SCALES - GENERAL
PAINLEVEL_OUTOF10: 7
PAINLEVEL_OUTOF10: 7

## 2025-05-28 ASSESSMENT — PAIN DESCRIPTION - DESCRIPTORS: DESCRIPTORS: DULL;ACHING

## 2025-05-28 ASSESSMENT — PAIN - FUNCTIONAL ASSESSMENT: PAIN_FUNCTIONAL_ASSESSMENT: 0-10

## 2025-05-28 NOTE — PROGRESS NOTES
Subjective   Patient ID: Samina Celeste is a 54 y.o. female who presents for Med Refill.  HPI    53 yo female presents today for medication refills. She is known to this clinic for chronic low back pain. She is maintained on hydromorphone 4mg twice daily for breakthrough pain and has intrathecal morphine pump for basal coverage. She confirms that the medication is still effective. Pain today is 7/10 in the low back. The patient denies any side effects associated with the usage of the medication patient described the medication improving the quality of life and allowing participation in day-to-day activity patient denies otherwise any new complaint patient is requesting a refill. Patient denies usage of any other opiate. Patient denies usage of any recreational drugs. Patient confirms that the opiate prescription is being used as prescribed. Her  passed away recently, she has family support. She was given a 5 day prescription for Xanax by her PCP to take as needed for anxiety. She states that she is not taking this with her hydromorphone and has not experienced any sedative side effects.       Review of Systems  All 13 systems were reviewed and are within normal levels except as noted below or per HPI. Positive and pertinent negative responses are noted below or in the HPI   Denied any fever or chills. No weight loss and no night sweats. No cough or sputum production. No diarrhea   Denies constipation.   No bladder and bowel incontinence and no other changes in bladder and bowel. No skin changes. Reports tiredness and fatigability only if the pain is not controlled.   Denied opioids diversion and abuse and denies alcoholism. Denies overuse of the pain medications.      Objective   Physical Exam  General   Alert and oriented x4, pleasant and cooperative.      HEENT  Pupils are equal and normal in size. Ears, nose, mouth, and throat appear to be WNL.  Head atraumatic, symmetric.      No signs of sedation or  signs of withdrawal apparent.     Psychiatric   No signs of depression apparent. Appropriate mood and affect.     Neuro   No focal neurological deficit apparent. Ambulation at baseline.      Respiratory  No respiratory distress, respirations equal and unlabored.     Abdomen  Soft and nontender, no distention noted.     Skin  Warm, dry and intact. No skin markings supportive of recent IV drug usage .      Assessment/Plan     53 yo female with chronic low back pain associated with lumbar postlaminectomy syndrome.     I have personally reviewed the [facility] report for this patient. I have considered the risks of abuse, dependence, addiction and diversion. I believe that it is clinically appropriate for this patient to be prescribed this medication based on documented diagnosis.  I believe the benefits of the continuation of the opiate outweighs the risk. Patient has described positive response to the present medication therapy. Patient denies any side effects associated with the usage of the medication. Patient did not elicit any signs supportive of misuse or abuse. The review of the [place] [employer] Reporting prescription is not suggestive of any worrisome pattern. Patient believes the usage of this medication has improved the quality of life and allow him to participate in day-to-day activity. Therefore  I recommend the continuation of this medication and I will be refilling the medication prescription.    The patient was counseled regarding diagnostic results, instructions for management, risk factor reductions, prognosis, patient and family education, impressions, risks and benefits of treatment options and importance of compliance with treatment.     Continue to take hydromorphone as prescribed.     Has narcan kit at home.     Intrathecal pump refill on 06/19/2025.     Follow up in 3 months or as needed.     Explained plan to this patient, and patient verbalized understanding and agreement with the plan.     Please do  not hesitate to contact the pain clinic after your visit with any questions or concerns at  M-F 8-4 pm     Patient was reminded not to share medications, not to take prescription medications that were not prescribed to the patient, and not to increase or change dose without consulting the pain clinic. I advised the patient to always take the least amount of medication needed to keep symptoms under control.          Nikole Grimes, DENNISE-CNP 05/28/25 2:35 PM    no

## 2025-05-29 ENCOUNTER — TELEPHONE (OUTPATIENT)
Dept: PAIN MEDICINE | Facility: CLINIC | Age: 55
End: 2025-05-29
Payer: MEDICARE

## 2025-06-03 ENCOUNTER — APPOINTMENT (OUTPATIENT)
Dept: PRIMARY CARE | Facility: CLINIC | Age: 55
End: 2025-06-03
Payer: MEDICARE

## 2025-06-03 VITALS
SYSTOLIC BLOOD PRESSURE: 130 MMHG | WEIGHT: 180 LBS | BODY MASS INDEX: 31.89 KG/M2 | TEMPERATURE: 97.2 F | DIASTOLIC BLOOD PRESSURE: 80 MMHG

## 2025-06-03 DIAGNOSIS — F41.9 ANXIETY: Primary | ICD-10-CM

## 2025-06-03 DIAGNOSIS — G47.00 INSOMNIA, UNSPECIFIED TYPE: ICD-10-CM

## 2025-06-03 PROCEDURE — 3075F SYST BP GE 130 - 139MM HG: CPT | Performed by: FAMILY MEDICINE

## 2025-06-03 PROCEDURE — 1036F TOBACCO NON-USER: CPT | Performed by: FAMILY MEDICINE

## 2025-06-03 PROCEDURE — 99213 OFFICE O/P EST LOW 20 MIN: CPT | Performed by: FAMILY MEDICINE

## 2025-06-03 PROCEDURE — 3079F DIAST BP 80-89 MM HG: CPT | Performed by: FAMILY MEDICINE

## 2025-06-03 RX ORDER — TRAZODONE HYDROCHLORIDE 50 MG/1
50 TABLET ORAL NIGHTLY PRN
Qty: 30 TABLET | Refills: 0 | Status: SHIPPED | OUTPATIENT
Start: 2025-06-03 | End: 2026-06-03

## 2025-06-03 RX ORDER — BUPROPION HYDROCHLORIDE 300 MG/1
300 TABLET ORAL EVERY MORNING
Qty: 90 TABLET | Refills: 1 | Status: SHIPPED | OUTPATIENT
Start: 2025-06-03

## 2025-06-03 RX ORDER — DULOXETIN HYDROCHLORIDE 60 MG/1
60 CAPSULE, DELAYED RELEASE ORAL 2 TIMES DAILY
Qty: 180 CAPSULE | Refills: 1 | Status: SHIPPED | OUTPATIENT
Start: 2025-06-03

## 2025-06-03 NOTE — PROGRESS NOTES
"Subjective   Patient ID: [id number]     Samina Celeste is a 54 y.o. female who presents for Follow-up and Med Refill.  HPI  She is here for a recheck.  She requests refills on medications--duloxetine and bupropion.      She was seen virturally on 5/20/25 after her  passed away.  She was prescribed a short supply of alprazolam.    She is \"functioning somewhat\"  she is having a hard time sleeping, both falling asleep and staying asleep.      She has been up until 4 am most nights.     She has been pretty tired.     She has not taken all of the alprazolam and does not feel that she needs any more.    Also, her mom is in a nursing home and she has to move her.  And that has been stressful.      She denies any side effects to the bupropion or duloxetine.    Denies suicidal ideation.  Objective     /80   Temp 36.2 °C (97.2 °F) (Skin)   Wt 81.6 kg (180 lb)   BMI 31.89 kg/m²      Physical Exam  Constitutional:       Appearance: Normal appearance.   Cardiovascular:      Rate and Rhythm: Normal rate and regular rhythm.      Heart sounds: Normal heart sounds. No murmur heard.  Pulmonary:      Effort: No respiratory distress.      Breath sounds: Normal breath sounds.   Neurological:      Mental Status: She is alert.   Psychiatric:         Behavior: Behavior normal.         Thought Content: Thought content normal.      Comments: Sad affect. Grieving .           Assessment/Plan   Problem List Items Addressed This Visit       Anxiety - Primary    Relevant Medications    DULoxetine (Cymbalta) 60 mg DR capsule    buPROPion XL (Wellbutrin XL) 300 mg 24 hr tablet    Insomnia    Relevant Medications    traZODone (Desyrel) 50 mg tablet     I refilled your duloxetine and bupropion.  I added trazodone to try to help you with sleep.  Return in one month for a recheck on insomnia if it persists.   Noe Velazquez, DO   "

## 2025-06-03 NOTE — PATIENT INSTRUCTIONS
I refilled your duloxetine and bupropion.  I added trazodone to try to help you with sleep.  Return in one month for a recheck on insomnia if it persists.

## 2025-06-19 ENCOUNTER — APPOINTMENT (OUTPATIENT)
Dept: PAIN MEDICINE | Facility: CLINIC | Age: 55
End: 2025-06-19
Payer: MEDICARE

## 2025-06-19 ENCOUNTER — HOSPITAL ENCOUNTER (OUTPATIENT)
Dept: PAIN MEDICINE | Facility: CLINIC | Age: 55
Discharge: HOME | End: 2025-06-19
Payer: MEDICARE

## 2025-06-19 VITALS
DIASTOLIC BLOOD PRESSURE: 80 MMHG | TEMPERATURE: 95.5 F | HEART RATE: 69 BPM | RESPIRATION RATE: 18 BRPM | OXYGEN SATURATION: 95 % | SYSTOLIC BLOOD PRESSURE: 131 MMHG

## 2025-06-19 DIAGNOSIS — M54.42 CHRONIC MIDLINE LOW BACK PAIN WITH LEFT-SIDED SCIATICA: ICD-10-CM

## 2025-06-19 DIAGNOSIS — G89.29 CHRONIC MIDLINE LOW BACK PAIN WITH LEFT-SIDED SCIATICA: ICD-10-CM

## 2025-06-19 PROCEDURE — 62370 ANL SP INF PMP W/MDREPRG&FIL: CPT | Performed by: PAIN MEDICINE

## 2025-06-19 PROCEDURE — 2720000007 HC OR 272 NO HCPCS

## 2025-06-19 PROCEDURE — C1894 INTRO/SHEATH, NON-LASER: HCPCS

## 2025-06-19 ASSESSMENT — PAIN SCALES - GENERAL: PAINLEVEL_OUTOF10: 7

## 2025-06-19 ASSESSMENT — PAIN - FUNCTIONAL ASSESSMENT: PAIN_FUNCTIONAL_ASSESSMENT: 0-10

## 2025-06-19 NOTE — DISCHARGE INSTRUCTIONS
INTRATHECAL PAIN PUMP REFILL DISCHARGE INSTRUCTIONS        DO NOT GET INJECTION SITE WET FOR 24 HOURS  IF BANDAGE HAS BEEN PLACED YOU MAY REMOVE AFTER 24 HOURS  MONITOR FOR SIGNS/SYMPTOMS OF INFECTION:FEVERS REDNESS OR INCREASED PAIN AT INJECTION SITE   YOU MAY RESUME YOUR NORMAL ACTIVITY       IF SIGNS OR SYMPTOMS OF OPIATE OVERDOSE ARE NOTED AFTER YOUR REFILL INCLUDING UNUSUAL SLEEPINESS, UNRESPONSIVENESS, SLOW OR ABSENT BREATHING ADMINISTER NARCAN AS DIRECTED AND CALL 911    BEFORE YOU LEAVE OUR OFFICE PLEASE SCHEDULE YOUR NEXT APPOINTMENT TO SCHEDULE YOUR NEXT REFILL APPOINTMENT-IT IS IMPORTANT TO KEEP YOUR APPOINTMENTS SO THAT YOUR PUMP DOES NOT RUN OUT OF MEDICATION AS THIS WILL DAMAGE YOUR PUMP    SHOULD YOU HAVE ANY QUESTIONS OR CONCERNS PLEASE CALL THE OFFICE  [phone]

## 2025-06-26 ENCOUNTER — HOSPITAL ENCOUNTER (OUTPATIENT)
Dept: PAIN MEDICINE | Facility: CLINIC | Age: 55
Discharge: HOME | End: 2025-06-26
Payer: MEDICARE

## 2025-06-26 VITALS
TEMPERATURE: 97.8 F | SYSTOLIC BLOOD PRESSURE: 122 MMHG | DIASTOLIC BLOOD PRESSURE: 77 MMHG | RESPIRATION RATE: 20 BRPM | HEART RATE: 65 BPM | OXYGEN SATURATION: 93 %

## 2025-06-26 DIAGNOSIS — M70.62 GREATER TROCHANTERIC BURSITIS OF BOTH HIPS: ICD-10-CM

## 2025-06-26 DIAGNOSIS — M70.61 GREATER TROCHANTERIC BURSITIS OF BOTH HIPS: ICD-10-CM

## 2025-06-26 PROCEDURE — 2500000004 HC RX 250 GENERAL PHARMACY W/ HCPCS (ALT 636 FOR OP/ED): Mod: JZ | Performed by: PAIN MEDICINE

## 2025-06-26 PROCEDURE — 20610 DRAIN/INJ JOINT/BURSA W/O US: CPT | Mod: 50 | Performed by: PAIN MEDICINE

## 2025-06-26 PROCEDURE — 20610 DRAIN/INJ JOINT/BURSA W/O US: CPT | Performed by: PAIN MEDICINE

## 2025-06-26 RX ORDER — METHYLPREDNISOLONE ACETATE 80 MG/ML
INJECTION, SUSPENSION INTRA-ARTICULAR; INTRALESIONAL; INTRAMUSCULAR; SOFT TISSUE AS NEEDED
Status: COMPLETED | OUTPATIENT
Start: 2025-06-26 | End: 2025-06-26

## 2025-06-26 RX ORDER — BUPIVACAINE HYDROCHLORIDE 2.5 MG/ML
INJECTION, SOLUTION EPIDURAL; INFILTRATION; INTRACAUDAL; PERINEURAL AS NEEDED
Status: COMPLETED | OUTPATIENT
Start: 2025-06-26 | End: 2025-06-26

## 2025-06-26 RX ADMIN — BUPIVACAINE HYDROCHLORIDE 10 ML: 2.5 INJECTION, SOLUTION EPIDURAL; INFILTRATION; INTRACAUDAL; PERINEURAL at 13:13

## 2025-06-26 RX ADMIN — METHYLPREDNISOLONE ACETATE 80 MG: 80 INJECTION, SUSPENSION INTRA-ARTICULAR; INTRALESIONAL; INTRAMUSCULAR; SOFT TISSUE at 13:13

## 2025-06-26 NOTE — DISCHARGE INSTRUCTIONS
Corticosteroid Joint Injection    Why is this procedure done?  Your doctor may give you a shot of a special medicine called a corticosteroid into your joint area, to help ease swelling and pain in the joint. Your joints are places where 2 or more bones meet. You may have an injury or swelling that limits your movement and causes you pain.  What will the results be?  The treatment may:  Lower pain  Reduce swelling in the muscles, nerves, and other tissues  Improve movement  What happens before the procedure?  Your doctor will ask you about your health history. Talk to the doctor about:  All the drugs you are taking. Be sure to include all prescription and over-the-counter (OTC) drugs, vitamins, and herbal supplements. Tell the doctor about any drug allergy. Bring a list of drugs you take with you.  If you have high blood sugar or diabetes. Your drugs may need to be changed.  If you have had another steroid injection within the past 6 weeks or if you have had more than 3 injections into the same area within the past 12 months.  Any bleeding problems. Be sure to tell your doctor if you are taking any drugs that may cause bleeding. Some of these are warfarin, rivaroxaban, apixaban, ticagrelor, clopidogrel, ketorolac, ibuprofen, naproxen, or aspirin. Certain vitamins and herbs, such as garlic and fish oil, may also add to the risk for bleeding. You may need to stop these drugs as well. Talk to your doctor about them.  If you have high blood pressure.  If you have had any allergic reactions to steroids before.  What happens during the procedure?  Your doctor may do an x-ray of your joint or use x-ray during the procedure to check where to give the drug. The doctor may also use ultrasound to check. The doctor may also use a colored dye or a contrast dye to check where to inject the drug.  The doctor will clean the skin over your joint. This helps prevent infection.  You may be given a drug to help you relax. Your doctor  may numb the site of the injection.  The drug will be injected into a space in or near the joint.  The needle will be taken out and a bandage will be placed over the injection site.  What happens after the procedure?  Staff will check on you to make sure you are doing well. They will tell you when you can go home.  What care is needed at home?  Relax on the day of the injection.  Avoid heavy activity with the joint that was injected for a few days.  Apply ice to the injection site. Place an ice pack or a bag of frozen peas wrapped in a towel over the painful part. Never put ice right on the skin. Do not leave the ice on more than 10 to 15 minutes at a time.  It may take few days before you will see the effects of the injection.  What follow-up care is needed?  Your doctor may ask you to make visits to the office to check on your progress. Be sure to keep these visits. You may also need to see a physical therapist (PT). The PT will teach you exercises to help you get back your strength and motion. Ask your doctor when you can exercise.  What problems could happen?  Bleeding or bruising  Soreness at the injection site  Infection  May elevate blood pressure  Cartilage damage  Whitening or lightening of the skin around the injection site  Nerve injury  Tendon rupture  Thinning of the skin and bone around where the injection was given  If you are diabetic, your blood sugars may increase for several days    Your pain may not be gone immediately after the procedure--it usually takes the steroid 3-5 days to start working.   It may take several weeks for the medicine to reach its' full effect.   Pay attention to how much pain relief (what percentage compared to before the procedure) you get and for how long it lasts.     Activity: Avoid strenuous activity for 24 hours. After that return to your normal activity level.     Bandages: Remove after 24 hours     Showering/Bathing: You may shower after bandage is removed     Follow  up: CALL OFFICE IN 7 DAYS [phone] LEAVE MESSAGE ABOUT THE RELIEF THAT WAS OBTAINED

## 2025-07-01 ENCOUNTER — APPOINTMENT (OUTPATIENT)
Dept: PRIMARY CARE | Facility: CLINIC | Age: 55
End: 2025-07-01
Payer: MEDICARE

## 2025-07-01 VITALS
TEMPERATURE: 94.8 F | BODY MASS INDEX: 31.89 KG/M2 | DIASTOLIC BLOOD PRESSURE: 84 MMHG | SYSTOLIC BLOOD PRESSURE: 136 MMHG | WEIGHT: 180 LBS

## 2025-07-01 DIAGNOSIS — Z63.4 BEREAVEMENT: ICD-10-CM

## 2025-07-01 DIAGNOSIS — G47.00 INSOMNIA, UNSPECIFIED TYPE: Primary | ICD-10-CM

## 2025-07-01 DIAGNOSIS — F41.9 ANXIETY: ICD-10-CM

## 2025-07-01 PROCEDURE — G8433 SCR FOR DEP NOT CPT DOC RSN: HCPCS | Performed by: FAMILY MEDICINE

## 2025-07-01 PROCEDURE — 3079F DIAST BP 80-89 MM HG: CPT | Performed by: FAMILY MEDICINE

## 2025-07-01 PROCEDURE — 99213 OFFICE O/P EST LOW 20 MIN: CPT | Performed by: FAMILY MEDICINE

## 2025-07-01 PROCEDURE — 1036F TOBACCO NON-USER: CPT | Performed by: FAMILY MEDICINE

## 2025-07-01 PROCEDURE — 3075F SYST BP GE 130 - 139MM HG: CPT | Performed by: FAMILY MEDICINE

## 2025-07-01 RX ORDER — ALPRAZOLAM 0.25 MG/1
0.25 TABLET ORAL 3 TIMES DAILY PRN
Qty: 15 TABLET | Refills: 0 | Status: SHIPPED | OUTPATIENT
Start: 2025-07-01 | End: 2026-02-26

## 2025-07-01 RX ORDER — TRAZODONE HYDROCHLORIDE 100 MG/1
100 TABLET ORAL NIGHTLY PRN
Qty: 30 TABLET | Refills: 1 | Status: SHIPPED | OUTPATIENT
Start: 2025-07-01 | End: 2026-07-01

## 2025-07-01 SDOH — SOCIAL STABILITY - SOCIAL INSECURITY: DISSAPEARANCE AND DEATH OF FAMILY MEMBER: Z63.4

## 2025-07-01 NOTE — PROGRESS NOTES
"Subjective   Patient ID: [id number]     Samina Celeste is a 54 y.o. female who presents for Follow-up (Discuss increasing Trazodone.).  HPI  She is here for a recheck.  She was started on trazodone 50 mg nightly for the treatment of insomnia on 6/3/25.      She states the trazodone \"definitely helps with her getting to sleep\".  She still wakes up a couple of times during the night and she sometimes has trouble getting back to sleep again.    Often she can return to sleep in a half hour but sometimes she is up for a couple of hours after waking up.    She denies any side effects to the medication.    She feels depressed.  No SI. She has for two months.       Objective     /84   Temp 34.9 °C (94.8 °F) (Skin)   Wt 81.6 kg (180 lb)   BMI 31.89 kg/m²      Physical Exam  Constitutional:       Appearance: Normal appearance.   Cardiovascular:      Rate and Rhythm: Normal rate and regular rhythm.      Heart sounds: Normal heart sounds. No murmur heard.  Pulmonary:      Effort: Pulmonary effort is normal. No respiratory distress.      Breath sounds: Normal breath sounds.   Neurological:      General: No focal deficit present.      Mental Status: She is alert and oriented to person, place, and time. Mental status is at baseline.   Psychiatric:         Behavior: Behavior normal.         Thought Content: Thought content normal.      Comments: Depressed affect.         Assessment/Plan   Problem List Items Addressed This Visit       Anxiety    Relevant Medications    ALPRAZolam (Xanax) 0.25 mg tablet    Insomnia - Primary    Relevant Medications    traZODone (Desyrel) 100 mg tablet     Other Visit Diagnoses         Bereavement        Relevant Medications    ALPRAZolam (Xanax) 0.25 mg tablet          I will increase the dose of trazodone.  Return in two months for a recheck on insomnia and your mood.   Noe Velazquez, DO   "

## 2025-07-01 NOTE — PATIENT INSTRUCTIONS
I will increase the dose of trazodone.  Return in two months for a recheck on insomnia and your mood.

## 2025-07-04 DIAGNOSIS — M35.9 UNDIFFERENTIATED CONNECTIVE TISSUE DISEASE (MULTI): ICD-10-CM

## 2025-07-07 ENCOUNTER — TELEPHONE (OUTPATIENT)
Dept: PAIN MEDICINE | Facility: CLINIC | Age: 55
End: 2025-07-07
Payer: MEDICARE

## 2025-07-07 DIAGNOSIS — M54.42 CHRONIC MIDLINE LOW BACK PAIN WITH LEFT-SIDED SCIATICA: ICD-10-CM

## 2025-07-07 DIAGNOSIS — G89.29 CHRONIC MIDLINE LOW BACK PAIN WITH LEFT-SIDED SCIATICA: ICD-10-CM

## 2025-07-07 RX ORDER — HYDROXYCHLOROQUINE SULFATE 200 MG/1
TABLET, FILM COATED ORAL DAILY
Qty: 90 TABLET | Refills: 1 | Status: SHIPPED | OUTPATIENT
Start: 2025-07-07

## 2025-07-07 RX ORDER — HYDROMORPHONE HYDROCHLORIDE 4 MG/1
4 TABLET ORAL 2 TIMES DAILY PRN
Qty: 60 TABLET | Refills: 0 | Status: SHIPPED | OUTPATIENT
Start: 2025-07-07 | End: 2025-08-06

## 2025-07-07 NOTE — TELEPHONE ENCOUNTER
Patient states when going to fill her hydrocodone prescription Saturday the pharmacy states the prescription was deleted by the office.  She is requesting it be called in again. Hydromorphone 4mg bid to [facility]

## 2025-07-08 ENCOUNTER — APPOINTMENT (OUTPATIENT)
Dept: RHEUMATOLOGY | Facility: CLINIC | Age: 55
End: 2025-07-08
Payer: MEDICARE

## 2025-07-08 VITALS
HEART RATE: 69 BPM | BODY MASS INDEX: 32.78 KG/M2 | TEMPERATURE: 98 F | HEIGHT: 63 IN | OXYGEN SATURATION: 100 % | DIASTOLIC BLOOD PRESSURE: 72 MMHG | WEIGHT: 185 LBS | RESPIRATION RATE: 14 BRPM | SYSTOLIC BLOOD PRESSURE: 132 MMHG

## 2025-07-08 DIAGNOSIS — M35.9 UNDIFFERENTIATED CONNECTIVE TISSUE DISEASE (MULTI): Primary | ICD-10-CM

## 2025-07-08 PROCEDURE — 1036F TOBACCO NON-USER: CPT | Performed by: INTERNAL MEDICINE

## 2025-07-08 PROCEDURE — 3075F SYST BP GE 130 - 139MM HG: CPT | Performed by: INTERNAL MEDICINE

## 2025-07-08 PROCEDURE — 3078F DIAST BP <80 MM HG: CPT | Performed by: INTERNAL MEDICINE

## 2025-07-08 PROCEDURE — G8433 SCR FOR DEP NOT CPT DOC RSN: HCPCS | Performed by: INTERNAL MEDICINE

## 2025-07-08 PROCEDURE — 99214 OFFICE O/P EST MOD 30 MIN: CPT | Performed by: INTERNAL MEDICINE

## 2025-07-08 PROCEDURE — 3008F BODY MASS INDEX DOCD: CPT | Performed by: INTERNAL MEDICINE

## 2025-07-08 ASSESSMENT — PAIN SCALES - GENERAL: PAINLEVEL_OUTOF10: 8

## 2025-07-08 NOTE — PROGRESS NOTES
Subjective   Patient ID: Samina Celeste is a 54 y.o. female who presents for follow-up regarding undifferentiated connective tissue disease..    HPI 53 yo F here for f/u re: undifferentiated connective tissue disease (diagnosed 2004 with symptoms of severe fatigue after sun exposure, arthralgias of wrist and ankles, FAMILIA 1:80 with negative FAMILIA panel).   FAMILIA has been negative since 2017.    She tripped on a step October 9, 2024 and fractured her left fifth metatarsal.  She had surgery for open reduction and internal fixation by Dr. Cabrales November 14, 2024.  She has healed well and is back in regular shoes.     HCQ was reduced to 200 mg daily 6/21, without any worsening of symptoms.     She also takes calcium 500 mg daily and vitamin D 1000 international units daily.    She continues to deny peripheral joint pain.   She has mild dry eyes.  She has mild dry mouth- uses Biotene toothpaste.  She denies alopecia.    X-ray of left shoulder done September 2023 shows advanced osteoarthritis of the left acromioclavicular joint.  Glenohumeral joint itself looks unremarkable.  She has no exacerbation of pain with lifting her arm overhead, or flexion of her arm across her chest.    She also the history of lumbar spondylosis, for which she has been working with Dr. Dee and Dr. Xiao.  She had L4-L5 laminectomy and discectomy with pedicle screw fixation several years ago for spondylolisthesis with herniated disc. She had an L3- L4 lumbar laminectomy 1/17 for recurrent back and leg pain.   She also has spinal cord stimulator and a pain pump.   She  takes as duloxetine 60 mg 2x daily.   She takes Percocet 5-325 milligrams twice daily for pain.  She also takes Dilaudid 4 mg daily  for breakthrough pain.    She last worked 1/17. She is now on Social Security disability.     Last eye exam was 3/25 (Dr. Mascorro). Includes OCT.  10-2 VF was done 9/24.     Labs November 28, 2023: Cortrosyn stimulation test was normal with baseline  "cortisol level 4.4, 30-minute cortisol level of 20.9, 60-minute cortisol level 28.8.  Labs January 2025: TSH 0.39, CMP normal except sodium 132, CBC normal, hemoglobin A1c 5.7, cholesterol 179, HDL 65, LDL 89, triglycerides 119, FAMILIA 1:160 (speckled) with negative panel, C3 and C4 normal, CBC normal, CMP normal except sodium 132, hemoglobin A1c 5.7.  Spot urine protein to creatinine ratio 0.11     EKG December 19, 2023: Normal sinus rhythm, QTc 428 milliseconds    DEXA 5/25: T-score right femoral neck normal, T-score right total hip normal, T-score left forearm -1.6, T-score 1/3 forearm -1.7    ROS:  General: Denies fevers or chills.  CV: Denies chest pain or palpitations.  Denies leg edema.  Lungs: Denies coughing or shortness of breath.  Skin: Denies rashes or nodules.  MS: Denies joint pain or joint swelling.     Objective   Visit Vitals  /72 (BP Location: Left arm, Patient Position: Sitting, BP Cuff Size: Adult)   Pulse 69   Temp 36.7 °C (98 °F)   Resp 14   Ht 1.6 m (5' 3\")   Wt 83.9 kg (185 lb)   SpO2 100%   BMI 32.77 kg/m²   OB Status Postmenopausal   Smoking Status Former   BSA 1.93 m²         Physical Exam  General Appearance: Well-nourished and well-appearing.  HEENT: PERRL, EOMI  Neck: Supple, no nodes.  CV: RRR, no MGR.  Lungs: Clear, no rales or wheezes.  Abdomen: Soft, nontender. No hepatosplenomegaly.  Extremities:  No cyanosis, clubbing, or edema.  MS: No synovitis.    Skin: No rashes or nodules.    Assessment/Plan   Problem List Items Addressed This Visit           ICD-10-CM    Undifferentiated connective tissue disease (Multi) - Primary M35.9    Relevant Orders    CBC and Auto Differential    Basic Metabolic Panel    Sedimentation Rate    Protein, Urine Random    Creatinine, Urine Random    BMI 32.0-32.9,adult Z68.32    RESOLVED: BMI 27.0-27.9,adult Z68.27       1. Undifferentiated connective tissue disease- Stable on  mg daily.     2. Lumbar spondylosis- continue follow-up with Dr." Dameon. She is now on Social Security disability.  She has pain pump and spinal cord stimulator. She takes  hydromorphone 4 mg daily as needed for breakthrough pain, takes Percocet 5-325 mg twice daily.     3. BMI 32- improved. will continue to work on weight loss.    4.  Hyponatremia-sodium 132.  Cortrosyn stimulation test 12/23 was normal.    5.  Left fifth metatarsal fracture 10/24- s/p ORIF 11/14/24.       Bone density 5/25 shows normal hip BMD, T-score -1.6 left forearm.      For now she is trying to get calcium 1200 mg total daily (diet plus supplements) and vitamin D 1000 international units daily.    Plan:  Check CBC with diff, BMP, ESR, spot urine protein to creatinine ratio.  Follow-up in 6 months.

## 2025-07-09 LAB
ANION GAP SERPL CALCULATED.4IONS-SCNC: 11 MMOL/L (CALC) (ref 7–17)
BASOPHILS # BLD AUTO: 71 CELLS/UL (ref 0–200)
BASOPHILS NFR BLD AUTO: 0.7 %
BUN SERPL-MCNC: 13 MG/DL (ref 7–25)
BUN/CREAT SERPL: ABNORMAL (CALC) (ref 6–22)
CALCIUM SERPL-MCNC: 9.9 MG/DL (ref 8.6–10.4)
CHLORIDE SERPL-SCNC: 93 MMOL/L (ref 98–110)
CO2 SERPL-SCNC: 26 MMOL/L (ref 20–32)
CREAT SERPL-MCNC: 0.88 MG/DL (ref 0.5–1.03)
CREAT UR-MCNC: 27 MG/DL (ref 20–275)
EGFRCR SERPLBLD CKD-EPI 2021: 77 ML/MIN/1.73M2
EOSINOPHIL # BLD AUTO: 172 CELLS/UL (ref 15–500)
EOSINOPHIL NFR BLD AUTO: 1.7 %
ERYTHROCYTE [DISTWIDTH] IN BLOOD BY AUTOMATED COUNT: 12.6 % (ref 11–15)
ERYTHROCYTE [SEDIMENTATION RATE] IN BLOOD BY WESTERGREN METHOD: 14 MM/H
GLUCOSE SERPL-MCNC: 75 MG/DL (ref 65–99)
HCT VFR BLD AUTO: 33.3 % (ref 35–45)
HGB BLD-MCNC: 10.8 G/DL (ref 11.7–15.5)
LYMPHOCYTES # BLD AUTO: 4505 CELLS/UL (ref 850–3900)
LYMPHOCYTES NFR BLD AUTO: 44.6 %
MCH RBC QN AUTO: 30.6 PG (ref 27–33)
MCHC RBC AUTO-ENTMCNC: 32.4 G/DL (ref 32–36)
MCV RBC AUTO: 94.3 FL (ref 80–100)
MONOCYTES # BLD AUTO: 1030 CELLS/UL (ref 200–950)
MONOCYTES NFR BLD AUTO: 10.2 %
NEUTROPHILS # BLD AUTO: 4323 CELLS/UL (ref 1500–7800)
NEUTROPHILS NFR BLD AUTO: 42.8 %
PLATELET # BLD AUTO: 360 THOUSAND/UL (ref 140–400)
PMV BLD REES-ECKER: 11.1 FL (ref 7.5–12.5)
POTASSIUM SERPL-SCNC: 4.5 MMOL/L (ref 3.5–5.3)
PROT UR-MCNC: <4 MG/DL (ref 5–24)
PROT/CREAT UR: ABNORMAL MG/G CREAT (ref 24–184)
PROT/CREAT UR: ABNORMAL MG/MG CREAT (ref 0.02–0.18)
RBC # BLD AUTO: 3.53 MILLION/UL (ref 3.8–5.1)
SODIUM SERPL-SCNC: 130 MMOL/L (ref 135–146)
WBC # BLD AUTO: 10.1 THOUSAND/UL (ref 3.8–10.8)

## 2025-07-20 DIAGNOSIS — D64.9 ANEMIA, UNSPECIFIED TYPE: Primary | ICD-10-CM

## 2025-07-21 ENCOUNTER — TELEPHONE (OUTPATIENT)
Dept: PRIMARY CARE | Facility: CLINIC | Age: 55
End: 2025-07-21
Payer: MEDICARE

## 2025-07-21 NOTE — TELEPHONE ENCOUNTER
----- Message from Rebeca Arreola sent at 7/20/2025  8:40 PM EDT -----  Please make sure she saw a message regarding her labs.  Dr. Alfonso

## 2025-07-23 DIAGNOSIS — G47.00 INSOMNIA, UNSPECIFIED TYPE: ICD-10-CM

## 2025-07-23 RX ORDER — TRAZODONE HYDROCHLORIDE 100 MG/1
100 TABLET ORAL NIGHTLY PRN
Qty: 90 TABLET | Refills: 1 | Status: SHIPPED | OUTPATIENT
Start: 2025-07-23 | End: 2026-07-23

## 2025-08-04 ENCOUNTER — TELEPHONE (OUTPATIENT)
Dept: PAIN MEDICINE | Facility: CLINIC | Age: 55
End: 2025-08-04
Payer: MEDICARE

## 2025-08-04 DIAGNOSIS — G89.29 CHRONIC MIDLINE LOW BACK PAIN WITH LEFT-SIDED SCIATICA: ICD-10-CM

## 2025-08-04 DIAGNOSIS — M54.42 CHRONIC MIDLINE LOW BACK PAIN WITH LEFT-SIDED SCIATICA: ICD-10-CM

## 2025-08-04 RX ORDER — HYDROMORPHONE HYDROCHLORIDE 4 MG/1
4 TABLET ORAL 2 TIMES DAILY PRN
Qty: 60 TABLET | Refills: 0 | Status: SHIPPED | OUTPATIENT
Start: 2025-08-04 | End: 2025-09-03

## 2025-08-04 NOTE — TELEPHONE ENCOUNTER
Patient called for refill of hydromorphone. She is due for it today. It appears another provider cancelled it. She uses [facility] in [place]

## 2025-08-27 ENCOUNTER — OFFICE VISIT (OUTPATIENT)
Dept: PAIN MEDICINE | Facility: CLINIC | Age: 55
End: 2025-08-27
Payer: MEDICARE

## 2025-08-27 VITALS
SYSTOLIC BLOOD PRESSURE: 105 MMHG | TEMPERATURE: 97.3 F | RESPIRATION RATE: 18 BRPM | OXYGEN SATURATION: 96 % | HEART RATE: 65 BPM | DIASTOLIC BLOOD PRESSURE: 70 MMHG

## 2025-08-27 DIAGNOSIS — M96.1 LUMBAR POST-LAMINECTOMY SYNDROME: Primary | ICD-10-CM

## 2025-08-27 PROCEDURE — 3078F DIAST BP <80 MM HG: CPT

## 2025-08-27 PROCEDURE — 99213 OFFICE O/P EST LOW 20 MIN: CPT

## 2025-08-27 PROCEDURE — 3074F SYST BP LT 130 MM HG: CPT

## 2025-08-27 RX ORDER — OXYCODONE AND ACETAMINOPHEN 5; 325 MG/1; MG/1
1 TABLET ORAL 2 TIMES DAILY PRN
Qty: 60 TABLET | Refills: 0 | Status: SHIPPED | OUTPATIENT
Start: 2025-09-05 | End: 2025-10-05

## 2025-08-27 ASSESSMENT — PAIN SCALES - GENERAL: PAINLEVEL_OUTOF10: 6

## 2025-09-04 ENCOUNTER — APPOINTMENT (OUTPATIENT)
Dept: PRIMARY CARE | Facility: CLINIC | Age: 55
End: 2025-09-04
Payer: MEDICARE

## 2025-09-05 ENCOUNTER — APPOINTMENT (OUTPATIENT)
Dept: PRIMARY CARE | Facility: CLINIC | Age: 55
End: 2025-09-05
Payer: MEDICARE

## 2025-09-11 ENCOUNTER — APPOINTMENT (OUTPATIENT)
Dept: PRIMARY CARE | Facility: CLINIC | Age: 55
End: 2025-09-11
Payer: MEDICARE

## 2025-09-16 ENCOUNTER — APPOINTMENT (OUTPATIENT)
Dept: PRIMARY CARE | Facility: CLINIC | Age: 55
End: 2025-09-16
Payer: MEDICARE

## 2026-01-08 ENCOUNTER — APPOINTMENT (OUTPATIENT)
Dept: RHEUMATOLOGY | Facility: CLINIC | Age: 56
End: 2026-01-08
Payer: MEDICARE

## (undated) DEVICE — BANDAGE, ELASTIC, REINFORCED, ECONO-WRAP, 4 IN X 4.5 YD, LATEX

## (undated) DEVICE — APPLICATOR, CHLORAPREP, W/ORANGE TINT, 26ML

## (undated) DEVICE — DRAPE, SHEET, FAN FOLDED, MEDIUM, 44 X 72 IN, DISPOSABLE, LF, STERILE

## (undated) DEVICE — BANDAGE, ELASTIC, REINFORCED, ECONO-WRAP, 6 IN X 4.5 YD, LATEX

## (undated) DEVICE — PADDING, WEBRIL, UNDERCAST, STERILE, 6 IN

## (undated) DEVICE — PREP TRAY, VAGINAL

## (undated) DEVICE — SUTURE, ETHILON, 4-0, 18, PS2, BLACK

## (undated) DEVICE — GLOVE, SURGICAL, SENSITIVE, GAMMEX, SZ-7, PF, WHITE

## (undated) DEVICE — Device

## (undated) DEVICE — DRESSING, ABDOMINAL, TENDERSORB, 8 X 10 IN, STERILE

## (undated) DEVICE — SUTURE, CTD, VICRYL, 2-0, UND, BR, CT-2

## (undated) DEVICE — BLADE, OPHTHALMIC, BEAVER, MINI, SHARP ONE SIDE

## (undated) DEVICE — TOWEL, SURGICAL, NEURO, O/R, 16 X 26, BLUE, STERILE

## (undated) DEVICE — CORD, CAUTERY, BIOPOLAR FORCEP, 12FT

## (undated) DEVICE — SYRINGE, 60 CC, IRRIGATION, BULB, CONTRO-BULB, PAPER POUCH

## (undated) DEVICE — COVER HANDLE LIGHT, STERIS, BLUE, STERILE

## (undated) DEVICE — CUFF, TOURNIQUET, 30 X 4, DUAL PORT/SNGL BLADDER, DISP, LF

## (undated) DEVICE — PADDING, WEBRIL, UNDERCAST, STERILE, 4 IN

## (undated) DEVICE — GLOVE, SURGICAL, PROTEXIS PI ORTHO, 7.0, PF, LF

## (undated) DEVICE — SUTURE, VICRYL, 4-0, 18 IN, UNDYED BR PS-2

## (undated) DEVICE — DRAPE, TOWEL, STERI DRAPE, 17 X 11 IN, PLASTIC, STERILE